# Patient Record
Sex: MALE | Race: ASIAN | Employment: OTHER | URBAN - METROPOLITAN AREA
[De-identification: names, ages, dates, MRNs, and addresses within clinical notes are randomized per-mention and may not be internally consistent; named-entity substitution may affect disease eponyms.]

---

## 2017-02-03 ENCOUNTER — HOSPITAL ENCOUNTER (EMERGENCY)
Age: 77
Discharge: HOME | End: 2017-02-03
Attending: EMERGENCY MEDICINE
Payer: MEDICARE

## 2017-02-03 VITALS
TEMPERATURE: 98 F | DIASTOLIC BLOOD PRESSURE: 70 MMHG | RESPIRATION RATE: 18 BRPM | OXYGEN SATURATION: 100 % | HEART RATE: 67 BPM | BODY MASS INDEX: 21 KG/M2 | WEIGHT: 141 LBS | SYSTOLIC BLOOD PRESSURE: 116 MMHG

## 2017-02-03 DIAGNOSIS — I86.1 VARICOSE VEIN OF SCROTUM: Primary | ICD-10-CM

## 2017-02-03 DIAGNOSIS — R58 BLEEDING: ICD-10-CM

## 2017-02-03 PROCEDURE — 12001 RPR S/N/AX/GEN/TRNK 2.5CM/<: CPT

## 2017-02-03 PROCEDURE — 99283 EMERGENCY DEPT VISIT LOW MDM: CPT

## 2017-02-03 PROCEDURE — 99282 EMERGENCY DEPT VISIT SF MDM: CPT

## 2017-02-03 RX ORDER — AMANTADINE HYDROCHLORIDE 100 MG/1
100 CAPSULE, GELATIN COATED ORAL 2 TIMES DAILY
COMMUNITY

## 2017-02-03 NOTE — ED PROVIDER NOTES
Patient Seen in: THE [facility] Emergency Department In [place]    History   Patient presents with:  Bleeding (hematologic)  Eval-G (gynecologic)    Stated Complaint: bleeding from scrotum since last noc.     HPI    The patient is a 70-year-old male with a his mouth nightly as needed. bisacodyl 10 MG Rectal Suppos,  Place 10 mg rectally daily. Pravastatin Sodium (PRAVACHOL) 20 MG Oral Tab,  Take 20 mg by mouth nightly. finasteride (PROSCAR) 5 MG Oral Tab,  Take 5 mg by mouth nightly.          Family Histo the scrotum has some dried blood. On removal of the dressing, the source of the bleeding was not clear until I was able to clean the skin.   Upon cleaning of the skin, it appears that he has a small scab on the left side of his scrotum, and it was inadvert [facility] [address]  [phone]    Go in 7 days  For suture removal    Pro Tyson MD  [facility] [address] [facility] [address] [facility] [phone]    Call in 1 day  for follow up with urology within 1-2 weeks.       Medications Prescribed:

## 2017-02-11 ENCOUNTER — HOSPITAL ENCOUNTER (EMERGENCY)
Age: 77
Discharge: HOME | End: 2017-02-11
Attending: EMERGENCY MEDICINE
Payer: MEDICARE

## 2017-02-11 VITALS
DIASTOLIC BLOOD PRESSURE: 65 MMHG | WEIGHT: 141 LBS | BODY MASS INDEX: 21 KG/M2 | RESPIRATION RATE: 20 BRPM | SYSTOLIC BLOOD PRESSURE: 130 MMHG | HEART RATE: 68 BPM | OXYGEN SATURATION: 95 % | TEMPERATURE: 97 F

## 2017-02-11 DIAGNOSIS — Z48.02 ENCOUNTER FOR REMOVAL OF SUTURES: Primary | ICD-10-CM

## 2017-02-11 NOTE — ED PROVIDER NOTES
Patient Seen in: THE [facility] Emergency Department In [place]    History   Patient presents with:  Sut Stap Maria Fernanda (ingtegumentary)    Stated Complaint: suture removal     HPI  Patient is a 66-year-old male who 8 days ago began bleeding from a varicose • Other[other] [OTHER] Father    • Courtney Rae Mother          Smoking Status: Former Smoker                   Packs/Day: 0.25  Years: 15        Quit date: 10/18/2011    Alcohol Use: Yes           0.6 oz/week       1 Glasses of wine per week

## 2017-02-26 ENCOUNTER — HOSPITAL ENCOUNTER (OUTPATIENT)
Dept: ULTRASOUND IMAGING | Age: 77
Discharge: HOME | End: 2017-02-26
Attending: FAMILY MEDICINE
Payer: MEDICARE

## 2017-02-26 DIAGNOSIS — R26.0 ATAXIC GAIT: ICD-10-CM

## 2017-02-26 DIAGNOSIS — R42 DIZZINESS AND GIDDINESS: ICD-10-CM

## 2017-02-26 DIAGNOSIS — R26.81 UNSTEADINESS ON FEET: ICD-10-CM

## 2017-02-26 PROCEDURE — 93880 EXTRACRANIAL BILAT STUDY: CPT

## 2017-10-10 ENCOUNTER — HOSPITAL ENCOUNTER (EMERGENCY)
Facility: HOSPITAL | Age: 77
Discharge: HOME | End: 2017-10-10
Attending: EMERGENCY MEDICINE
Payer: MEDICARE

## 2017-10-10 ENCOUNTER — APPOINTMENT (OUTPATIENT)
Dept: MRI IMAGING | Facility: HOSPITAL | Age: 77
End: 2017-10-10
Attending: EMERGENCY MEDICINE
Payer: MEDICARE

## 2017-10-10 VITALS
WEIGHT: 141 LBS | TEMPERATURE: 98 F | HEIGHT: 69 IN | BODY MASS INDEX: 20.88 KG/M2 | SYSTOLIC BLOOD PRESSURE: 168 MMHG | HEART RATE: 62 BPM | OXYGEN SATURATION: 100 % | DIASTOLIC BLOOD PRESSURE: 80 MMHG | RESPIRATION RATE: 18 BRPM

## 2017-10-10 DIAGNOSIS — I10 ESSENTIAL HYPERTENSION: Primary | ICD-10-CM

## 2017-10-10 DIAGNOSIS — R42 DIZZINESS: ICD-10-CM

## 2017-10-10 PROCEDURE — 85025 COMPLETE CBC W/AUTO DIFF WBC: CPT | Performed by: EMERGENCY MEDICINE

## 2017-10-10 PROCEDURE — 80053 COMPREHEN METABOLIC PANEL: CPT | Performed by: EMERGENCY MEDICINE

## 2017-10-10 PROCEDURE — 93010 ELECTROCARDIOGRAM REPORT: CPT

## 2017-10-10 PROCEDURE — 99285 EMERGENCY DEPT VISIT HI MDM: CPT

## 2017-10-10 PROCEDURE — 70551 MRI BRAIN STEM W/O DYE: CPT | Performed by: EMERGENCY MEDICINE

## 2017-10-10 PROCEDURE — 36415 COLL VENOUS BLD VENIPUNCTURE: CPT

## 2017-10-10 PROCEDURE — 93005 ELECTROCARDIOGRAM TRACING: CPT

## 2017-10-10 PROCEDURE — 84484 ASSAY OF TROPONIN QUANT: CPT | Performed by: EMERGENCY MEDICINE

## 2017-10-10 RX ORDER — AMLODIPINE BESYLATE 2.5 MG/1
2.5 TABLET ORAL DAILY
COMMUNITY
End: 2019-09-12

## 2017-10-11 NOTE — ED PROVIDER NOTES
Patient Seen in: [facility] Emergency Department    History   Patient presents with:  Hypertension (cardiovascular)    Stated Complaint: hypertension/ dizzy    HPI    Patient is a 80-year-old with a history of DVT, Parkinson's disease, BPH, hyperten Packs/day: 0.25      Years: 12.00        Quit date: 10/18/2011  Alcohol use: Yes           0.6 oz/week     Glasses of wine: 1 per week     Comment: 1-2 a month      Review of Systems    Positive for stated complaint: hypertension/ dizzy Normal   CBC WITH DIFFERENTIAL WITH PLATELET    Narrative: The following orders were created for panel order CBC WITH DIFFERENTIAL WITH PLATELET.   Procedure                               Abnormality         Status                     --------- days        Medications Prescribed:  Current Discharge Medication List

## 2017-10-30 ENCOUNTER — HOSPITAL ENCOUNTER (OUTPATIENT)
Dept: CV DIAGNOSTICS | Age: 77
Discharge: HOME | End: 2017-10-30
Attending: FAMILY MEDICINE
Payer: MEDICARE

## 2017-10-30 DIAGNOSIS — R26.81 UNSTEADINESS ON FEET: ICD-10-CM

## 2017-10-30 DIAGNOSIS — I95.9 HYPOTENSION, UNSPECIFIED HYPOTENSION TYPE: ICD-10-CM

## 2017-10-30 DIAGNOSIS — R42 DIZZINESS: ICD-10-CM

## 2017-10-30 PROCEDURE — 93306 TTE W/DOPPLER COMPLETE: CPT | Performed by: FAMILY MEDICINE

## 2018-04-23 ENCOUNTER — HOSPITAL ENCOUNTER (EMERGENCY)
Age: 78
Discharge: HOME | End: 2018-04-23
Attending: EMERGENCY MEDICINE
Payer: MEDICARE

## 2018-04-23 ENCOUNTER — APPOINTMENT (OUTPATIENT)
Dept: GENERAL RADIOLOGY | Age: 78
End: 2018-04-23
Attending: NURSE PRACTITIONER
Payer: MEDICARE

## 2018-04-23 VITALS
BODY MASS INDEX: 22 KG/M2 | OXYGEN SATURATION: 98 % | WEIGHT: 143 LBS | SYSTOLIC BLOOD PRESSURE: 112 MMHG | RESPIRATION RATE: 18 BRPM | HEART RATE: 76 BPM | TEMPERATURE: 98 F | DIASTOLIC BLOOD PRESSURE: 82 MMHG

## 2018-04-23 DIAGNOSIS — B34.9 VIRAL SYNDROME: Primary | ICD-10-CM

## 2018-04-23 PROCEDURE — 99283 EMERGENCY DEPT VISIT LOW MDM: CPT

## 2018-04-23 PROCEDURE — 87430 STREP A AG IA: CPT | Performed by: NURSE PRACTITIONER

## 2018-04-23 PROCEDURE — 87081 CULTURE SCREEN ONLY: CPT | Performed by: NURSE PRACTITIONER

## 2018-04-23 PROCEDURE — 71046 X-RAY EXAM CHEST 2 VIEWS: CPT | Performed by: NURSE PRACTITIONER

## 2018-04-23 RX ORDER — AZITHROMYCIN 500 MG/1
500 TABLET, FILM COATED ORAL DAILY
COMMUNITY
End: 2018-11-14

## 2018-04-23 NOTE — ED PROVIDER NOTES
I reviewed that chart and discussed the case with the physician assistant. I have examined the patient and noted review the x-ray. Does appear to have a viral syndrome. .    I agree with the physician assistant assessment and diagnosis  I agree with the p

## 2018-04-23 NOTE — ED PROVIDER NOTES
Patient Seen in: [facility] In [place]    History   Patient presents with:  Cough/URI  Dyspnea TESSIE SOB (respiratory)    Stated Complaint: cough and chest congestion last three days    72-year-old male presents today with complaints of Alcohol use: Yes           0.6 oz/week     Glasses of wine: 1 per week     Comment: 1-2 a month      Review of Systems    Positive for stated complaint: cough and chest congestion last three days  Other systems are as noted in HPI.   Const effusions. No pneumothorax. Linear atelectasis in the right lung base in left mid lung zone. Atheromatous calcifications of the aorta. Hyperexpansion lungs. Degenerative disc  disease of the thoracic spine.       CONCLUSION:  Hyperexpansion of the lungs

## 2018-10-05 ENCOUNTER — HOSPITAL ENCOUNTER (OUTPATIENT)
Dept: ULTRASOUND IMAGING | Age: 78
Discharge: HOME | End: 2018-10-05
Attending: FAMILY MEDICINE
Payer: MEDICARE

## 2018-10-05 DIAGNOSIS — Z86.718 HX OF DEEP VENOUS THROMBOSIS: ICD-10-CM

## 2018-10-05 DIAGNOSIS — I73.9 PVD (PERIPHERAL VASCULAR DISEASE) (HCC): ICD-10-CM

## 2018-10-05 DIAGNOSIS — M79.89 SWELLING OF CALF: ICD-10-CM

## 2018-10-05 PROCEDURE — 93971 EXTREMITY STUDY: CPT | Performed by: FAMILY MEDICINE

## 2018-10-05 PROCEDURE — 93880 EXTRACRANIAL BILAT STUDY: CPT | Performed by: FAMILY MEDICINE

## 2018-10-31 ENCOUNTER — HOSPITAL ENCOUNTER (OUTPATIENT)
Dept: MRI IMAGING | Facility: HOSPITAL | Age: 78
Discharge: HOME | End: 2018-10-31
Attending: FAMILY MEDICINE
Payer: MEDICARE

## 2018-10-31 DIAGNOSIS — R97.20 ELEVATED PSA: ICD-10-CM

## 2018-10-31 PROCEDURE — 72197 MRI PELVIS W/O & W/DYE: CPT | Performed by: FAMILY MEDICINE

## 2018-10-31 PROCEDURE — 82565 ASSAY OF CREATININE: CPT

## 2018-10-31 PROCEDURE — A9575 INJ GADOTERATE MEGLUMI 0.1ML: HCPCS | Performed by: FAMILY MEDICINE

## 2018-11-14 ENCOUNTER — HOSPITAL ENCOUNTER (EMERGENCY)
Facility: HOSPITAL | Age: 78
Discharge: HOME | End: 2018-11-14
Attending: EMERGENCY MEDICINE
Payer: MEDICARE

## 2018-11-14 VITALS
WEIGHT: 148 LBS | SYSTOLIC BLOOD PRESSURE: 121 MMHG | RESPIRATION RATE: 20 BRPM | HEIGHT: 69 IN | DIASTOLIC BLOOD PRESSURE: 59 MMHG | BODY MASS INDEX: 21.92 KG/M2 | TEMPERATURE: 98 F | OXYGEN SATURATION: 98 % | HEART RATE: 72 BPM

## 2018-11-14 DIAGNOSIS — N30.00 ACUTE CYSTITIS WITHOUT HEMATURIA: Primary | ICD-10-CM

## 2018-11-14 PROCEDURE — 81001 URINALYSIS AUTO W/SCOPE: CPT | Performed by: EMERGENCY MEDICINE

## 2018-11-14 PROCEDURE — 87186 SC STD MICRODIL/AGAR DIL: CPT | Performed by: EMERGENCY MEDICINE

## 2018-11-14 PROCEDURE — 99284 EMERGENCY DEPT VISIT MOD MDM: CPT | Performed by: EMERGENCY MEDICINE

## 2018-11-14 PROCEDURE — 83605 ASSAY OF LACTIC ACID: CPT | Performed by: EMERGENCY MEDICINE

## 2018-11-14 PROCEDURE — 80053 COMPREHEN METABOLIC PANEL: CPT | Performed by: EMERGENCY MEDICINE

## 2018-11-14 PROCEDURE — 87086 URINE CULTURE/COLONY COUNT: CPT | Performed by: EMERGENCY MEDICINE

## 2018-11-14 PROCEDURE — 96365 THER/PROPH/DIAG IV INF INIT: CPT | Performed by: EMERGENCY MEDICINE

## 2018-11-14 PROCEDURE — 85025 COMPLETE CBC W/AUTO DIFF WBC: CPT | Performed by: EMERGENCY MEDICINE

## 2018-11-14 PROCEDURE — 87077 CULTURE AEROBIC IDENTIFY: CPT | Performed by: EMERGENCY MEDICINE

## 2018-11-14 RX ORDER — TAMSULOSIN HYDROCHLORIDE 0.4 MG/1
0.4 CAPSULE ORAL DAILY
COMMUNITY
End: 2019-09-12

## 2018-11-14 RX ORDER — LEVOFLOXACIN 5 MG/ML
750 INJECTION, SOLUTION INTRAVENOUS ONCE
Status: COMPLETED | OUTPATIENT
Start: 2018-11-14 | End: 2018-11-14

## 2018-11-14 RX ORDER — SULFAMETHOXAZOLE AND TRIMETHOPRIM 800; 160 MG/1; MG/1
1 TABLET ORAL 2 TIMES DAILY
Qty: 14 TABLET | Refills: 0 | Status: ON HOLD | OUTPATIENT
Start: 2018-11-14 | End: 2018-11-21

## 2018-11-15 NOTE — ED INITIAL ASSESSMENT (HPI)
Here for generalized weakness, urinary frequency with fever today.  Recently have lockwood cath x 2 weeks taken out yesterday, FC was placed due to prostate level studies

## 2018-11-15 NOTE — CONSULTS
[facility] Pharmacy Note:  Renal Adjustment for levofloxacin (LEVAQUIN)    Grayson Conception is a 66year old male who has been prescribed levofloxacin (LEVAQUIN) 500 mg x1 dose. CrCl is estimated creatinine clearance is 64.2 mL/min (based on SCr of 0.9 mg/dL).  so

## 2018-11-16 ENCOUNTER — HOSPITAL ENCOUNTER (INPATIENT)
Facility: HOSPITAL | Age: 78
LOS: 4 days | Discharge: HOME HEALTH | DRG: 698 | End: 2018-11-21
Attending: EMERGENCY MEDICINE | Admitting: HOSPITALIST
Payer: MEDICARE

## 2018-11-16 DIAGNOSIS — N39.0 URINARY TRACT INFECTION WITHOUT HEMATURIA, SITE UNSPECIFIED: Primary | ICD-10-CM

## 2018-11-17 ENCOUNTER — APPOINTMENT (OUTPATIENT)
Dept: ULTRASOUND IMAGING | Facility: HOSPITAL | Age: 78
DRG: 698 | End: 2018-11-17
Attending: INTERNAL MEDICINE
Payer: MEDICARE

## 2018-11-17 PROBLEM — N39.0 URINARY TRACT INFECTION WITHOUT HEMATURIA: Status: ACTIVE | Noted: 2018-11-17

## 2018-11-17 PROBLEM — N39.0 URINARY TRACT INFECTION WITHOUT HEMATURIA, SITE UNSPECIFIED: Status: ACTIVE | Noted: 2018-11-17

## 2018-11-17 PROCEDURE — 93975 VASCULAR STUDY: CPT | Performed by: INTERNAL MEDICINE

## 2018-11-17 PROCEDURE — 99222 1ST HOSP IP/OBS MODERATE 55: CPT | Performed by: HOSPITALIST

## 2018-11-17 PROCEDURE — 76870 US EXAM SCROTUM: CPT | Performed by: INTERNAL MEDICINE

## 2018-11-17 RX ORDER — ACETAMINOPHEN 325 MG/1
650 TABLET ORAL EVERY 4 HOURS PRN
Status: DISCONTINUED | OUTPATIENT
Start: 2018-11-17 | End: 2018-11-21

## 2018-11-17 RX ORDER — ALPRAZOLAM 0.25 MG/1
0.25 TABLET ORAL NIGHTLY PRN
Status: DISCONTINUED | OUTPATIENT
Start: 2018-11-17 | End: 2018-11-21

## 2018-11-17 RX ORDER — AMANTADINE HYDROCHLORIDE 100 MG/1
100 TABLET ORAL 2 TIMES DAILY
Status: DISCONTINUED | OUTPATIENT
Start: 2018-11-17 | End: 2018-11-21

## 2018-11-17 RX ORDER — IBUPROFEN 400 MG/1
400 TABLET ORAL EVERY 6 HOURS PRN
Status: DISCONTINUED | OUTPATIENT
Start: 2018-11-17 | End: 2018-11-21

## 2018-11-17 RX ORDER — FINASTERIDE 5 MG/1
5 TABLET, FILM COATED ORAL NIGHTLY
Status: DISCONTINUED | OUTPATIENT
Start: 2018-11-17 | End: 2018-11-21

## 2018-11-17 RX ORDER — LEVOFLOXACIN 5 MG/ML
750 INJECTION, SOLUTION INTRAVENOUS ONCE
Status: DISCONTINUED | OUTPATIENT
Start: 2018-11-17 | End: 2018-11-17

## 2018-11-17 RX ORDER — SODIUM CHLORIDE 9 MG/ML
INJECTION, SOLUTION INTRAVENOUS CONTINUOUS
Status: ACTIVE | OUTPATIENT
Start: 2018-11-17 | End: 2018-11-17

## 2018-11-17 RX ORDER — ACETAMINOPHEN 325 MG/1
650 TABLET ORAL EVERY 6 HOURS PRN
Status: DISCONTINUED | OUTPATIENT
Start: 2018-11-17 | End: 2018-11-17

## 2018-11-17 RX ORDER — ONDANSETRON 2 MG/ML
4 INJECTION INTRAMUSCULAR; INTRAVENOUS EVERY 6 HOURS PRN
Status: DISCONTINUED | OUTPATIENT
Start: 2018-11-17 | End: 2018-11-21

## 2018-11-17 RX ORDER — ALFUZOSIN HYDROCHLORIDE 10 MG/1
10 TABLET, EXTENDED RELEASE ORAL
Status: DISCONTINUED | OUTPATIENT
Start: 2018-11-17 | End: 2018-11-21

## 2018-11-17 RX ORDER — RASAGILINE 0.5 MG/1
1 TABLET ORAL DAILY
Status: DISCONTINUED | OUTPATIENT
Start: 2018-11-17 | End: 2018-11-21

## 2018-11-17 RX ORDER — ALPRAZOLAM 0.25 MG/1
0.25 TABLET ORAL DAILY
Status: DISCONTINUED | OUTPATIENT
Start: 2018-11-17 | End: 2018-11-17

## 2018-11-17 RX ORDER — METOCLOPRAMIDE HYDROCHLORIDE 5 MG/ML
10 INJECTION INTRAMUSCULAR; INTRAVENOUS EVERY 8 HOURS PRN
Status: DISCONTINUED | OUTPATIENT
Start: 2018-11-17 | End: 2018-11-21

## 2018-11-17 RX ORDER — MELATONIN
3 NIGHTLY PRN
Status: DISCONTINUED | OUTPATIENT
Start: 2018-11-17 | End: 2018-11-21

## 2018-11-17 RX ORDER — ASPIRIN 81 MG/1
81 TABLET, CHEWABLE ORAL
Status: DISCONTINUED | OUTPATIENT
Start: 2018-11-17 | End: 2018-11-21

## 2018-11-17 RX ORDER — AZELASTINE 1 MG/ML
1-2 SPRAY, METERED NASAL 2 TIMES DAILY
Status: DISCONTINUED | OUTPATIENT
Start: 2018-11-17 | End: 2018-11-21

## 2018-11-17 RX ORDER — ENOXAPARIN SODIUM 100 MG/ML
40 INJECTION SUBCUTANEOUS DAILY
Status: DISCONTINUED | OUTPATIENT
Start: 2018-11-17 | End: 2018-11-21

## 2018-11-17 NOTE — PLAN OF CARE
Patient is alert and oriented x 4. Vitals stable . C/o some aching pain on the testes. Notified Abhilash Barnett about the pain at bedside. Has some low grade fever. Plan of care updated to the pt. Fall precautions observed. Will continue to monitor .     1500: P

## 2018-11-17 NOTE — ED NOTES
Report given to [place], RN x [phone] at [phone]. Transport paged. Pt and family updated regarding plan of care.

## 2018-11-17 NOTE — PLAN OF CARE
NURSING ADMISSION NOTE      Patient admitted via Cart  Oriented to room. Safety precautions initiated. Bed in low position. Call light in reach. Patient A+Ox4. Wife at bedside. Able to ambulate to bed with assistance.  Admission database completed

## 2018-11-17 NOTE — ED PROVIDER NOTES
Patient Seen in: [facility] Emergency Department    History   Patient presents with:  Urinary Symptoms (urologic)    Stated Complaint: uti symptoms    HPI    Patient is a 51-year-old male recently diagnosed with UTI 3 days ago, who was given a dose Yes      Alcohol/week: 0.6 oz      Types: 1 Glasses of wine per week      Comment: 1-2 a month    Drug use: No      Review of Systems    Positive for stated complaint: uti symptoms  Other systems are as noted in HPI.   Constitutional and vital signs reviewe throughout, and normal active range of motion of all 4 extremities. Distal pulses normal and symmetric  Skin: No lacerations or abrasions. No masses or nodules or abnormalities.   Psych: Normal interaction, cooperative with exam       ED Course     Labs R infected. Culture is pending. He is given with IV levofloxacin and IV normal saline. He will need to be admitted for IV antibiotics for urinary tract infection, with failure of outpatient management.       [facility]     Admission disposition: 11/17/2018  1:23

## 2018-11-17 NOTE — ED INITIAL ASSESSMENT (HPI)
Pt to ED c/o frequency in urination with fever & dysuria. Pt is currently on antibiotics for UTI after seen here in the ED on 11/14/18. Pt's wife also states that Pt fell tonight at 200.  Pt was using the washroom, was trying to get up but legs felt weak,

## 2018-11-17 NOTE — PROGRESS NOTES
Hospitalist Follow-up Note    Patient seen and examined and agree with plan outlined by Dr. Ele Carmona earlier this AM. Continue empiric antibiotics for ESBL UTI. Repeat urine culture pending. ID eval pending. Continue IVF.     Veronika Warren,   11:54 AM

## 2018-11-17 NOTE — CONSULTS
[address] [place] [address]  TEL: [phone]  FAX: [phone]    Jim Posey Patient Status:  Inpatient    1940 MRN [id number]   [facility] 4NW-A Attending Wilner Millan, [address] [facility] Day # 0 CARMELA Cloud MD 10/24/2016    Performed by Bernardino Hobbs MD at [address] [place] [address] MAIN OR     Family History   Problem Relation Age of Onset   • Heart Attack Father    • Other (Other) Father    • Other (Other) Mother    • Diabetes Sister    • Hypertension Sister    • Diabetes Brother systems was completed. Pertinent positives and negatives noted in the the HPI. Physical Exam:    General: No acute distress. Alert and oriented x 3.   Vital signs: Blood pressure 136/58, pulse 101, temperature 99 °F (37.2 °C), temperature source Oral, r with scrotal wall cellulitis and edema right side due to above. Has increased pain discomfort and swelling  4. Electrolyte imbalance associated with above  5. History of Parkinson's with continued worsening weakness due to sepsis. Oneal Claude     PLAN:    -Continue

## 2018-11-18 PROCEDURE — 99232 SBSQ HOSP IP/OBS MODERATE 35: CPT | Performed by: HOSPITALIST

## 2018-11-18 NOTE — PROGRESS NOTES
IVA HOSPITALIST  Progress Note     Rufus Aly Patient Status:  Inpatient    1940 MRN [id number]   [facility] 4NW-A Attending Thom Callahan MD   Hosp Day # 1 PCP Faiza Matos MD     Chief Complaint: fever    S: Patient denies Daily   • meropenem  500 mg Intravenous Q8H   • Amantadine HCl  100 mg Oral BID   • aspirin  81 mg Oral Q48H   • Azelastine HCl  1-2 spray Nasal BID   • finasteride  5 mg Oral Nightly   • metoprolol Tartrate  12.5 mg Oral 2x Daily(Beta Blocker)   • Rasagil

## 2018-11-18 NOTE — PHYSICAL THERAPY NOTE
PHYSICAL THERAPY QUICK EVALUATION - INPATIENT    Room Number: 426/426-A  Evaluation Date: 11/18/2018  Presenting Problem: UTI  Physician Order: PT Eval and Treat    Pt was admitted from home on 11/16/2018 with UTI.  Pt has a past medical history significa extremity ROM and strength are within functional limits     Lower extremity ROM is within functional limits      Lower extremity strength is within functional limits     NEUROLOGICAL FINDINGS                      ACTIVITY TOLERANCE increase ease with. Educated on having family assist upon performing first time after dc to home. Patient End of Session: Needs met;Call light within reach;RN aware of session/findings; All patient questions and concerns addressed; With [facility] staff(seated e

## 2018-11-18 NOTE — CONSULTS
[facility]  Report of Consultation    Tonya Cruz Patient Status:  Inpatient    1940 MRN [id number]   [facility] 4NW-A Attending Anjel Yusuf MD   Hosp Day # 1 PCP Dank Almazan MD     Reason for Consultation:  christel Desai Problem Relation Age of Onset   • Heart Attack Father    • Other (Other) Father    • Other (Other) Mother    • Diabetes Sister    • Hypertension Sister    • Diabetes Brother    • Hypertension Brother       reports that he quit smoking about 7 years ago. masses/HSM, no tenderness  CVA: no CVA tenderness  INGUINAL CANALS: no hernias  PENILE MEATUS: open and in normal location  SCROTUM: normal  no varicocele  TESTES: right testicular swelling, right testicular tenderness  EPIDIDYMIS: right swelling, right te

## 2018-11-18 NOTE — PROGRESS NOTES
Pt A&O times 4. Denies any pain. IV merrem continued for UTI. Elevated temp this morning. 102.1, tylenol given.  Up with standby assist.       8229 Bladder scan done this AM, showed greater than 600.  0645 straight cath done 700cc out

## 2018-11-18 NOTE — PROGRESS NOTES
[address] [place] [address]  TEL: [phone]  FAX: [phone]    Trina Pierson Patient Status:  Inpatient    1940 MRN [id number]   [facility] 4NW-A Attending Malina Brown, [address] [facility] Day # 1 PCP Maureen [facility]ros [facility]    • Pneumonia, organism unspecified(486)    • Prostate asymmetry    • Sleep disturbance    • Visual impairment     reading glasses   • Wears glasses     reading glasses      Past Surgical History:   Procedure Laterality Date   • HERNIA INGUINAL REPA tab 400 mg, 400 mg, Oral, Q6H PRN  •  acetaminophen (TYLENOL) tab 650 mg, 650 mg, Oral, Q4H PRN  •  ALPRAZolam (XANAX) tab 0.25 mg, 0.25 mg, Oral, Nightly PRN    Review of Systems:    A comprehensive 10 point review of systems was completed.   Pertinent pos and edema right side due to above. Has increased pain discomfort and swelling  4. Electrolyte imbalance associated with above  5. History of Parkinson's with continued worsening weakness due to sepsis. Rosalva Milian PLAN:    -Continue with IV meropenem.   Will nee

## 2018-11-19 ENCOUNTER — APPOINTMENT (OUTPATIENT)
Dept: GENERAL RADIOLOGY | Facility: HOSPITAL | Age: 78
DRG: 698 | End: 2018-11-19
Attending: HOSPITALIST
Payer: MEDICARE

## 2018-11-19 PROCEDURE — 99232 SBSQ HOSP IP/OBS MODERATE 35: CPT | Performed by: HOSPITALIST

## 2018-11-19 PROCEDURE — 71045 X-RAY EXAM CHEST 1 VIEW: CPT | Performed by: HOSPITALIST

## 2018-11-19 RX ORDER — BISACODYL 10 MG
10 SUPPOSITORY, RECTAL RECTAL
Status: DISCONTINUED | OUTPATIENT
Start: 2018-11-19 | End: 2018-11-21

## 2018-11-19 NOTE — PROGRESS NOTES
IVA HOSPITALIST  Progress Note     Royal Lee Patient Status:  Inpatient    1940 MRN [id number]   [facility] 4NW-A Attending Eder Cheng MD   Hosp Day # 2 PCP Nanci Morris MD     Chief Complaint: fever    S: complaining of results for input(s): TROP, CK in the last 168 hours. Imaging: Imaging data reviewed in Epic.     Medications:   • enoxaparin  40 mg Subcutaneous Daily   • meropenem  500 mg Intravenous Q8H   • Amantadine HCl  100 mg Oral BID   • aspirin  81 mg Oral

## 2018-11-19 NOTE — PAYOR COMM NOTE
--------------  ADMISSION REVIEW       11/17    ED      Patient Seen in: [facility] Emergency Department     History   Patient presents with:  Urinary Symptoms      Stated Complaint: uti symptoms     HPI     Patient is a 66-year-old male recently diag throughout abdomen. No CVA tenderness. Back: No midline step-offs. No midline tenderness. Pelvis: No instability or tenderness with palpation or percussion. Normal active range of motion of the hips.    Extremities:  No deformity, nontender throughout, a RAINBOW DRAW BLUE   RAINBOW DRAW LAVENDER   RAINBOW DRAW LIGHT GREEN   RAINBOW DRAW GOLD   BLOOD CULTURE   BLOOD CULTURE PENDING       ED COURSE  Blood was obtained and peripheral IV access was established.   Urine was collected for urinalysis and culture

## 2018-11-19 NOTE — PROGRESS NOTES
[address] [facility]  TEL: [phone]  FAX: [phone]    Norah Woodall Patient Status:  Inpatient    1940 MRN [id number]   [facility] 4NW-A Attending Jr Abbott MD   [facility] Day # 2 PCP Juan Jose Lechuga 0.6   --    TP  7.1  6.8  6.3*   --        Microbiology    Reviewed in EMR,  Bcx neg    Radiology: Us Kidneys (OLO=22099)    Result Date: 11/4/2018  DATE OF SERVICE: 11.04.2018 RETROPERITONEAL ULTRASOUND, ULTRASOUND KIDNEYS CLINICAL INFORMATION:  Poor urin Increased vascularity involving the right epididymis as well as right testes suggestive of right epididymitis/orchitis. 2. 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 3. There is a right-sided varicocele.   When unilateral varicoceles are seen only on t ZONE: Linear susceptibility artifact is evident in the prostatic urethra, corresponding to a Cuellar catheter. OTHER PELVIC FINDINGS: SEMINAL VESICLES: The right seminal vesicle appears homogeneously T2 hyperintense signal with unremarkable morphology.  The 4: High (clinically significant cancer is likely to be present) PI-RADS 5: Very high (clinically significant cancer is likely to be present)     Dictated by (CST): Surekha Alcaraz MD on 11/07/2018 at 15:23     Approved by (CST): Surekha Alcaraz MD on 11/07

## 2018-11-19 NOTE — PLAN OF CARE
Patient and wife very anxious about possible placement of lockwood catheter. Post void bladder scan was 45cc at 9pm. This am post void scan was 200cc. Patient has voided 750 so far tonight, denies pressure in abdomen.  Patient and wife want to  wait until am t

## 2018-11-19 NOTE — PLAN OF CARE
GENITOURINARY - ADULT    • Absence of urinary retention Progressing        HEMATOLOGIC - ADULT    • Free from bleeding injury Progressing        Impaired Functional Mobility    • Achieve highest/safest level of mobility/gait Progressing        METABOLIC/FL

## 2018-11-19 NOTE — PROGRESS NOTES
[facility]    Progress Note    Rosadiq Raglanddank Patient Status:  Inpatient    1940 MRN [id number]   [facility] 4NW-A Attending Alphonso Hamilton MD   Hosp Day # 2 PCP Reese Malcolm MD         Subjective:    Ro Daniels is a(n) 66 y 11/17/2018    ALKPHO 53 11/17/2018    BILT 0.6 11/17/2018    TP 6.3 (L) 11/17/2018    AST 17 11/17/2018    ALT 13 (L) 11/17/2018    T4F 0.9 04/10/2016    TSH 6.560 (H) 04/10/2016    DDIMER 0.27 04/10/2016    TROP <0.046 10/10/2017    CK 99 04/10/2016

## 2018-11-20 PROCEDURE — 99232 SBSQ HOSP IP/OBS MODERATE 35: CPT | Performed by: HOSPITALIST

## 2018-11-20 PROCEDURE — 05HY33Z INSERTION OF INFUSION DEVICE INTO UPPER VEIN, PERCUTANEOUS APPROACH: ICD-10-PCS | Performed by: HOSPITALIST

## 2018-11-20 RX ORDER — SODIUM CHLORIDE 0.9 % (FLUSH) 0.9 %
10 SYRINGE (ML) INJECTION EVERY 12 HOURS
Status: DISCONTINUED | OUTPATIENT
Start: 2018-11-20 | End: 2018-11-21

## 2018-11-20 NOTE — PLAN OF CARE
Absence of urinary retention Not Progressing      Electrolytes maintained within normal limits Not Progressing      Free from bleeding injury Progressing      Achieve highest/safest level of mobility/gait Progressing      Absence of fever/infection during

## 2018-11-20 NOTE — PROGRESS NOTES
IVA HOSPITALIST  Progress Note     Lucia Downs Patient Status:  Inpatient    1940 MRN [id number]   [facility] 4NW-A Attending Anupama Luu MD   Hosp Day # 3 PCP Davion Clark MD     Chief Complaint: fever    S: thinks his par hours. No results for input(s): TROP, CK in the last 168 hours. Imaging: Imaging data reviewed in Epic.     Medications:   • enoxaparin  40 mg Subcutaneous Daily   • meropenem  500 mg Intravenous Q8H   • Amantadine HCl  100 mg Oral BID   • aspiri

## 2018-11-20 NOTE — SLP NOTE
Orders received for SLP evaluation. Pt JUAN M for procedure. SLP to re-attempt tomorrow 11/21/18. Pt on a diet. RN aware.    Yajaira Gamboa M.S., JEVON-SLP/L

## 2018-11-20 NOTE — PLAN OF CARE
Temp 99.8 this am. Lockwood draining clear urine. Iv antibiotics continued. lockwood care done, slight bleeding around lockwood insertion,. Concerned about sitting up and getting to standing position , feels his mobility is declining.  Encouraged to walk frequentl

## 2018-11-20 NOTE — PAYOR COMM NOTE
--------------  CONTINUED STAY REVIEW    Payor: Manuel Pearson  Subscriber #:  [id number]  Authorization Number: [id number]    Admit date: 11/17/18  Admit time: Sofia    Admitting Physician: Will Curry MD  Attending Physician:  Lavonne Toscano MD  P (MERREM) 500 mg in sodium chloride 0.9% 100 mL MBP     Date Action Dose Route User    11/20/2018 0800 New Bag 500 mg Intravenous Sailaja Hummel, [facility]    11/20/2018 0140 New Bag 500 mg Intravenous Haresh Montes, ABHI    11/19/2018 1755 New Bag 500 mg Intr

## 2018-11-20 NOTE — PROGRESS NOTES
[facility]  Urology Progress Note    Nicky Adeline Patient Status:  Inpatient    1940 MRN [id number]   [facility] 4NW-A Attending Jacinto Gonzalez MD   Hosp Day # 3 PCP Sue Harmon MD     Subjective:   Nicky Lr is a(n) 66

## 2018-11-21 VITALS
HEIGHT: 69 IN | DIASTOLIC BLOOD PRESSURE: 72 MMHG | RESPIRATION RATE: 18 BRPM | SYSTOLIC BLOOD PRESSURE: 131 MMHG | BODY MASS INDEX: 21.92 KG/M2 | HEART RATE: 73 BPM | TEMPERATURE: 99 F | OXYGEN SATURATION: 100 % | WEIGHT: 148 LBS

## 2018-11-21 PROCEDURE — 99232 SBSQ HOSP IP/OBS MODERATE 35: CPT | Performed by: HOSPITALIST

## 2018-11-21 NOTE — PROGRESS NOTES
Patient seen and examined    S- no complaints    General- NAD  Chest- CTAB  CVS- RRR  Abdo- soft,NT, BS+    Plan  Ok to dc home once all arrangements made for IV antibiotics    Andrew Buckley M.D.   [facility]

## 2018-11-21 NOTE — PLAN OF CARE
Problem: Impaired Swallowing  Goal: Minimize aspiration risk  Interventions:  - Regular consistency (pt to choose soft, wet food) Thin liquids  - Extra sauce gravy  - Patient should be alert and upright for all feedings (90 degrees preferred)  - Offer food

## 2018-11-21 NOTE — PROGRESS NOTES
NURSING DISCHARGE NOTE    Discharged Home via Wheelchair. Accompanied by Spouse  Belongings Taken by patient/family.   Pt dc'd aaox4, denies pain, midline on TRACEE flushed w/ NS, dressing d/i, lockwood cath drains clear yellow urine, dc instructions given t

## 2018-11-21 NOTE — PLAN OF CARE
GENITOURINARY - ADULT    • Absence of urinary retention Progressing        HEMATOLOGIC - ADULT    • Free from bleeding injury Progressing        Impaired Functional Mobility    • Achieve highest/safest level of mobility/gait Progressing        RISK FOR INF

## 2018-11-21 NOTE — SLP NOTE
ADULT SWALLOWING EVALUATION    ASSESSMENT    ASSESSMENT/OVERALL IMPRESSION:  Order received for bedside swallow evaluation. Pt is a 66year old with hx of Parkinson's and GERD admitted with UTI. Per MD note pt c/o odynophagia.     Pt found reclined in bed; sauce/gravy; Slow rate; Alternate consistencies;Small bites and sips  Aspiration Precautions: Upright position; Slow rate;Small bites and sips  Medication Administration Recommendations: One pill at a time(with thin liquid; whole in puree if difficulty )  Grey Clear  Respiratory Status: Unlabored  Consistencies Trialed: Thin liquids;Puree;Hard solid  Method of Presentation: Self presentation;Spoon;Cup;Straw  Patient Positioning: Upright    Oral Phase of Swallow:  Within Functional Limits                      Phar

## 2018-11-21 NOTE — OCCUPATIONAL THERAPY NOTE
OCCUPATIONAL THERAPY EVALUATION - INPATIENT     Room Number: 426/426-A  Evaluation Date: 11/21/2018  Type of Evaluation: Initial  Presenting Problem: UTI     Physician Order: IP Consult to Occupational Therapy  Reason for Therapy: ADL/IADL Dysfunction and doing Anselmo Chi every morning. SUBJECTIVE   \"I don't know how I lost all this strength. \"     Patient self-stated goal is to go home      OBJECTIVE  Precautions: None  Fall Risk: Standard fall risk    WEIGHT BEARING RESTRICTION  Weight Bearing Restric Pt performed functional mobility with supervision to chair/ Pt was educated on safety precautions. Pt was left in his chair with questions answered, needs met and call light within reach. Pt's RN/PCT was made aware of pt's present position and condition. education;Patient/Family training;Equipment eval/education; Compensatory technique education;Continued evaluation  Rehab Potential : Good  Frequency (Obs): 3x/week  Number of Visits to Meet Established Goals: 3    ADL Goals   Patient will perform lower body

## 2018-11-21 NOTE — HOME CARE LIAISON
Referral from [facility]. Met with patient to offer home health services when d/c home for IVAB. Referral sent to [facility] for daily Invanz for 2 weeks. Meena from [facility] to do a \"teach/train\" today since patient will be d/c later today.   Spouse/patient very

## 2018-11-21 NOTE — CM/SW NOTE
Referral sent to [facility] for Home IV ABT. Awaiting if they are able to accept patient.     Jameson Boswell, 11/21/18, 9:46 AM

## 2018-11-21 NOTE — PROGRESS NOTES
[address] [facility]  TEL: [phone]  FAX: [phone]    [facility] Patient Status:  Inpatient    1940 MRN [id number]   [facility] 4NW-A Attending Pat Salmeron MD   [facility] Day # 3 PCP Marilyn Grewal 6.8  6.3*   --        Microbiology    Reviewed in EMR,  Bcx neg    Radiology: Us Kidneys (BHP=06000)    Result Date: 11/4/2018  DATE OF SERVICE: 11.04.2018 RETROPERITONEAL ULTRASOUND, ULTRASOUND KIDNEYS CLINICAL INFORMATION:  Poor urinary stream COMPARISON involving the right epididymis as well as right testes suggestive of right epididymitis/orchitis. 2. 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 3. There is a right-sided varicocele.   When unilateral varicoceles are seen only on the right further evalu artifact is evident in the prostatic urethra, corresponding to a Cuellar catheter. OTHER PELVIC FINDINGS: SEMINAL VESICLES: The right seminal vesicle appears homogeneously T2 hyperintense signal with unremarkable morphology.  The left seminal vesicle is part significant cancer is likely to be present) PI-RADS 5: Very high (clinically significant cancer is likely to be present)     Dictated by (CST): Carolina Kelley MD on 11/07/2018 at 15:23     Approved by (CST): Carolina Kelley MD on 11/07/2018 at 15:50

## 2018-11-21 NOTE — CM/SW NOTE
11/21/18 1100   CM/SW Referral Data   Referral Source Social Work (self-referral)   Reason for Referral Discharge planning   Informant Spouse; Children   Patient Info   Patient's Mental Status Alert;Oriented   Patient's Home Environment House   Patient l

## 2018-11-21 NOTE — PROGRESS NOTES
[address] [facility]  TEL: [phone]  FAX: [phone]    Terrie Rajan Patient Status:  Inpatient    1940 MRN [id number]   [facility] 4NW-A Attending Rosalind Rose MD   [facility] Day # 4 PCP Rosmery Pereyra 6.3*   --        Microbiology    Reviewed in EMR,  Bcx neg    Radiology: reviewed     A/P    1) ESBL E coli UTI/ epididymitis and possible prostatitis with sepsis  - no response to bactrim, no other po options.  Will need to go home on a IV carbapenem (Inva

## 2018-11-21 NOTE — PHYSICAL THERAPY NOTE
Patient presented sitting upright EOB; VSS and agreeable to participate. Performed BLE TherEx in order to improve sit>stand transitions. Transferred sit>stand w/supervision assist.  Ambulated 300' w/ stand-by assist sans AD.   Upon completion, patient lef

## 2018-11-21 NOTE — CM/SW NOTE
11/21/18 1500   Discharge disposition   Expected discharge disposition Home or Self   Name of Facillity/Home Care/Hospice Residential   Discharge transportation Private car   [facility] to deliver the meds at 8pm to [address] [place] [address]. [facility] to arrive between 12pm and 1pm.

## 2018-11-21 NOTE — CM/SW NOTE
Met w/pt, pt's wife, and son. Pt is agreeable to 200$ co pay for each week. Pt and wife seemed very nervous about doing the IV ABX's at home. SW explained it in great detail.  SAYDA also requested Mary Light from [facility] to come to the pt's room and provide them w/a

## 2018-11-21 NOTE — PROGRESS NOTES
[facility]  Urology Progress Note    Ro Jeremiah Patient Status:  Inpatient    1940 MRN [id number]   [facility] 4[facility] Attending Alphonso Hamilton MD   [facility] Day # 4 PCP Reese Malcolm MD     Subjective:   Ro Daniels is a(n) 66

## 2018-11-23 NOTE — DISCHARGE SUMMARY
[facility] HOSPITALIST  DISCHARGE SUMMARY     Isabel Box Patient Status:  Inpatient    1940 MRN [id number]   [facility] 4NW-A Attending No att. providers found   Hosp Day # 4 PCP Denisse Barksdale MD     Date of Admission: 2018  Matt He was also evaluated by urology during his hospitalization. Patient was discharged home on IV antibiotics. He was to follow-up with urology as an outpatient for close follow-up. He was discharged with a Cuellar catheter as well.     Lace+ Score: 52  59-90 0     metoprolol Tartrate 25 MG Tabs  Commonly known as:  LOPRESSOR      Take 1/2 tab 12.5 mg one tab twice daily   Quantity:  45 tablet  Refills:  3     Rasagiline Mesylate 1 MG Tabs      Take 1 mg by mouth daily.    Refills:  0     tamsulosin HCl 0.4 MG C

## 2018-11-23 NOTE — PAYOR COMM NOTE
--------------  DISCHARGE REVIEW    Payor: Jose Angel Gaitan  Subscriber #:  [id number]  Authorization Number: [id number]    Admit date: 11/17/18  Admit time:  0226  Discharge Date: 11/21/2018  6:15 PM     Admitting Physician: Wesley Shaw MD  Attending

## 2018-11-26 ENCOUNTER — LAB REQUISITION (OUTPATIENT)
Dept: LAB | Facility: HOSPITAL | Age: 78
End: 2018-11-26
Payer: MEDICARE

## 2018-11-26 DIAGNOSIS — N39.0 URINARY TRACT INFECTION: ICD-10-CM

## 2018-11-26 PROCEDURE — 85025 COMPLETE CBC W/AUTO DIFF WBC: CPT | Performed by: INTERNAL MEDICINE

## 2018-11-26 PROCEDURE — 80048 BASIC METABOLIC PNL TOTAL CA: CPT | Performed by: INTERNAL MEDICINE

## 2018-12-03 ENCOUNTER — LAB REQUISITION (OUTPATIENT)
Dept: LAB | Facility: HOSPITAL | Age: 78
End: 2018-12-03
Payer: MEDICARE

## 2018-12-03 DIAGNOSIS — B96.20 UNSPECIFIED ESCHERICHIA COLI (E. COLI) AS THE CAUSE OF DISEASES CLASSIFIED ELSEWHERE: ICD-10-CM

## 2018-12-03 DIAGNOSIS — N39.0 URINARY TRACT INFECTION: ICD-10-CM

## 2018-12-03 PROCEDURE — 85025 COMPLETE CBC W/AUTO DIFF WBC: CPT | Performed by: INTERNAL MEDICINE

## 2018-12-03 PROCEDURE — 80048 BASIC METABOLIC PNL TOTAL CA: CPT | Performed by: INTERNAL MEDICINE

## 2018-12-19 ENCOUNTER — HOSPITAL ENCOUNTER (EMERGENCY)
Facility: HOSPITAL | Age: 78
Discharge: HOME | End: 2018-12-19
Payer: MEDICARE

## 2018-12-19 VITALS
RESPIRATION RATE: 17 BRPM | TEMPERATURE: 98 F | HEART RATE: 89 BPM | WEIGHT: 147.94 LBS | HEIGHT: 66 IN | SYSTOLIC BLOOD PRESSURE: 118 MMHG | DIASTOLIC BLOOD PRESSURE: 55 MMHG | BODY MASS INDEX: 23.77 KG/M2 | OXYGEN SATURATION: 97 %

## 2018-12-19 DIAGNOSIS — T83.9XXA FOLEY CATHETER PROBLEM, INITIAL ENCOUNTER (HCC): Primary | ICD-10-CM

## 2018-12-19 PROCEDURE — 99281 EMR DPT VST MAYX REQ PHY/QHP: CPT

## 2018-12-20 ENCOUNTER — HOSPITAL ENCOUNTER (EMERGENCY)
Age: 78
Discharge: HOME | End: 2018-12-20
Attending: EMERGENCY MEDICINE
Payer: MEDICARE

## 2018-12-20 ENCOUNTER — APPOINTMENT (OUTPATIENT)
Dept: ULTRASOUND IMAGING | Age: 78
End: 2018-12-20
Attending: EMERGENCY MEDICINE
Payer: MEDICARE

## 2018-12-20 VITALS
HEART RATE: 80 BPM | RESPIRATION RATE: 18 BRPM | BODY MASS INDEX: 24 KG/M2 | TEMPERATURE: 98 F | WEIGHT: 150 LBS | SYSTOLIC BLOOD PRESSURE: 138 MMHG | DIASTOLIC BLOOD PRESSURE: 78 MMHG | OXYGEN SATURATION: 100 %

## 2018-12-20 DIAGNOSIS — R60.0 LOWER EXTREMITY EDEMA: Primary | ICD-10-CM

## 2018-12-20 PROCEDURE — 36415 COLL VENOUS BLD VENIPUNCTURE: CPT | Performed by: EMERGENCY MEDICINE

## 2018-12-20 PROCEDURE — 85025 COMPLETE CBC W/AUTO DIFF WBC: CPT | Performed by: EMERGENCY MEDICINE

## 2018-12-20 PROCEDURE — 80053 COMPREHEN METABOLIC PANEL: CPT | Performed by: EMERGENCY MEDICINE

## 2018-12-20 PROCEDURE — 99284 EMERGENCY DEPT VISIT MOD MDM: CPT | Performed by: EMERGENCY MEDICINE

## 2018-12-20 PROCEDURE — 93970 EXTREMITY STUDY: CPT | Performed by: EMERGENCY MEDICINE

## 2018-12-20 RX ORDER — FUROSEMIDE 20 MG/1
20 TABLET ORAL DAILY
Qty: 3 TABLET | Refills: 0 | Status: ON HOLD | OUTPATIENT
Start: 2018-12-20 | End: 2018-12-29

## 2018-12-20 NOTE — ED PROVIDER NOTES
Patient Seen in: [facility] Emergency Department    History   Patient presents with:  Cath Tube Problem (gastrointestinal, urinary, integumentary)    Stated Complaint: cath leaking    HPI    12-year-old is here with his wife, with a complaint that t Systems    Positive for stated complaint: cath leaking  Other systems are as noted in HPI. Constitutional and vital signs reviewed. All other systems reviewed and negative except as noted above.     Physical Exam     ED Triage Vitals [12/19/18 2221] and Physician follow up plan was discussed. The patient is discharged home in good condition.         Disposition and Plan     Clinical Impression:  Cuellar catheter problem, initial encounter ([facility] [address]  (primary encounter diagnosis)    Disposition:  There i

## 2018-12-20 NOTE — ED NOTES
Discharge instructions provided to patient. Leg bag exchanged on patient's lockwood catheter, teaching done with patient and family regarding lockwood catheter care. Verbalized understanding.

## 2018-12-20 NOTE — ED INITIAL ASSESSMENT (HPI)
Pt to ED with complaints of leaking to leg bag to lockwood catheter placed by urology. Pt denies any other complaints.

## 2018-12-20 NOTE — ED PROVIDER NOTES
Patient Seen in: [facility] Emergency Department In [place]    History   Patient presents with:  Deep Vein Thrombosis (cardiovascular)    Stated Complaint: DVT    HPI    17-year-old male who arrives here with complaints of leg swelling.   The patient state date: 10/18/2011        Years since quittin.1      Smokeless tobacco: Never Used    Alcohol use:  Yes      Alcohol/week: 0.6 oz      Types: 1 Glasses of wine per week      Comment: 1-2 a month    Drug use: No      Review of Systems    Positive for state (*)     HCT 36.4 (*)     MCHC 30.8 (*)     RDW-SD 46.6 (*)     All other components within normal limits   CBC WITH DIFFERENTIAL WITH PLATELET    Narrative: The following orders were created for panel order CBC WITH DIFFERENTIAL WITH PLATELET.   Procedu a Cuellar catheter and therefore he keeps his feet down more. I discussed would be the most conservative treatment is to try to keep his feet elevated but they can all use so use a ANA hose or some compression stockings.   Discussed importance of close follo

## 2018-12-23 ENCOUNTER — APPOINTMENT (OUTPATIENT)
Dept: GENERAL RADIOLOGY | Facility: HOSPITAL | Age: 78
DRG: 698 | End: 2018-12-23
Payer: MEDICARE

## 2018-12-23 ENCOUNTER — HOSPITAL ENCOUNTER (INPATIENT)
Facility: HOSPITAL | Age: 78
LOS: 7 days | Discharge: NURSING, REHAB OR LONG-TERM CARE | DRG: 698 | End: 2018-12-31
Admitting: HOSPITALIST
Payer: MEDICARE

## 2018-12-23 DIAGNOSIS — R50.9 FEVER, UNSPECIFIED FEVER CAUSE: Primary | ICD-10-CM

## 2018-12-23 DIAGNOSIS — N39.0 URINARY TRACT INFECTION WITHOUT HEMATURIA, SITE UNSPECIFIED: ICD-10-CM

## 2018-12-23 DIAGNOSIS — L03.115 CELLULITIS OF RIGHT LOWER EXTREMITY: ICD-10-CM

## 2018-12-23 PROCEDURE — 71045 X-RAY EXAM CHEST 1 VIEW: CPT

## 2018-12-23 PROCEDURE — 0T2BX0Z CHANGE DRAINAGE DEVICE IN BLADDER, EXTERNAL APPROACH: ICD-10-PCS | Performed by: HOSPITALIST

## 2018-12-23 RX ORDER — ACETAMINOPHEN 500 MG
1000 TABLET ORAL ONCE
Status: COMPLETED | OUTPATIENT
Start: 2018-12-23 | End: 2018-12-23

## 2018-12-23 RX ORDER — SODIUM CHLORIDE 9 MG/ML
INJECTION, SOLUTION INTRAVENOUS CONTINUOUS
Status: DISCONTINUED | OUTPATIENT
Start: 2018-12-23 | End: 2018-12-24

## 2018-12-23 RX ORDER — SODIUM CHLORIDE 9 MG/ML
1000 INJECTION, SOLUTION INTRAVENOUS ONCE
Status: DISCONTINUED | OUTPATIENT
Start: 2018-12-23 | End: 2018-12-24

## 2018-12-23 RX ORDER — ACETAMINOPHEN 500 MG
1000 TABLET ORAL ONCE
Status: DISCONTINUED | OUTPATIENT
Start: 2018-12-23 | End: 2018-12-23

## 2018-12-24 ENCOUNTER — APPOINTMENT (OUTPATIENT)
Dept: CT IMAGING | Facility: HOSPITAL | Age: 78
DRG: 698 | End: 2018-12-24
Attending: INTERNAL MEDICINE
Payer: MEDICARE

## 2018-12-24 PROBLEM — R50.9 FEVER, UNSPECIFIED FEVER CAUSE: Status: ACTIVE | Noted: 2018-12-24

## 2018-12-24 PROBLEM — L03.115 CELLULITIS OF RIGHT LOWER EXTREMITY: Status: ACTIVE | Noted: 2018-12-24

## 2018-12-24 LAB
ALBUMIN SERPL-MCNC: 3.2 G/DL (ref 3.1–4.5)
ALBUMIN/GLOB SERPL: 0.7 {RATIO} (ref 1–2)
ALP LIVER SERPL-CCNC: 66 U/L (ref 45–117)
ALT SERPL-CCNC: 15 U/L (ref 17–63)
ANION GAP SERPL CALC-SCNC: 6 MMOL/L (ref 0–18)
ANION GAP SERPL CALC-SCNC: 6 MMOL/L (ref 0–18)
AST SERPL-CCNC: 25 U/L (ref 15–41)
BASOPHILS # BLD AUTO: 0.06 X10(3) UL (ref 0–0.1)
BASOPHILS NFR BLD AUTO: 0.5 %
BILIRUB SERPL-MCNC: 0.4 MG/DL (ref 0.1–2)
BILIRUB UR QL STRIP.AUTO: NEGATIVE
BUN BLD-MCNC: 15 MG/DL (ref 8–20)
BUN BLD-MCNC: 21 MG/DL (ref 8–20)
BUN/CREAT SERPL: 19 (ref 10–20)
BUN/CREAT SERPL: 21 (ref 10–20)
CALCIUM BLD-MCNC: 8.2 MG/DL (ref 8.3–10.3)
CALCIUM BLD-MCNC: 8.7 MG/DL (ref 8.3–10.3)
CHLORIDE SERPL-SCNC: 100 MMOL/L (ref 101–111)
CHLORIDE SERPL-SCNC: 106 MMOL/L (ref 101–111)
CO2 SERPL-SCNC: 24 MMOL/L (ref 22–32)
CO2 SERPL-SCNC: 25 MMOL/L (ref 22–32)
CREAT BLD-MCNC: 0.79 MG/DL (ref 0.7–1.3)
CREAT BLD-MCNC: 1 MG/DL (ref 0.7–1.3)
EOSINOPHIL # BLD AUTO: 0.06 X10(3) UL (ref 0–0.3)
EOSINOPHIL NFR BLD AUTO: 0.5 %
ERYTHROCYTE [DISTWIDTH] IN BLOOD BY AUTOMATED COUNT: 13.7 % (ref 11.5–16)
GLOBULIN PLAS-MCNC: 4.5 G/DL (ref 2.8–4.4)
GLUCOSE BLD-MCNC: 103 MG/DL (ref 70–99)
GLUCOSE BLD-MCNC: 104 MG/DL (ref 70–99)
GLUCOSE UR STRIP.AUTO-MCNC: NEGATIVE MG/DL
HCT VFR BLD AUTO: 36.5 % (ref 37–53)
HGB BLD-MCNC: 11.7 G/DL (ref 13–17)
IMMATURE GRANULOCYTE COUNT: 0.07 X10(3) UL (ref 0–1)
IMMATURE GRANULOCYTE RATIO %: 0.6 %
LACTIC ACID: 1.1 MMOL/L (ref 0.5–2)
LIPASE: 121 U/L (ref 73–393)
LYMPHOCYTES # BLD AUTO: 1.08 X10(3) UL (ref 0.9–4)
LYMPHOCYTES NFR BLD AUTO: 8.6 %
M PROTEIN MFR SERPL ELPH: 7.7 G/DL (ref 6.4–8.2)
MCH RBC QN AUTO: 27.7 PG (ref 27–33.2)
MCHC RBC AUTO-ENTMCNC: 32.1 G/DL (ref 31–37)
MCV RBC AUTO: 86.5 FL (ref 80–99)
MONOCYTES # BLD AUTO: 0.46 X10(3) UL (ref 0.1–1)
MONOCYTES NFR BLD AUTO: 3.7 %
NEUTROPHIL ABS PRELIM: 10.79 X10 (3) UL (ref 1.3–6.7)
NEUTROPHILS # BLD AUTO: 10.79 X10(3) UL (ref 1.3–6.7)
NEUTROPHILS NFR BLD AUTO: 86.1 %
NITRITE UR QL STRIP.AUTO: POSITIVE
OSMOLALITY SERPL CALC.SUM OF ELEC: 275 MOSM/KG (ref 275–295)
OSMOLALITY SERPL CALC.SUM OF ELEC: 283 MOSM/KG (ref 275–295)
PH UR STRIP.AUTO: 5 [PH] (ref 4.5–8)
PLATELET # BLD AUTO: 250 10(3)UL (ref 150–450)
POTASSIUM SERPL-SCNC: 4.2 MMOL/L (ref 3.6–5.1)
POTASSIUM SERPL-SCNC: 5.1 MMOL/L (ref 3.6–5.1)
PROT UR STRIP.AUTO-MCNC: 30 MG/DL
RBC # BLD AUTO: 4.22 X10(6)UL (ref 3.8–5.8)
RBC #/AREA URNS AUTO: >10 /HPF
RED CELL DISTRIBUTION WIDTH-SD: 43 FL (ref 35.1–46.3)
SODIUM SERPL-SCNC: 131 MMOL/L (ref 136–144)
SODIUM SERPL-SCNC: 136 MMOL/L (ref 136–144)
SP GR UR STRIP.AUTO: 1.01 (ref 1–1.03)
UROBILINOGEN UR STRIP.AUTO-MCNC: <2 MG/DL
WBC # BLD AUTO: 12.5 X10(3) UL (ref 4–13)
WBC #/AREA URNS AUTO: >50 /HPF
WBC CLUMPS UR QL AUTO: PRESENT

## 2018-12-24 PROCEDURE — 74176 CT ABD & PELVIS W/O CONTRAST: CPT | Performed by: INTERNAL MEDICINE

## 2018-12-24 RX ORDER — TEMAZEPAM 15 MG/1
15 CAPSULE ORAL NIGHTLY PRN
Status: DISCONTINUED | OUTPATIENT
Start: 2018-12-24 | End: 2018-12-31

## 2018-12-24 RX ORDER — ONDANSETRON 2 MG/ML
4 INJECTION INTRAMUSCULAR; INTRAVENOUS EVERY 6 HOURS PRN
Status: DISCONTINUED | OUTPATIENT
Start: 2018-12-24 | End: 2018-12-31

## 2018-12-24 RX ORDER — ACETAMINOPHEN 325 MG/1
650 TABLET ORAL EVERY 4 HOURS PRN
Status: DISCONTINUED | OUTPATIENT
Start: 2018-12-24 | End: 2018-12-31

## 2018-12-24 RX ORDER — ASPIRIN 81 MG/1
81 TABLET, CHEWABLE ORAL EVERY OTHER DAY
Status: DISCONTINUED | OUTPATIENT
Start: 2018-12-25 | End: 2018-12-31

## 2018-12-24 RX ORDER — ASPIRIN 81 MG/1
81 TABLET, CHEWABLE ORAL EVERY OTHER DAY
Status: DISCONTINUED | OUTPATIENT
Start: 2018-12-24 | End: 2018-12-24 | Stop reason: SDUPTHER

## 2018-12-24 RX ORDER — LIDOCAINE HYDROCHLORIDE 20 MG/ML
10 JELLY TOPICAL ONCE
Status: COMPLETED | OUTPATIENT
Start: 2018-12-24 | End: 2018-12-24

## 2018-12-24 RX ORDER — ALPRAZOLAM 0.25 MG/1
0.25 TABLET ORAL NIGHTLY PRN
Status: DISCONTINUED | OUTPATIENT
Start: 2018-12-24 | End: 2018-12-31

## 2018-12-24 RX ORDER — BISACODYL 10 MG
10 SUPPOSITORY, RECTAL RECTAL
Status: DISCONTINUED | OUTPATIENT
Start: 2018-12-24 | End: 2018-12-31

## 2018-12-24 RX ORDER — ALFUZOSIN HYDROCHLORIDE 10 MG/1
10 TABLET, EXTENDED RELEASE ORAL
Status: DISCONTINUED | OUTPATIENT
Start: 2018-12-24 | End: 2018-12-31

## 2018-12-24 RX ORDER — SODIUM CHLORIDE 9 MG/ML
INJECTION, SOLUTION INTRAVENOUS CONTINUOUS
Status: ACTIVE | OUTPATIENT
Start: 2018-12-24 | End: 2018-12-24

## 2018-12-24 RX ORDER — AMANTADINE HYDROCHLORIDE 100 MG/1
100 TABLET ORAL 2 TIMES DAILY
Status: DISCONTINUED | OUTPATIENT
Start: 2018-12-24 | End: 2018-12-31

## 2018-12-24 RX ORDER — METOCLOPRAMIDE HYDROCHLORIDE 5 MG/ML
10 INJECTION INTRAMUSCULAR; INTRAVENOUS EVERY 8 HOURS PRN
Status: DISCONTINUED | OUTPATIENT
Start: 2018-12-24 | End: 2018-12-31

## 2018-12-24 RX ORDER — HYDRALAZINE HYDROCHLORIDE 25 MG/1
25 TABLET, FILM COATED ORAL EVERY 6 HOURS PRN
Status: DISCONTINUED | OUTPATIENT
Start: 2018-12-24 | End: 2018-12-25

## 2018-12-24 RX ORDER — RASAGILINE 0.5 MG/1
1 TABLET ORAL DAILY
Status: DISCONTINUED | OUTPATIENT
Start: 2018-12-24 | End: 2018-12-31

## 2018-12-24 RX ORDER — MAGNESIUM OXIDE 400 MG (241.3 MG MAGNESIUM) TABLET
3 TABLET NIGHTLY PRN
Status: DISCONTINUED | OUTPATIENT
Start: 2018-12-24 | End: 2018-12-31

## 2018-12-24 RX ORDER — ACETAMINOPHEN 325 MG/1
650 TABLET ORAL EVERY 6 HOURS PRN
Status: DISCONTINUED | OUTPATIENT
Start: 2018-12-24 | End: 2018-12-24

## 2018-12-24 RX ORDER — SODIUM CHLORIDE 9 MG/ML
INJECTION, SOLUTION INTRAVENOUS CONTINUOUS
Status: DISCONTINUED | OUTPATIENT
Start: 2018-12-24 | End: 2018-12-25

## 2018-12-24 RX ORDER — FINASTERIDE 5 MG/1
5 TABLET, FILM COATED ORAL NIGHTLY
Status: DISCONTINUED | OUTPATIENT
Start: 2018-12-24 | End: 2018-12-31

## 2018-12-24 RX ORDER — ENOXAPARIN SODIUM 100 MG/ML
40 INJECTION SUBCUTANEOUS DAILY
Status: DISCONTINUED | OUTPATIENT
Start: 2018-12-24 | End: 2018-12-31

## 2018-12-24 RX ORDER — BISACODYL 10 MG
10 SUPPOSITORY, RECTAL RECTAL DAILY
Status: DISCONTINUED | OUTPATIENT
Start: 2018-12-24 | End: 2018-12-24

## 2018-12-24 NOTE — CONSULTS
INFECTIOUS DISEASE CONSULTATION    Andrés Foot Patient Status:  Inpatient    1940 MRN [id number]   [facility] 4NW-A Attending Lucero Muller MD   Hosp Day # 0 PCP Samantha Robins has never used smokeless tobacco. He reports that he drinks about 0.6 oz of alcohol per week. He reports that he does not use drugs.     Allergies:    Penicillins             RASH    Medications:    Current Facility-Administered Medications:   •  ALPRAZolam needed. Disp:  Rfl: 1   Rasagiline Mesylate 1 MG Oral Tab Take 1 mg by mouth daily. Disp:  Rfl:    Amantadine HCl 100 MG Oral Cap Take 100 mg by mouth 2 (two) times daily.  Disp:  Rfl:    temazepam 15 MG Oral Cap Take 15 mg by mouth nightly as needed for 10.79*   WBC  12.5   PLT  250.0       Recent Labs   Lab  12/20/18   1704  12/23/18   2349  12/24/18   0608   GLU  105*  103*  104*   BUN  23*  21*  15   CREATSERUM  0.74  1.00  0.79   GFRAA  102  83  99   GFRNAA  88  72  86   CA  8.6  8.7  8.2*   ALB  3.0*

## 2018-12-24 NOTE — PROGRESS NOTES
[facility] dosing service    Initial Pharmacokinetic Consult for Vancomycin Dosing     Ro Daniels is a 66year old male admitted on 12/24/18 who is being treated for UTI/cellulitis. Pharmacy has been asked to dose Vancomycin by Dr. Alex Britt.     He is Pharmacy will follow and monitor renal function changes, toxicity and efficacy. Pharmacy will continue to follow him. We appreciate the opportunity to assist in his care.     Ada Hammans, PharmD  12/24/2018  3:51 AM  [facility]: [phone]

## 2018-12-24 NOTE — CM/SW NOTE
12/24/18 1000   CM/SW Referral Data   Referral Source Social Work (self-referral)   Reason for Referral Discharge planning   Patient Info   Patient's Mental Status Alert;Oriented   Patient's [address]   Patient lives with Spouse   Discharge

## 2018-12-24 NOTE — PLAN OF CARE
Dr J Luis Shaffer returned page regarding positive blood cultures. Results of ct of abd also read to md. No new orders received.

## 2018-12-24 NOTE — PLAN OF CARE
Impaired Functional Mobility    • Achieve highest/safest level of mobility/gait Progressing        MUSCULOSKELETAL - ADULT    • Return mobility to safest level of function Progressing        Patient/Family Goals    • Patient/Family Long Term Goal Progressi

## 2018-12-24 NOTE — ED PROVIDER NOTES
Patient Seen in: [facility] Emergency Department    History   Patient presents with: Body ache and/or chills  Urinary Symptoms (urologic)    Stated Complaint: chills, recent urosepsis.  indwelling catheter     HPI    Patient presents with fever sinc per week      Comment: 1-2 a month    Drug use: No      Review of Systems    Positive for stated complaint: chills, recent urosepsis. indwelling catheter   Other systems are as noted in HPI. Constitutional and vital signs reviewed.       All other systems COMP METABOLIC PANEL (14) - Abnormal; Notable for the following components:    Glucose 103 (*)     Sodium 131 (*)     Chloride 100 (*)     BUN 21 (*)     BUN/CREA Ratio 21.0 (*)     Alt 15 (*)     Globulin  4.5 (*)     A/G Ratio 0.7 (*)     All other com HISTORY: (As transcribed by Technologist)  Patient indicates he has leg swelling on both legs. FINDINGS:  THROMBI:  None visible. COMPRESSION:  Normal compressibility, phasicity, and augmentation. OTHER:  Negative.       CONCLUSION:  No acute deep vein t

## 2018-12-24 NOTE — ED INITIAL ASSESSMENT (HPI)
Patient here with his wife. Patient complaining of possible fever at home and chills that started at 1 pm today. Denies cough. Denies n/v/d. Patient reports he was admitted a couple weeks ago for a UTI. Patient with a lockwood catheter.  History of BPH with re

## 2018-12-24 NOTE — PAYOR COMM NOTE
--------------  ADMISSION REVIEW     Payor: [address]  Subscriber #:  [id number]  Authorization Number: [id number]    Admit date: 12/24/18  Admit time: 12       Admitting Physician: Jan Up MD  Attending Physician:  Jacinto Gonzalez MD  Pr Ratio 0.7 (*)     All other components within normal limits   CBC W/ DIFFERENTIAL - Abnormal; Notable for the following components:    HGB 11.7 (*)     HCT 36.5 (*)     Neutrophil Absolute Prelim 10.79 (*)     Neutrophil Absolute 10.79 (*)     All other co Oral Magda Chambers RN      ALPRAZolam Sukumar Thompson) tab 0.25 mg     Date Action Dose Route User    12/24/2018 0403 Given 0.25 mg Oral Francydaniella Toribio RN      amantadine (SYMMETREL) cap/tab 100 mg     Date Action Dose Route User    12/24/2018 0750 Given 100 illness without focal infection    PLEASE FAX DAYS CERTIFIED AND NEXT REVIEW DATE

## 2018-12-24 NOTE — PROGRESS NOTES
NURSING ADMISSION NOTE      Patient admitted via Cart  Oriented to room. Safety precautions initiated. Bed in low position. Call light in reach. Admitted with UTI. Chronic lockwood with retention. Started on vanco and merrem. A&O times 4.   Wife a

## 2018-12-24 NOTE — ED NOTES
Transport cancelled per Dr. Tip Hinotn request. Per Dr. Nina Ordonez, she will see patient in ED first before transport.

## 2018-12-24 NOTE — PHYSICAL THERAPY NOTE
PHYSICAL THERAPY EVALUATION - INPATIENT     Room Number: 417/417-A  Evaluation Date: 12/24/2018  Type of Evaluation: Initial  Physician Order: PT Eval and Treat    Presenting Problem: R LE cellulitis, UTI  Reason for Therapy: Mobility Dysfunction and typically independent with ADL and mobility. Pt does not use AD. Pts wife reports that she has been assisting with LBD recently. SUBJECTIVE  \"My legs feel weak. \"     Patient self-stated goal is to get stronger.      OBJECTIVE     Fall Risk: High fall Standardized Score (AM-PAC Scale): 42.13   CMS Modifier (G-Code): CK    FUNCTIONAL ABILITY STATUS  Gait Assessment   Gait Assistance:  Moderate assistance  Distance (ft): 150  Assistive Device: (IV pole)  Pattern: (narrow JUNIOR, excessive lateral sway) clinical presentation is evolving and overall the evaluation complexity is considered moderate. These impairments and comorbidities manifest themselves as functional limitations in independent bed mobility, transfers, and gait.  The patient is below baseli

## 2018-12-24 NOTE — H&P
IVA HOSPITALIST  History and Physical     Isabel Isauro Patient Status:  Inpatient    1940 MRN [id number]   [facility] 4NW-A Attending Gatito De La Cruz MD   Hosp Day # 0 PCP Denisse Barksdale MD     Chief Complaint: Fever    History o ago. He has a 3.00 pack-year smoking history. he has never used smokeless tobacco. He reports that he drinks about 0.6 oz of alcohol per week. He reports that he does not use drugs.     Family History:   Family History   Problem Relation Age of Onset   • He noted in the HPI. Physical Exam:    /46 (BP Location: Right arm)   Pulse 102   Temp 99 °F (37.2 °C) (Oral)   Resp 18   Ht 5' 9\" (1.753 m)   Wt 153 lb 3.2 oz (69.5 kg)   SpO2 95%   BMI 22.62 kg/m²   General: No acute distress.  Alert and oriented x resume home meds  6. DL, statin  7. HTN, controlled  8. Hyponatremia, on IVF, f/u BMP    Quality:  · DVT Prophylaxis: lovenox  · CODE status: Full  · Cuellar: no    Plan of care discussed with patient and wife.     Rodolfo Bumpers, MD  00/26/1592          **Jaleel Donahue

## 2018-12-25 ENCOUNTER — APPOINTMENT (OUTPATIENT)
Dept: GENERAL RADIOLOGY | Facility: HOSPITAL | Age: 78
DRG: 698 | End: 2018-12-25
Attending: HOSPITALIST
Payer: MEDICARE

## 2018-12-25 PROCEDURE — 99232 SBSQ HOSP IP/OBS MODERATE 35: CPT | Performed by: HOSPITALIST

## 2018-12-25 PROCEDURE — 71045 X-RAY EXAM CHEST 1 VIEW: CPT | Performed by: HOSPITALIST

## 2018-12-25 RX ORDER — IPRATROPIUM BROMIDE AND ALBUTEROL SULFATE 2.5; .5 MG/3ML; MG/3ML
3 SOLUTION RESPIRATORY (INHALATION) EVERY 4 HOURS PRN
Status: DISCONTINUED | OUTPATIENT
Start: 2018-12-25 | End: 2018-12-31

## 2018-12-25 RX ORDER — IBUPROFEN 400 MG/1
400 TABLET ORAL ONCE
Status: COMPLETED | OUTPATIENT
Start: 2018-12-25 | End: 2018-12-25

## 2018-12-25 RX ORDER — FUROSEMIDE 10 MG/ML
40 INJECTION INTRAMUSCULAR; INTRAVENOUS ONCE
Status: COMPLETED | OUTPATIENT
Start: 2018-12-25 | End: 2018-12-25

## 2018-12-25 NOTE — PROGRESS NOTES
IVA HOSPITALIST  Progress Note     Arleen Lucero Patient Status:  Inpatient    1940 MRN [id number]   [facility] 4NW-A Attending Quinton Reese MD   Hosp Day # 1 PCP Juan Garcia MD     Chief Complaint: sepsis    S: Patient feeli Oral Daily   • Alfuzosin HCl ER  10 mg Oral Daily with breakfast   • enoxaparin  40 mg Subcutaneous Daily   • metoprolol Tartrate  12.5 mg Oral 2x Daily(Beta Blocker)   • meropenem  500 mg Intravenous Q8H   • aspirin  81 mg Oral QOD       ASSESSMENT / PLAN

## 2018-12-25 NOTE — PLAN OF CARE
Notified Dr Robinson Ybarra that when patient was returning from bathroom to bed, patient became very shortness of breath, and desated to 72 while he was with patient care technician, upon entering room cpox was at 95%, and patient has no complaints, didn't have a

## 2018-12-25 NOTE — PROGRESS NOTES
INFECTIOUS DISEASE CONSULTATION    Tanya Murcia Patient Status:  Inpatient    1940 MRN [id number]   [facility] 4NW-A Attending Paul Hamlin MD   [facility] Day # 1 PCP Migel Desouza, Soraya Flores • Hypertension Sister    • Diabetes Brother    • Hypertension Brother       reports that he quit smoking about 7 years ago. He has a 3.00 pack-year smoking history.  he has never used smokeless tobacco. He reports that he drinks about 0.6 oz of alcohol pe mg by mouth nightly as needed. Disp:  Rfl: 1   Rasagiline Mesylate 1 MG Oral Tab Take 1 mg by mouth daily. Disp:  Rfl:    Amantadine HCl 100 MG Oral Cap Take 100 mg by mouth 2 (two) times daily.  Disp:  Rfl:    temazepam 15 MG Oral Cap Take 15 mg by mouth 36.5*   MCV  86.5   MCH  27.7   MCHC  32.1   RDW  13.7   NEPRELIM  10.79*   WBC  12.5   PLT  250.0       Recent Labs   Lab  12/20/18   1704  12/23/18   2349  12/24/18   0608   GLU  105*  103*  104*   BUN  23*  21*  15   CREATSERUM  0.74  1.00  0.79   GFRAA

## 2018-12-25 NOTE — PLAN OF CARE
Patient was seen by infectious disease, afebrile at this time, denies any pain, on room air,had blood culture drawn earlier this am, lockwood patent draining alejandrina urine.

## 2018-12-25 NOTE — PLAN OF CARE
Pt alert and oriented x4. Wife at bedside through the night. Pt febrile this evening with max temp of 102.7. MD notified and tylenol given. Pt given ice packs and cold washcloth on forehead. Upon reassessment, pt temp down to 99.6.   This morning pt te

## 2018-12-25 NOTE — PLAN OF CARE
Wife out in hallway stating patient \"SOB\". Expiratory wheezing, bilaterally, paged Dr Desmond Medley to give an update. Dr Desmond Medley returned page and new orders received, stat PCXR, d/c fluids, respiratory here administered duoneb. Tylenol 650mg PO for temp 103.

## 2018-12-25 NOTE — PLAN OF CARE
While rounding with nurse, patient was having expiratory wheezing, spoke to telemetry leads need to be reapplied, afternoon nurse will follow up.

## 2018-12-26 ENCOUNTER — APPOINTMENT (OUTPATIENT)
Dept: CV DIAGNOSTICS | Facility: HOSPITAL | Age: 78
DRG: 698 | End: 2018-12-26
Attending: INTERNAL MEDICINE
Payer: MEDICARE

## 2018-12-26 LAB
ANION GAP SERPL CALC-SCNC: 7 MMOL/L (ref 0–18)
BASOPHILS # BLD AUTO: 0.02 X10(3) UL (ref 0–0.1)
BASOPHILS NFR BLD AUTO: 0.2 %
BUN BLD-MCNC: 17 MG/DL (ref 8–20)
BUN/CREAT SERPL: 18.9 (ref 10–20)
CALCIUM BLD-MCNC: 7.9 MG/DL (ref 8.3–10.3)
CHLORIDE SERPL-SCNC: 104 MMOL/L (ref 101–111)
CO2 SERPL-SCNC: 25 MMOL/L (ref 22–32)
CREAT BLD-MCNC: 0.9 MG/DL (ref 0.7–1.3)
EOSINOPHIL # BLD AUTO: 0.2 X10(3) UL (ref 0–0.3)
EOSINOPHIL NFR BLD AUTO: 1.9 %
ERYTHROCYTE [DISTWIDTH] IN BLOOD BY AUTOMATED COUNT: 14.2 % (ref 11.5–16)
GLUCOSE BLD-MCNC: 117 MG/DL (ref 70–99)
HCT VFR BLD AUTO: 32.8 % (ref 37–53)
HGB BLD-MCNC: 10.6 G/DL (ref 13–17)
IMMATURE GRANULOCYTE COUNT: 0.06 X10(3) UL (ref 0–1)
IMMATURE GRANULOCYTE RATIO %: 0.6 %
LYMPHOCYTES # BLD AUTO: 0.73 X10(3) UL (ref 0.9–4)
LYMPHOCYTES NFR BLD AUTO: 7 %
MCH RBC QN AUTO: 28.5 PG (ref 27–33.2)
MCHC RBC AUTO-ENTMCNC: 32.3 G/DL (ref 31–37)
MCV RBC AUTO: 88.2 FL (ref 80–99)
MONOCYTES # BLD AUTO: 0.87 X10(3) UL (ref 0.1–1)
MONOCYTES NFR BLD AUTO: 8.3 %
NEUTROPHIL ABS PRELIM: 8.54 X10 (3) UL (ref 1.3–6.7)
NEUTROPHILS # BLD AUTO: 8.54 X10(3) UL (ref 1.3–6.7)
NEUTROPHILS NFR BLD AUTO: 82 %
OSMOLALITY SERPL CALC.SUM OF ELEC: 285 MOSM/KG (ref 275–295)
PLATELET # BLD AUTO: 157 10(3)UL (ref 150–450)
POTASSIUM SERPL-SCNC: 4.1 MMOL/L (ref 3.6–5.1)
RBC # BLD AUTO: 3.72 X10(6)UL (ref 3.8–5.8)
RED CELL DISTRIBUTION WIDTH-SD: 45.8 FL (ref 35.1–46.3)
SODIUM SERPL-SCNC: 136 MMOL/L (ref 136–144)
WBC # BLD AUTO: 10.4 X10(3) UL (ref 4–13)

## 2018-12-26 PROCEDURE — 93306 TTE W/DOPPLER COMPLETE: CPT | Performed by: INTERNAL MEDICINE

## 2018-12-26 PROCEDURE — 99232 SBSQ HOSP IP/OBS MODERATE 35: CPT | Performed by: HOSPITALIST

## 2018-12-26 PROCEDURE — 05H633Z INSERTION OF INFUSION DEVICE INTO LEFT SUBCLAVIAN VEIN, PERCUTANEOUS APPROACH: ICD-10-PCS | Performed by: HOSPITALIST

## 2018-12-26 RX ORDER — FUROSEMIDE 10 MG/ML
20 INJECTION INTRAMUSCULAR; INTRAVENOUS ONCE
Status: COMPLETED | OUTPATIENT
Start: 2018-12-26 | End: 2018-12-26

## 2018-12-26 RX ORDER — SODIUM CHLORIDE 0.9 % (FLUSH) 0.9 %
10 SYRINGE (ML) INJECTION EVERY 12 HOURS
Status: DISCONTINUED | OUTPATIENT
Start: 2018-12-26 | End: 2018-12-31

## 2018-12-26 NOTE — PLAN OF CARE
Absence of fever/infection during anticipated neutropenic period Not Progressing      Achieve highest/safest level of independence in self care Progressing      Achieve highest/safest level of mobility/gait Progressing      Return mobility to safest level

## 2018-12-26 NOTE — OCCUPATIONAL THERAPY NOTE
OCCUPATIONAL THERAPY EVALUATION - INPATIENT     Room Number: 417/417-A  Evaluation Date: 12/26/2018  Type of Evaluation: Initial  Presenting Problem: fever, SOB    Physician Order: IP Consult to Occupational Therapy  Reason for Therapy: ADL/IADL Dysfunctio reports that she has been assisting with LB dressing recently PRN    SUBJECTIVE   Pt stated, \"I just want to eat my breakfast.\"    Patient self-stated goal is to go home     OBJECTIVE     Fall Risk: High fall risk    WEIGHT BEARING RESTRICTION  Weight Be assistance  Sit to Stand:  Moderate assistance    Skilled Therapy Provided: Pt was received supine in bed for session, pt t/f to EOB with min assist for right leg, pt sat at EOB with Mod I, pt donned socks at EOB with Mod A and  O2 decreased to 85% on 3L, p Complexity  Occupational Profile/Medical History MODERATE - Expanded review of history including review of medical or therapy record   Specific performance deficits impacting engagement in ADL/IADL MODERATE  3 - 5 performance deficits   Client Assessment/P

## 2018-12-26 NOTE — CM/SW NOTE
PT recommending JUNIOR, discussed with spouse and son. JUNIOR list given. Per son will look over list and discuss with patient and mother. Encouraged son to pick facility today as it could take a few days for insurance auth.  Informed son that if patient improves

## 2018-12-26 NOTE — PROGRESS NOTES
[facility]                INFECTIOUS DISEASE PROGRESS NOTE    [facility] Patient Status:  Inpatient    1940 MRN [id number]   [facility] 4NW-A Attending Pat Salmeron MD   Hosp Day # 2 PCP Deonte Victor MD     Antibioti Urine Culture >100,000 CFU/ML Escherichia coli  ESBL Pos (A) N/A       Susceptibility    Escherichia coli  ESBL Pos -  (no method available)     Cefazolin >=64 Resistant      Cefepime  Resistant      Ceftazidime  Resistant      Ceftriaxone >=64 Resistan

## 2018-12-26 NOTE — PAYOR COMM NOTE
--------------  CONTINUED STAY REVIEW        12/25    UTI/CHRONIC LOCKWOOD      Reason for Consultation:  UTI/chronic lockwood     History of Present Illness:  Lucia Downs is a a(n) 66year old male with urinary retention, BPH, follows with urology at [facility], a

## 2018-12-26 NOTE — PROGRESS NOTES
IVA HOSPITALIST  Progress Note     [facility] Patient Status:  Inpatient    1940 MRN [id number]   [facility] 4NW-A Attending Pat Salmeron MD   Hosp Day # 2 PCP Deonte Victro MD     Chief Complaint: sepsis    S: Patient feeli 168 hours. Imaging: Imaging data reviewed in Epic.     Medications:   • Normal Saline Flush  10 mL Intravenous Q12H   • [START ON 12/27/2018] ertapenem  1 g Intravenous Daily   • furosemide  20 mg Intravenous Once   • Amantadine HCl  100 mg Oral BID

## 2018-12-26 NOTE — PHYSICAL THERAPY NOTE
PHYSICAL THERAPY TREATMENT NOTE - INPATIENT    Room Number: 417/417-A     Session: 1   Number of Visits to Meet Established Goals: 5    Presenting Problem: R LE cellulitis, UTI    History related to current admission: Pt is a 66 y.o.  Male admitted 12/23/1 Static Sitting: Fair -  Dynamic Sitting: Fair -           Static Standing: Fair -  Dynamic Standing: Poor +    ACTIVITY TOLERANCE                         O2 WALK        SPO2 Ambulation on Oxygen: 96  Ambulation oxygen flow (li and concerns addressed    ASSESSMENT   Pt seen for gait training, active flexibility and BLE strengthening, due to [facility] admission for R LE cellulitis, UTI.     Results of the AM-PAC \"6 clicks\" Inpatient Daily Mobility Short Form for the patient

## 2018-12-26 NOTE — HOME CARE LIAISON
Patient is current with [facility] for RN  Referral placed to [place] for IVAB should patient not qualify or find or agree to JUNIOR  Able to cancel if JUNIOR will accept    Thanks

## 2018-12-27 ENCOUNTER — APPOINTMENT (OUTPATIENT)
Dept: GENERAL RADIOLOGY | Facility: HOSPITAL | Age: 78
DRG: 698 | End: 2018-12-27
Attending: HOSPITALIST
Payer: MEDICARE

## 2018-12-27 PROCEDURE — 99232 SBSQ HOSP IP/OBS MODERATE 35: CPT | Performed by: HOSPITALIST

## 2018-12-27 PROCEDURE — 71045 X-RAY EXAM CHEST 1 VIEW: CPT | Performed by: HOSPITALIST

## 2018-12-27 NOTE — PROGRESS NOTES
IVA HOSPITALIST  Progress Note     Gerrado Olivera Patient Status:  Inpatient    1940 MRN [id number]   [facility] 4NW-A Attending Louisa Barton MD   Hosp Day # 3 PCP Franco Meza MD     Chief Complaint: sepsis    S: still with [facility] Saline Flush  10 mL Intravenous Q12H   • ertapenem  1 g Intravenous Daily   • Amantadine HCl  100 mg Oral BID   • carbidopa-levodopa  1 tablet Oral QID   • finasteride  5 mg Oral Nightly   • Rasagiline Mesylate  1 mg Oral Daily   • Alfuzosin HCl ER  10 mg

## 2018-12-27 NOTE — PROGRESS NOTES
[facility]                INFECTIOUS DISEASE PROGRESS NOTE    Matheus Ramon Patient Status:  Inpatient    1940 MRN [id number]   [facility] 4NW-A Attending Rohan Devine MD   Hosp Day # 3 PCP Irene Sinha MD     Antibioti Ref Range    Blood Culture Result No Growth 1 Day N/A   2. URINE CULTURE, ROUTINE     Status: Abnormal    Collection Time: 12/23/18 11:49 PM   Result Value Ref Range    Urine Culture >100,000 CFU/ML Escherichia coli  ESBL Pos (A) N/A       Susceptibility

## 2018-12-27 NOTE — OCCUPATIONAL THERAPY NOTE
OCCUPATIONAL THERAPY TREATMENT NOTE - INPATIENT     Room Number: 417/417-A  Session: 1   Number of Visits to Meet Established Goals: 5    Presenting Problem: fever, SOB    History related to current admission: Pt is a 66year old male admit on 12/23/2018 f minute): 3    ACTIVITIES OF DAILY LIVING ASSESSMENT  AM-PAC ‘6-Clicks’ Inpatient Daily Activity Short Form  How much help from another person does the patient currently need…  -   Putting on and taking off regular lower body clothing?: A Lot  -   Bathing ( These deficits continue manifest functionally while performing mobility and self-care.    The patient is below baseline and would benefit from skilled inpatient OT to address the above deficits, maximizing patient's ability to return to prior level of funct

## 2018-12-27 NOTE — PROGRESS NOTES
12/27/18 0916   Mobility   O2 walk?  Yes   SPO2 on Room Air at Rest 99   SPO2 Ambulation on Room Air 87   SPO2 Ambulation on Oxygen 92   Ambulation oxygen flow (liters per minute) 3

## 2018-12-27 NOTE — CM/SW NOTE
Spoke with Derian Jarrell from [place] who states that patient's co-pay will be $10 a week with all supplies included. CM to inform family of above.     Flower Hampton, MSN RN, [facility]/  P: [phone]

## 2018-12-27 NOTE — CM/SW NOTE
Family now stating that they want to choose a JUNIOR. Caitlyn Newby chosen but not in network. Tigist Martin chosen and referral sent via [facility]. DON requested.  Informed patient there may be a delay in discharge due to acquiring insurance au

## 2018-12-28 LAB — PLATELET # BLD AUTO: 246 10(3)UL (ref 150–450)

## 2018-12-28 PROCEDURE — 99232 SBSQ HOSP IP/OBS MODERATE 35: CPT | Performed by: HOSPITALIST

## 2018-12-28 NOTE — PLAN OF CARE
GENITOURINARY - ADULT    • Absence of urinary retention Progressing        Impaired Functional Mobility    • Achieve highest/safest level of mobility/gait Progressing        Patient/Family Goals    • Patient/Family Long Term Goal Progressing    • Patient/F

## 2018-12-28 NOTE — CM/SW NOTE
Interdisciplinary Rounds: 12/28/18  Admitted: 12/23/2018 LOS: 4  Disciplines in attendance: charge nurse, staff nurse, CM, [address] and discharge plan reviewed.  MBM-Nap    Active issues needing resolution prior to discharge: Fever of 102, if remain

## 2018-12-28 NOTE — PHYSICAL THERAPY NOTE
PHYSICAL THERAPY TREATMENT NOTE - INPATIENT    Room Number: 417/417-A     Session: 2  Number of Visits to Meet Established Goals: 5    Presenting Problem: R LE cellulitis, UTI    History related to current admission: Pt is a 66 y.o.  Male admitted 12/23/18 Static Sitting: Fair -  Dynamic Sitting: Fair -           Static Standing: Poor +  Dynamic Standing: Poor +    ACTIVITY TOLERANCE                         O2 WALK        SPO2 Ambulation on Oxygen: 91  Ambulation oxygen flow (liters per m present    ASSESSMENT   Pt seen for gait training, active flexibility and BLE strengthening, due to [facility] admission for R LE cellulitis, UTI.     Pt continues to present with impaired strength, endurance and balance below PLOF and increased O2 nee

## 2018-12-28 NOTE — OCCUPATIONAL THERAPY NOTE
OCCUPATIONAL THERAPY TREATMENT NOTE - INPATIENT     Room Number: 417/417-A  Session: 1   Number of Visits to Meet Established Goals: 5    Presenting Problem: fever, SOB    History related to current admission: Pt is a 66year old male admit on 12/23/2018 f another person does the patient currently need…  -   Putting on and taking off regular lower body clothing?: A Lot  -   Bathing (including washing, rinsing, drying)?: A Lot  -   Toileting, which includes using toilet, bedpan or urinal? : A Lot  -   Putting maximizing patient's ability to return to prior level of function. Subacute rehab is recommended for 12-14 days. After this period of rehabilitation patient should achieve Mod I level in all ADLs and IADLs.       OT Discharge Recommendations: Sub-acute

## 2018-12-28 NOTE — CM/SW NOTE
Per Corin Santo from "[employer]. , still pending. Corin Santo will call business office to expedite insurance auth prior to weekend.     Annel Fitzgerald, MSN RN, CTL/  P: [phone]

## 2018-12-28 NOTE — PLAN OF CARE
Resting in bed. Denies pain & no SOB noted. Occasional coughing noted w/ no phlegm. Maintained on 2L O2 per NC. Ate breakfast & lunch w/ fair appetite,assisted by spouse & son. Needs moderate assistance w/ transfer needs. Due IV antibiotics for UTI administere

## 2018-12-28 NOTE — PROGRESS NOTES
[facility]                INFECTIOUS DISEASE PROGRESS NOTE    Christi Haque Patient Status:  Inpatient    1940 MRN [id number]   [facility] 4NW-A Attending Cindy Ross MD   Hosp Day # 4 PCP Johnson Angelo MD     Antibioti Escherichia coli  ESBL Pos (A) N/A       Susceptibility    Escherichia coli  ESBL Pos -  (no method available)     Cefazolin >=64 Resistant      Cefepime  Resistant      Ceftazidime  Resistant      Ceftriaxone >=64 Resistant      Ciprofloxacin >=4 Resistan

## 2018-12-28 NOTE — PLAN OF CARE
GENITOURINARY - ADULT    • Absence of urinary retention Progressing        Impaired Functional Mobility    • Achieve highest/safest level of mobility/gait Progressing        SAFETY ADULT - FALL    • Free from fall injury Progressing          Patient A+Ox4.

## 2018-12-28 NOTE — PROGRESS NOTES
IVA HOSPITALIST  Progress Note     Royal Lee Patient Status:  Inpatient    1940 MRN [id number]   [facility] 4NW-A Attending Eder Cheng MD   Hosp Day # 4 PCP Nanci Morris MD     Chief Complaint: sepsis    S: feeling george carbidopa-levodopa  1 tablet Oral QID   • finasteride  5 mg Oral Nightly   • Rasagiline Mesylate  1 mg Oral Daily   • Alfuzosin HCl ER  10 mg Oral Daily with breakfast   • enoxaparin  40 mg Subcutaneous Daily   • metoprolol Tartrate  12.5 mg Oral 2x Daily(

## 2018-12-29 PROCEDURE — 99232 SBSQ HOSP IP/OBS MODERATE 35: CPT | Performed by: HOSPITALIST

## 2018-12-29 RX ORDER — FUROSEMIDE 10 MG/ML
20 INJECTION INTRAMUSCULAR; INTRAVENOUS ONCE
Status: COMPLETED | OUTPATIENT
Start: 2018-12-29 | End: 2018-12-29

## 2018-12-29 RX ORDER — DOCUSATE SODIUM 100 MG/1
100 CAPSULE, LIQUID FILLED ORAL 2 TIMES DAILY
Status: DISCONTINUED | OUTPATIENT
Start: 2018-12-29 | End: 2018-12-31

## 2018-12-29 NOTE — PROGRESS NOTES
[facility]                INFECTIOUS DISEASE PROGRESS NOTE    Wei De León Patient Status:  Inpatient    1940 MRN [id number]   [facility] 4NW-A Attending Burgess Ismael MD   Hosp Day # 5 PCP Thalia Ramirez MD     Antibioti URINE CULTURE, ROUTINE     Status: Abnormal    Collection Time: 12/23/18 11:49 PM   Result Value Ref Range    Urine Culture >100,000 CFU/ML Escherichia coli  ESBL Pos (A) N/A       Susceptibility    Escherichia coli  ESBL Pos -  (no method available)     C

## 2018-12-29 NOTE — PROGRESS NOTES
IVA HOSPITALIST  Progress Note     Tonya Cruz Patient Status:  Inpatient    1940 MRN [id number]   [facility] 4NW-A Attending Anjel Yusuf MD   Hosp Day # 5 PCP Dank Almazan MD     Chief Complaint: sepsis    S: feeling george Intravenous Q12H   • ertapenem  1 g Intravenous Daily   • Amantadine HCl  100 mg Oral BID   • carbidopa-levodopa  1 tablet Oral QID   • finasteride  5 mg Oral Nightly   • Rasagiline Mesylate  1 mg Oral Daily   • Alfuzosin HCl ER  10 mg Oral Daily with joana

## 2018-12-29 NOTE — PROGRESS NOTES
Alert,oriented,No complaint of pain,but unable to sleep. Restoril and Xanax given. Cuellar is intact and patent. Contact isolation for ESBL noted. Patient resting quietly at this time. Family at bedside.

## 2018-12-30 LAB
ANION GAP SERPL CALC-SCNC: 7 MMOL/L (ref 0–18)
BUN BLD-MCNC: 20 MG/DL (ref 8–20)
BUN/CREAT SERPL: 32.3 (ref 10–20)
CALCIUM BLD-MCNC: 8.4 MG/DL (ref 8.3–10.3)
CHLORIDE SERPL-SCNC: 100 MMOL/L (ref 101–111)
CO2 SERPL-SCNC: 29 MMOL/L (ref 22–32)
CREAT BLD-MCNC: 0.62 MG/DL (ref 0.7–1.3)
GLUCOSE BLD-MCNC: 119 MG/DL (ref 70–99)
OSMOLALITY SERPL CALC.SUM OF ELEC: 286 MOSM/KG (ref 275–295)
POTASSIUM SERPL-SCNC: 4.4 MMOL/L (ref 3.6–5.1)
SODIUM SERPL-SCNC: 136 MMOL/L (ref 136–144)

## 2018-12-30 PROCEDURE — 99232 SBSQ HOSP IP/OBS MODERATE 35: CPT | Performed by: HOSPITALIST

## 2018-12-30 RX ORDER — FUROSEMIDE 10 MG/ML
20 INJECTION INTRAMUSCULAR; INTRAVENOUS ONCE
Status: DISCONTINUED | OUTPATIENT
Start: 2018-12-30 | End: 2018-12-30

## 2018-12-30 RX ORDER — FUROSEMIDE 10 MG/ML
40 INJECTION INTRAMUSCULAR; INTRAVENOUS ONCE
Status: COMPLETED | OUTPATIENT
Start: 2018-12-30 | End: 2018-12-30

## 2018-12-30 NOTE — PROGRESS NOTES
IVA HOSPITALIST  Progress Note     Lucero Aramis Patient Status:  Inpatient    1940 MRN [id number]   [facility] 4NW-A Attending Jeremiah Jarquin MD   [facility] Day # 6 PCP Tran Tony MD     Chief Complaint: sepsis    S: feels stronge 100 mg Oral BID   • Normal Saline Flush  10 mL Intravenous Q12H   • ertapenem  1 g Intravenous Daily   • Amantadine HCl  100 mg Oral BID   • carbidopa-levodopa  1 tablet Oral QID   • finasteride  5 mg Oral Nightly   • Rasagiline Mesylate  1 mg Oral Daily

## 2018-12-30 NOTE — CM/SW NOTE
Call from RN that pt may be cleared medically for d/c today and wondered if JUNIRO has obtained insurance auth to admit. SW contacted [facility] [phone] and left message for admissions. Awaiting return call. ADDENDUM:  Spoke with Guru Hanson with [facility].

## 2018-12-30 NOTE — PROGRESS NOTES
% on O2 2 l n/c at rest.  94-95% on RA at rest  77% on RA with ambulation  92% on O2 4 L w/ambulation

## 2018-12-30 NOTE — PLAN OF CARE
Impaired Functional Mobility    • Achieve highest/safest level of mobility/gait Progressing          MUSCULOSKELETAL - ADULT    • Return mobility to safest level of function Progressing          RESPIRATORY - ADULT    • Achieves optimal ventilation and oxy

## 2018-12-31 ENCOUNTER — SNF ADMIT/H&P (OUTPATIENT)
Dept: FAMILY MEDICINE CLINIC | Facility: CLINIC | Age: 78
End: 2018-12-31

## 2018-12-31 VITALS
HEIGHT: 69 IN | WEIGHT: 159.69 LBS | RESPIRATION RATE: 18 BRPM | TEMPERATURE: 98 F | OXYGEN SATURATION: 96 % | HEART RATE: 72 BPM | BODY MASS INDEX: 23.65 KG/M2 | SYSTOLIC BLOOD PRESSURE: 123 MMHG | DIASTOLIC BLOOD PRESSURE: 60 MMHG

## 2018-12-31 DIAGNOSIS — N13.8 BPH WITH URINARY OBSTRUCTION: ICD-10-CM

## 2018-12-31 DIAGNOSIS — N39.0 URINARY TRACT INFECTION WITHOUT HEMATURIA, SITE UNSPECIFIED: Primary | ICD-10-CM

## 2018-12-31 DIAGNOSIS — N40.1 BPH WITH URINARY OBSTRUCTION: ICD-10-CM

## 2018-12-31 DIAGNOSIS — R53.81 PHYSICAL DECONDITIONING: ICD-10-CM

## 2018-12-31 DIAGNOSIS — G20 PARKINSON DISEASE (HCC): ICD-10-CM

## 2018-12-31 DIAGNOSIS — I10 ESSENTIAL HYPERTENSION: ICD-10-CM

## 2018-12-31 DIAGNOSIS — A41.9 SEPSIS, DUE TO UNSPECIFIED ORGANISM: ICD-10-CM

## 2018-12-31 LAB
ANION GAP SERPL CALC-SCNC: 7 MMOL/L (ref 0–18)
BUN BLD-MCNC: 17 MG/DL (ref 8–20)
BUN/CREAT SERPL: 27.4 (ref 10–20)
CALCIUM BLD-MCNC: 8.3 MG/DL (ref 8.3–10.3)
CHLORIDE SERPL-SCNC: 101 MMOL/L (ref 101–111)
CO2 SERPL-SCNC: 29 MMOL/L (ref 22–32)
CREAT BLD-MCNC: 0.62 MG/DL (ref 0.7–1.3)
GLUCOSE BLD-MCNC: 101 MG/DL (ref 70–99)
OSMOLALITY SERPL CALC.SUM OF ELEC: 286 MOSM/KG (ref 275–295)
POTASSIUM SERPL-SCNC: 4.1 MMOL/L (ref 3.6–5.1)
SODIUM SERPL-SCNC: 137 MMOL/L (ref 136–144)

## 2018-12-31 PROCEDURE — 99239 HOSP IP/OBS DSCHRG MGMT >30: CPT | Performed by: HOSPITALIST

## 2018-12-31 PROCEDURE — 99306 1ST NF CARE HIGH MDM 50: CPT | Performed by: FAMILY MEDICINE

## 2018-12-31 NOTE — PLAN OF CARE
Assumed care @ 0730. patient denies discomfort. Cuellar catheter draining clear yellow urine. Patient did ambulate in the hallway. Respirations non-labored, lungs sound diminished, O2 sat 96% on room air @ rest, 91 % on room air with ambulation.  Patient afeb

## 2018-12-31 NOTE — PROGRESS NOTES
[facility]                INFECTIOUS DISEASE PROGRESS NOTE    Norah Woodall Patient Status:  Inpatient    1940 MRN [id number]   [facility] 4NW-A Attending Jr Abbott MD   [address] Day # 7 PCP Елена Gautam MD     Antibioti coli  ESBL Pos -  (no method available)     Cefazolin >=64 Resistant      Cefepime  Resistant      Ceftazidime  Resistant      Ceftriaxone >=64 Resistant      Ciprofloxacin >=4 Resistant      Gentamicin <=1 Sensitive      Meropenem <=0.25 Sensitive      Le

## 2018-12-31 NOTE — PHYSICAL THERAPY NOTE
PHYSICAL THERAPY TREATMENT NOTE - INPATIENT    Room Number: 417/417-A     Session: 3  Number of Visits to Meet Established Goals: 5    Presenting Problem: R LE cellulitis, UTI    History related to current admission: Pt is a 66 y.o.  Male admitted 12/23/18 Static Sitting: Fair +  Dynamic Sitting: Fair           Static Standing: Fair -  Dynamic Standing: Fair -    ACTIVITY TOLERANCE                         O2 WALK  SPO and reports feeling \"normal\" . O2 sats 91% on RA. Pt refusing seated rest or ride back to room. Pt amb from stairs to room ~180' c RW and supervision. D/w pt and spouse d/c recs for home c HHPT, recs for RW for EC and stability.  Pt left in chair, needs

## 2018-12-31 NOTE — CM/SW NOTE
SW spoke w/PT/OT regarding pt. PT stating they are going to recommend [facility] since the pt is doing much better. SW follow up w/pt and pt's wife regarding this. Wife stated they would still like the pt to go to [facility] [place].  Wife feels the pt needs a lot of PT t

## 2018-12-31 NOTE — DISCHARGE SUMMARY
IVA HOSPITALIST  DISCHARGE SUMMARY     Jasmin Valenzuela Patient Status:  Inpatient    1940 MRN [id number]   [facility] 4NW-A Attending Angelito Ariza MD   [facility] Day # 7 PCP Whitley Dykes MD     Date of Admission: 2018  Date of 1 diastolic dysfunction which is unchanged from his echo done in October. His oxygen is weaned. His activity was increased. He was continued on his Parkinson's medications and remained stable.   He was discharged today in good condition on IV antibiotics Tartrate 25 MG Tabs  Commonly known as:  LOPRESSOR      Take 1/2 tab 12.5 mg one tab twice daily   Quantity:  45 tablet  Refills:  3     Rasagiline Mesylate 1 MG Tabs      Take 1 mg by mouth daily.    Refills:  0     tamsulosin HCl 0.4 MG Caps  Commonly kno

## 2018-12-31 NOTE — PROGRESS NOTES
IVA HOSPITALIST  Progress Note     Christi Haque Patient Status:  Inpatient    1940 MRN [id number]   [facility] 4NW-A Attending Cindy Ross MD   [facility] Day # 7 PCP Johnson Angelo MD     Chief Complaint: sepsis    S: feels stronge • Alfuzosin HCl ER  10 mg Oral Daily with breakfast   • enoxaparin  40 mg Subcutaneous Daily   • metoprolol Tartrate  12.5 mg Oral 2x Daily(Beta Blocker)   • aspirin  81 mg Oral QOD       ASSESSMENT / PLAN:     1. Sepsis due to UTI  1.  Cx with ESBL, repe

## 2018-12-31 NOTE — CM/SW NOTE
12/31/18 1100   Discharge disposition   Expected discharge disposition Skilled Nurs   Name of Howie Fong Na   Discharge transportation [address] to arrive at 3:30pm. RN, pt's family, and [facility] aware of dc time.

## 2018-12-31 NOTE — CM/SW NOTE
SW received insurance auth from [facility]. Informed [facility] the pt is on IV ABX. Family will most likely transport the pt later today. Updated the pt's MD the pt has insurance auth.

## 2018-12-31 NOTE — PROGRESS NOTES
NURSING DISCHARGE NOTE    Discharged Nursing home via Wheelchair. Accompanied by Support staff  Belongings Taken by patient/family Patient discharged to [facility]. Marycruz Marquez

## 2019-01-01 ENCOUNTER — MOBILE ENCOUNTER (OUTPATIENT)
Dept: FAMILY MEDICINE CLINIC | Facility: CLINIC | Age: 79
End: 2019-01-01

## 2019-01-01 VITALS — SYSTOLIC BLOOD PRESSURE: 122 MMHG | DIASTOLIC BLOOD PRESSURE: 64 MMHG | TEMPERATURE: 99 F | HEART RATE: 73 BPM

## 2019-01-01 DIAGNOSIS — G20 PARKINSON'S DISEASE (HCC): ICD-10-CM

## 2019-01-01 DIAGNOSIS — G47.00 INSOMNIA, UNSPECIFIED TYPE: ICD-10-CM

## 2019-01-01 DIAGNOSIS — F41.9 ANXIETY: ICD-10-CM

## 2019-01-01 DIAGNOSIS — N39.0 URINARY TRACT INFECTION WITHOUT HEMATURIA, SITE UNSPECIFIED: ICD-10-CM

## 2019-01-01 DIAGNOSIS — R53.1 WEAKNESS GENERALIZED: ICD-10-CM

## 2019-01-01 DIAGNOSIS — I10 ESSENTIAL HYPERTENSION: ICD-10-CM

## 2019-01-01 DIAGNOSIS — A41.9 SEPSIS, DUE TO UNSPECIFIED ORGANISM: Primary | ICD-10-CM

## 2019-01-01 PROCEDURE — 99358 PROLONG SERVICE W/O CONTACT: CPT | Performed by: FAMILY MEDICINE

## 2019-01-01 NOTE — PROGRESS NOTES
Cosme Elliott  : 1940  Age 66year old  male      CC--Patient presents with:  Nursing Home: Admitted to [address] [place] [address] for subacute rehab, urinary retention UTI hypertrophic prostate      H. P.I Cosme Elliott is a 66year old male INGUINAL REPAIR ADULT Left 10/24/2016    Performed by Bernardino Hobbs MD at [facility] MAIN OR     Family History   Problem Relation Age of Onset   • Heart Attack Father    • Other (Other) Father    • Other (Other) Mother    • Diabetes Sister    • Hypertension Sis lesions or rashes  WOUNDS: none   EYES:no visual complaints or deficits  HENT: denies nasal congestion, sinus pain or sore throat;  RESPIRATORY: denies shortness of breath, wheezing or cough   CARDIOVASCULAR:denies chest pain, no palpitations   GI: denies deficit, reflexes normal, cranial nerves intact II-XII, follows commands  PSYCHIATRIC: alert and oriented x 3; affect appropriate      DIAGNOSTICS REVIEWED AT THIS VISIT:    ASSESSMENT AND PLAN:  Diagnoses and all orders for this visit:    Urinary tract in

## 2019-01-01 NOTE — PROGRESS NOTES
Patient admitted to [address] [place] [address] [facility] [place] [facility] under my care from 12/31/18  Patient was admitted to [facility] from 12/23/18 to 12/31/18  Patient's record reviewed from the hospital  Consult reports, notes from the hospitalists, imaging and lab

## 2019-01-04 ENCOUNTER — TELEPHONE (OUTPATIENT)
Dept: FAMILY MEDICINE CLINIC | Facility: CLINIC | Age: 79
End: 2019-01-04

## 2019-01-05 ENCOUNTER — SNF VISIT (OUTPATIENT)
Dept: FAMILY MEDICINE CLINIC | Facility: CLINIC | Age: 79
End: 2019-01-05

## 2019-01-05 DIAGNOSIS — E87.5 HYPERKALEMIA: Primary | ICD-10-CM

## 2019-01-05 DIAGNOSIS — N40.1 BENIGN PROSTATIC HYPERPLASIA WITH URINARY OBSTRUCTION: ICD-10-CM

## 2019-01-05 DIAGNOSIS — R53.1 WEAKNESS GENERALIZED: ICD-10-CM

## 2019-01-05 DIAGNOSIS — N39.0 URINARY TRACT INFECTION WITHOUT HEMATURIA, SITE UNSPECIFIED: ICD-10-CM

## 2019-01-05 DIAGNOSIS — N13.8 BENIGN PROSTATIC HYPERPLASIA WITH URINARY OBSTRUCTION: ICD-10-CM

## 2019-01-05 PROCEDURE — 99309 SBSQ NF CARE MODERATE MDM 30: CPT | Performed by: FAMILY MEDICINE

## 2019-01-05 NOTE — PROGRESS NOTES
Sheila Alford  : 1940  Age 66year old  male      CC--No chief complaint on file.       H.P.I Sheila Alford is a 66year old male [facility], he was discharged from [facility] today  Arcenio Acre Parkinson's disease ([facility]    • Parkinsons disease ([facility]    • Pneumonia, organism unspecified(486)    • Prostate asymmetry    • Sleep disturbance    • Visual impairment     reading glasses   • Wears glasses     reading glasses      Past Surgical History: 81 mg by mouth every other day. Disp:  Rfl:    bisacodyl 10 MG Rectal Suppos Place 10 mg rectally daily. Disp:  Rfl:    finasteride (PROSCAR) 5 MG Oral Tab Take 5 mg by mouth nightly.    Disp:  Rfl:        REVIEW OF SYSTEMS:  GENERAL HEALTH:feels well other CVA tenderness and Indwelling Cuellar catheter  LYMPHATIC:no lymphedema  MUSCULOSKELETAL: no acute synovitis upper or lower extremity  EXTREMITIES/VASCULAR:no cyanosis, clubbing 2+ bilateral pedal edema   radial pulses 2+ and dorsalis pedal pulses 2+  NEUROL

## 2019-01-08 ENCOUNTER — SNF VISIT (OUTPATIENT)
Dept: INTERNAL MEDICINE CLINIC | Age: 79
End: 2019-01-08

## 2019-01-08 VITALS
TEMPERATURE: 98 F | SYSTOLIC BLOOD PRESSURE: 140 MMHG | RESPIRATION RATE: 19 BRPM | HEART RATE: 82 BPM | DIASTOLIC BLOOD PRESSURE: 74 MMHG | OXYGEN SATURATION: 97 %

## 2019-01-08 DIAGNOSIS — N40.1 BENIGN PROSTATIC HYPERPLASIA WITH LOWER URINARY TRACT SYMPTOMS, SYMPTOM DETAILS UNSPECIFIED: ICD-10-CM

## 2019-01-08 DIAGNOSIS — I10 ESSENTIAL HYPERTENSION: ICD-10-CM

## 2019-01-08 DIAGNOSIS — R33.8 ACUTE RETENTION OF URINE: ICD-10-CM

## 2019-01-08 DIAGNOSIS — G20 PD (PARKINSON'S DISEASE) (HCC): ICD-10-CM

## 2019-01-08 DIAGNOSIS — A49.9 ESBL (EXTENDED SPECTRUM BETA-LACTAMASE) PRODUCING BACTERIA INFECTION: ICD-10-CM

## 2019-01-08 DIAGNOSIS — A41.9 SEPSIS, DUE TO UNSPECIFIED ORGANISM: Primary | ICD-10-CM

## 2019-01-08 DIAGNOSIS — Z16.12 ESBL (EXTENDED SPECTRUM BETA-LACTAMASE) PRODUCING BACTERIA INFECTION: ICD-10-CM

## 2019-01-08 PROCEDURE — 99309 SBSQ NF CARE MODERATE MDM 30: CPT | Performed by: NURSE PRACTITIONER

## 2019-01-09 NOTE — TELEPHONE ENCOUNTER
Patient surgery has been postponed to 1/16  His aspirin needs to be stopped a week prior  Discussed with the patient's son as well as with Dr. Hernan Cid  His nursing home stay need to be extended until the surgery, his antibiotics needs to be extended until t

## 2019-01-09 NOTE — TELEPHONE ENCOUNTER
Pt's wife called requesting to alvaro toledo/ Julito Dimas pt is having surgery on Fri 1/11/19 they have a few questions before surgery.

## 2019-01-09 NOTE — PROGRESS NOTES
Jasmin Bianca  : 1940  Age 66year old  male patient is admitted to Facility: [facility].     [address] [place] [address] Drive date:  18  Discharge date to [facility]:  18  ELOS:  14 days  An BPH (benign prostatic hyperplasia)    • Constipation    • Deep vein thrombosis (HCC)     left leg a few years ago per pt report   • Depression    • Fatigue    • Frequent urination    • Frequent use of laxatives    • GENITO-URINARY DISEASE    • High cholest Rfl: ALPRAZolam 0.25 MG Oral Tab Take 0.25 mg by mouth nightly as needed. Disp:  Rfl: 1   Rasagiline Mesylate 1 MG Oral Tab Take 1 mg by mouth daily. Disp:  Rfl:    Amantadine HCl 100 MG Oral Cap Take 100 mg by mouth 2 (two) times daily.  Disp:  Rfl: pink, moist, pharynx no exudate, no visible cerumen.   NECK: supple; FROM; no JVD, no TMG, no carotid bruits  BREAST: ---deferred  RESPIRATORY:clear to percussion and auscultation  CARDIOVASCULAR: S1, S2 normal, RRR; no S3, no S4; , no click, no murmur  ABD saturations  -Daily wts, notify MD/NP for >2 lb wt gain overnight and >5 lb wt gain in one week.     PD/Weakness  -PT/OT to evaluate and treat  -Fall precautions  -CG assist with ambulation  -Rasagiline Mesylate 1 mg qd  -Amantadine 100 mg bid  -Carbidopa-L

## 2019-01-12 ENCOUNTER — SNF VISIT (OUTPATIENT)
Dept: FAMILY MEDICINE CLINIC | Facility: CLINIC | Age: 79
End: 2019-01-12

## 2019-01-12 DIAGNOSIS — Z01.818 PREOPERATIVE CLEARANCE: Primary | ICD-10-CM

## 2019-01-12 DIAGNOSIS — G20 PARKINSON'S DISEASE (HCC): ICD-10-CM

## 2019-01-12 DIAGNOSIS — N39.0 URINARY TRACT INFECTION WITHOUT HEMATURIA, SITE UNSPECIFIED: ICD-10-CM

## 2019-01-12 DIAGNOSIS — R53.1 WEAKNESS GENERALIZED: ICD-10-CM

## 2019-01-12 PROCEDURE — 99310 SBSQ NF CARE HIGH MDM 45: CPT | Performed by: FAMILY MEDICINE

## 2019-01-14 ENCOUNTER — SNF DISCHARGE (OUTPATIENT)
Dept: INTERNAL MEDICINE CLINIC | Age: 79
End: 2019-01-14

## 2019-01-14 ENCOUNTER — MED REC SCAN ONLY (OUTPATIENT)
Dept: FAMILY MEDICINE CLINIC | Facility: CLINIC | Age: 79
End: 2019-01-14

## 2019-01-14 DIAGNOSIS — N40.1 BENIGN PROSTATIC HYPERPLASIA WITH LOWER URINARY TRACT SYMPTOMS, SYMPTOM DETAILS UNSPECIFIED: ICD-10-CM

## 2019-01-14 DIAGNOSIS — I10 ESSENTIAL HYPERTENSION: ICD-10-CM

## 2019-01-14 DIAGNOSIS — G20 PD (PARKINSON'S DISEASE) (HCC): ICD-10-CM

## 2019-01-14 DIAGNOSIS — R33.8 ACUTE RETENTION OF URINE: ICD-10-CM

## 2019-01-14 DIAGNOSIS — Z16.12 ESBL (EXTENDED SPECTRUM BETA-LACTAMASE) PRODUCING BACTERIA INFECTION: ICD-10-CM

## 2019-01-14 DIAGNOSIS — A49.9 ESBL (EXTENDED SPECTRUM BETA-LACTAMASE) PRODUCING BACTERIA INFECTION: ICD-10-CM

## 2019-01-14 DIAGNOSIS — A41.9 SEPSIS, DUE TO UNSPECIFIED ORGANISM: Primary | ICD-10-CM

## 2019-01-14 NOTE — PROGRESS NOTES
Casper Erickson is a 66year old male who presents for a pre-operative physical exam.   Casper Erickson is scheduled for a TURP procedure at [place] [address]   on 1/16     HPI related to surgery:      HPI:  67 y/o male with PMHx of PD, DL, eHTN, depression, urina Frequent urination    • Frequent use of laxatives    • GENITO-URINARY DISEASE    • High cholesterol    • Hx musculoskletl dis NEC    • Muscle weakness     from the Parkinsons   • Other and unspecified hyperlipidemia    • Parkinson's disease ([facility]    • Mervat orthopnea, PND or dizziness. Musculoskeletal: No joint pain, stiffness or swelling. GI: No nausea, vomiting or diarrhea. No blood in stools.   Neurologic: No seizures, tremors, weakness or numbness    PHYSICAL EXAM:   There were no vitals taken for this v 39  Epithelial Cells:  None  Bacteria:  Few  Hyaline Casts:  None     Urine Culture:    <10,000 Col/ML Yeast  ALL LAB RESULTS SENT TO UROLOGY AT [facility]    ASSESSMENT AND PLAN:    Jevonjeronimo has no significant history of cardiac or pulmonary conditi

## 2019-01-15 VITALS
DIASTOLIC BLOOD PRESSURE: 58 MMHG | HEART RATE: 82 BPM | RESPIRATION RATE: 18 BRPM | OXYGEN SATURATION: 97 % | SYSTOLIC BLOOD PRESSURE: 114 MMHG | TEMPERATURE: 98 F

## 2019-01-16 NOTE — PROGRESS NOTES
Grayson Conception, 8/30/1940, 66year old, male is being discharged from Facility: [facility] of [address]    Date of Admission:12/31/18    Date of Discharge:1/13/19                                 Admitting Diagnoses:Weakness, assist with ambulation  -Rasagiline Mesylate 1 mg qd  -Amantadine 100 mg bid  -Carbidopa-Leodopa  mg qid  -Discharge date extended to 1/13/19     DL/eHTN  -Metoprolol Tartrate 12.5 mg bid, hold for sbp<100 or hr<60.   -ASA 81 mg qd (now on hold)  -Aml

## 2019-01-28 ENCOUNTER — LAB REQUISITION (OUTPATIENT)
Dept: LAB | Facility: HOSPITAL | Age: 79
End: 2019-01-28
Payer: MEDICARE

## 2019-01-28 DIAGNOSIS — R69 ILLNESS: ICD-10-CM

## 2019-01-28 LAB
ALBUMIN SERPL-MCNC: 2.8 G/DL (ref 3.1–4.5)
ALBUMIN/GLOB SERPL: 0.8 {RATIO} (ref 1–2)
ALP LIVER SERPL-CCNC: 74 U/L (ref 45–117)
ALT SERPL-CCNC: 7 U/L (ref 17–63)
ANION GAP SERPL CALC-SCNC: 7 MMOL/L (ref 0–18)
AST SERPL-CCNC: 15 U/L (ref 15–41)
BASOPHILS # BLD AUTO: 0.08 X10(3) UL (ref 0–0.1)
BASOPHILS NFR BLD AUTO: 1.3 %
BILIRUB SERPL-MCNC: 0.3 MG/DL (ref 0.1–2)
BUN BLD-MCNC: 20 MG/DL (ref 8–20)
BUN/CREAT SERPL: 27 (ref 10–20)
CALCIUM BLD-MCNC: 8.1 MG/DL (ref 8.3–10.3)
CHLORIDE SERPL-SCNC: 106 MMOL/L (ref 101–111)
CO2 SERPL-SCNC: 26 MMOL/L (ref 22–32)
CREAT BLD-MCNC: 0.74 MG/DL (ref 0.7–1.3)
CRP SERPL-MCNC: 2.18 MG/DL (ref ?–1)
EOSINOPHIL # BLD AUTO: 0.21 X10(3) UL (ref 0–0.3)
EOSINOPHIL NFR BLD AUTO: 3.3 %
ERYTHROCYTE [DISTWIDTH] IN BLOOD BY AUTOMATED COUNT: 14 % (ref 11.5–16)
GLOBULIN PLAS-MCNC: 3.7 G/DL (ref 2.8–4.4)
GLUCOSE BLD-MCNC: 104 MG/DL (ref 70–99)
HCT VFR BLD AUTO: 31.6 % (ref 37–53)
HGB BLD-MCNC: 10.2 G/DL (ref 13–17)
IMMATURE GRANULOCYTE COUNT: 0.04 X10(3) UL (ref 0–1)
IMMATURE GRANULOCYTE RATIO %: 0.6 %
LYMPHOCYTES # BLD AUTO: 1.65 X10(3) UL (ref 0.9–4)
LYMPHOCYTES NFR BLD AUTO: 25.9 %
M PROTEIN MFR SERPL ELPH: 6.5 G/DL (ref 6.4–8.2)
MCH RBC QN AUTO: 27.9 PG (ref 27–33.2)
MCHC RBC AUTO-ENTMCNC: 32.3 G/DL (ref 31–37)
MCV RBC AUTO: 86.3 FL (ref 80–99)
MONOCYTES # BLD AUTO: 0.6 X10(3) UL (ref 0.1–1)
MONOCYTES NFR BLD AUTO: 9.4 %
NEUTROPHIL ABS PRELIM: 3.8 X10 (3) UL (ref 1.3–6.7)
NEUTROPHILS # BLD AUTO: 3.8 X10(3) UL (ref 1.3–6.7)
NEUTROPHILS NFR BLD AUTO: 59.5 %
OSMOLALITY SERPL CALC.SUM OF ELEC: 291 MOSM/KG (ref 275–295)
PLATELET # BLD AUTO: 223 10(3)UL (ref 150–450)
POTASSIUM SERPL-SCNC: 4.2 MMOL/L (ref 3.6–5.1)
RBC # BLD AUTO: 3.66 X10(6)UL (ref 3.8–5.8)
RED CELL DISTRIBUTION WIDTH-SD: 44 FL (ref 35.1–46.3)
SED RATE-ML: 52 MM/HR (ref 0–12)
SODIUM SERPL-SCNC: 139 MMOL/L (ref 136–144)
WBC # BLD AUTO: 6.4 X10(3) UL (ref 4–13)

## 2019-01-28 PROCEDURE — 86140 C-REACTIVE PROTEIN: CPT | Performed by: INTERNAL MEDICINE

## 2019-01-28 PROCEDURE — 85652 RBC SED RATE AUTOMATED: CPT | Performed by: INTERNAL MEDICINE

## 2019-01-28 PROCEDURE — 80053 COMPREHEN METABOLIC PANEL: CPT | Performed by: INTERNAL MEDICINE

## 2019-01-28 PROCEDURE — 85025 COMPLETE CBC W/AUTO DIFF WBC: CPT | Performed by: INTERNAL MEDICINE

## 2019-05-01 ENCOUNTER — ORDER TRANSCRIPTION (OUTPATIENT)
Dept: PHYSICAL THERAPY | Facility: HOSPITAL | Age: 79
End: 2019-05-01

## 2019-05-01 DIAGNOSIS — G20 EARLY-ONSET PARKINSON'S DISEASE (HCC): ICD-10-CM

## 2019-05-01 DIAGNOSIS — R53.1 WEAKNESS: Primary | ICD-10-CM

## 2019-05-01 DIAGNOSIS — R13.10 DYSPHAGIA: ICD-10-CM

## 2019-05-14 ENCOUNTER — HOSPITAL ENCOUNTER (OUTPATIENT)
Dept: PHYSICAL THERAPY | Facility: HOSPITAL | Age: 79
Setting detail: THERAPIES SERIES
Discharge: HOME | End: 2019-05-14
Attending: FAMILY MEDICINE
Payer: MEDICARE

## 2019-05-14 DIAGNOSIS — G20 EARLY-ONSET PARKINSON'S DISEASE (HCC): ICD-10-CM

## 2019-05-14 DIAGNOSIS — R53.1 WEAKNESS: ICD-10-CM

## 2019-05-14 DIAGNOSIS — R13.10 DYSPHAGIA: ICD-10-CM

## 2019-05-14 PROCEDURE — 97110 THERAPEUTIC EXERCISES: CPT

## 2019-05-14 PROCEDURE — 97161 PT EVAL LOW COMPLEX 20 MIN: CPT

## 2019-05-14 NOTE — PROGRESS NOTES
NEUROLOGICAL EVALUATION:   Referring Physician: Dr. Ritchie Zepeda  Diagnosis: weakness, early onset Parkinson's disease     Date of Service: 5/14/2019     PATIENT SUMMARY   Trina Pierson is a 66year old y/o male who presents to therapy today with complaints of believe he is a major fall risk at this time and even demonstrated a very good TUG score, however he would benefit from skilled therapy to address his other remaining deficits.   Francy Mendenhall would benefit from skilled Physical Therapy to address the above impai Goals:  Long Term Goals (To be met in 10 visits)    Patient will be independent in HEP and demonstrate correct posture/body mechanics to prevent re-injury.   Patient will demonstrate increased BL hip flexion strength by at least 1/2 a grade in order to im

## 2019-05-15 ENCOUNTER — HOSPITAL ENCOUNTER (OUTPATIENT)
Dept: OCCUPATIONAL MEDICINE | Facility: HOSPITAL | Age: 79
Setting detail: THERAPIES SERIES
Discharge: HOME | End: 2019-05-15
Attending: FAMILY MEDICINE
Payer: MEDICARE

## 2019-05-15 DIAGNOSIS — R53.1 WEAKNESS: ICD-10-CM

## 2019-05-15 DIAGNOSIS — G20 EARLY-ONSET PARKINSON'S DISEASE (HCC): ICD-10-CM

## 2019-05-15 DIAGNOSIS — R13.10 DYSPHAGIA: ICD-10-CM

## 2019-05-15 PROCEDURE — 97166 OT EVAL MOD COMPLEX 45 MIN: CPT

## 2019-05-15 PROCEDURE — 97110 THERAPEUTIC EXERCISES: CPT

## 2019-05-15 NOTE — PROGRESS NOTES
OCCUPATIONAL THERAPY NEURO EVALUATION:.   Referring Physician: Dr. Kosta Camp  Diagnosis: Early onset of Parkinson's weakness, dysphasia  Date of Service: 5/15/2019     PATIENT SUMMARY   Herbert Diane is a 66year old y/o male who presents to therapy today w examine ability to effectively participate in daily home and community activities.   Impaired UE strength, coordination, proprioception, motor control, tremors, endurance, and use of adaptive techniques are identified as impairments which challenge ability func'l reaching. Pt had positive response with use of 1/2# wrist weight during reaching. Also, discussed stabilization techniques that can help with tremors. Pt able to complete func'l reaching activities integrating these strategies.   No additional que therapist: Yuli Wallace OT  [de-identified] certification required: Yes  I certify the need for these services furnished under this plan of treatment and while under my care.     X___________________________________________________ Date____________________

## 2019-05-17 ENCOUNTER — HOSPITAL ENCOUNTER (OUTPATIENT)
Dept: SPEECH THERAPY | Facility: HOSPITAL | Age: 79
Setting detail: THERAPIES SERIES
Discharge: HOME | End: 2019-05-17
Attending: FAMILY MEDICINE
Payer: MEDICARE

## 2019-05-17 ENCOUNTER — HOSPITAL ENCOUNTER (OUTPATIENT)
Dept: PHYSICAL THERAPY | Facility: HOSPITAL | Age: 79
Setting detail: THERAPIES SERIES
Discharge: HOME | End: 2019-05-17
Attending: FAMILY MEDICINE
Payer: MEDICARE

## 2019-05-17 DIAGNOSIS — G20 EARLY-ONSET PARKINSON'S DISEASE (HCC): ICD-10-CM

## 2019-05-17 DIAGNOSIS — R53.1 WEAKNESS: ICD-10-CM

## 2019-05-17 DIAGNOSIS — R13.10 DYSPHAGIA: ICD-10-CM

## 2019-05-17 PROCEDURE — 97112 NEUROMUSCULAR REEDUCATION: CPT

## 2019-05-17 PROCEDURE — 97110 THERAPEUTIC EXERCISES: CPT

## 2019-05-17 PROCEDURE — 92522 EVALUATE SPEECH PRODUCTION: CPT

## 2019-05-17 NOTE — PROGRESS NOTES
Dx: weakness, early onset Parkinson's disease         Authorized # of Visits:  No limit         Next MD visit: none scheduled  Fall Risk: standard         Precautions: n/a             Subjective: Patient reports that he has been having some increased pain

## 2019-05-17 NOTE — PROGRESS NOTES
ADULT SPEECH/LANGUAGE EVALUATION  Referring Physician: Dr. Joel Mead  Diagnosis: Weakness (R53.1); Dysphagia (R13.10); Early-onset Parkinson's disease ([address])  Date of Service: 5/17/2019   Speech evaluation completed per MD order.  Patient arrived to session on patient. Significant findings related to speech-language/swallowing include: PD, past history of pneumonia.   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   Patient's deficits adversely impact his ability to communicate effectively with others across a strategies for increased intelligibility and improved prosody at conversational level across functional living environments (6 months).     Short Term:   STG 1: Patient will independently verbalize understanding of motor-speech strategies across 2-3 session me at Dept: [phone].     Sincerely,  Electronically signed by therapist: Nery Barbosa  Physician's certification required: Yes  I certify the need for these services furnished under this plan of treatment and while u

## 2019-05-21 ENCOUNTER — HOSPITAL ENCOUNTER (OUTPATIENT)
Dept: PHYSICAL THERAPY | Facility: HOSPITAL | Age: 79
Setting detail: THERAPIES SERIES
Discharge: HOME | End: 2019-05-21
Attending: FAMILY MEDICINE
Payer: MEDICARE

## 2019-05-21 PROCEDURE — 97112 NEUROMUSCULAR REEDUCATION: CPT

## 2019-05-21 PROCEDURE — 97110 THERAPEUTIC EXERCISES: CPT

## 2019-05-21 NOTE — PROGRESS NOTES
Dx: weakness, early onset Parkinson's disease         Authorized # of Visits:  No limit         Next MD visit: none scheduled  Fall Risk: standard         Precautions: n/a             Subjective: Patient reports he has been very diligent with his exercises isometric hip flexion 2 sec holds 2x10 BL         Alt toe taps on 3 cone stack 2x1'         Small squats 2x10        Skilled Services: HEP in bold.     Charges: therex x2, neuro x1       Total Timed Treatment: 45 min  Total Treatment Time: 45 min

## 2019-05-24 ENCOUNTER — HOSPITAL ENCOUNTER (OUTPATIENT)
Dept: SPEECH THERAPY | Facility: HOSPITAL | Age: 79
Setting detail: THERAPIES SERIES
Discharge: HOME | End: 2019-05-24
Attending: FAMILY MEDICINE
Payer: MEDICARE

## 2019-05-24 ENCOUNTER — HOSPITAL ENCOUNTER (OUTPATIENT)
Dept: PHYSICAL THERAPY | Facility: HOSPITAL | Age: 79
Setting detail: THERAPIES SERIES
Discharge: HOME | End: 2019-05-24
Attending: FAMILY MEDICINE
Payer: MEDICARE

## 2019-05-24 PROCEDURE — 97112 NEUROMUSCULAR REEDUCATION: CPT

## 2019-05-24 PROCEDURE — 92507 TX SP LANG VOICE COMM INDIV: CPT

## 2019-05-24 PROCEDURE — 97110 THERAPEUTIC EXERCISES: CPT

## 2019-05-24 NOTE — PROGRESS NOTES
Dx: weakness, early onset Parkinson's disease         Authorized # of Visits:  No limit         Next MD visit: none scheduled  Fall Risk: standard         Precautions: n/a             Subjective: Patient reports he feels much better when he does his exerci stand 2x10       airex marching 2x1' airex marching 2x1' Airex marching 2x1'       biodex limits of stability regular 2:13, 1:14  Random control testing (big and slow 66%, 63%)  Biodex limits of stability regular 2:15, 1:35  Random control testing (big and

## 2019-05-24 NOTE — PROGRESS NOTES
Treatment #2 (Medicare 2/10; PN thru 8/15/19)  Treatment Time: 60 minutes  Precautions: fall risk     Charges: 1 billed (98478) or   Pain: 0/10      Diagnosis: Weakness (R53.1); Dysphagia (R13.10);  Early-onset Parkinson's disease (G20)                 Subj produce an average SPL of 70 dB with 80% intelligibility during conversation given min verbal/visual cues to increase loudness for improved speech intelligibility (3 months). Not addressed due to focus on other goals.     Assessment: Patient presents with

## 2019-05-28 ENCOUNTER — HOSPITAL ENCOUNTER (OUTPATIENT)
Dept: PHYSICAL THERAPY | Facility: HOSPITAL | Age: 79
Setting detail: THERAPIES SERIES
Discharge: HOME | End: 2019-05-28
Attending: FAMILY MEDICINE
Payer: MEDICARE

## 2019-05-28 PROCEDURE — 97110 THERAPEUTIC EXERCISES: CPT

## 2019-05-28 PROCEDURE — 97530 THERAPEUTIC ACTIVITIES: CPT

## 2019-05-28 NOTE — PROGRESS NOTES
Dx: weakness, early onset Parkinson's disease         Authorized # of Visits:  No limit         Next MD visit: none scheduled  Fall Risk: standard         Precautions: n/a             Subjective: Patient reports he is frustrated that he has such difficulty stand 2x10 Sit to stand 2x10      airex marching 2x1' airex marching 2x1' Airex marching 2x1' Airex marching 2x1'      biodex limits of stability regular 2:13, 1:14  Random control testing (big and slow 66%, 63%)  Biodex limits of stability regular 2:15, 1

## 2019-05-29 ENCOUNTER — HOSPITAL ENCOUNTER (OUTPATIENT)
Dept: OCCUPATIONAL MEDICINE | Facility: HOSPITAL | Age: 79
Setting detail: THERAPIES SERIES
Discharge: HOME | End: 2019-05-29
Attending: FAMILY MEDICINE
Payer: MEDICARE

## 2019-05-29 PROCEDURE — 97110 THERAPEUTIC EXERCISES: CPT

## 2019-05-29 PROCEDURE — 97535 SELF CARE MNGMENT TRAINING: CPT

## 2019-05-29 NOTE — PROGRESS NOTES
Dx: weakness, dysphasia, early onset Parkinson's Disease    Authorized # of Visits: no limit         Next MD visit: none scheduled  Fall Risk: standard         Precautions: n/a             Subjective: Pt stated, \"I am trying to do the things you told me t hole peg test with L hand by 3 seconds for IADL completion. Pt will increase  strength by 3# for opening containers. Pt will complete simulated car t/f with 1 cue for adaptive strategies.   Long Term Goals:  Pt will complete UE HEPs without reminders about additional strategies that can be utilized for reducing tremors and how they can be incorporated at home. Pt in agreement.       Charges: 3 ther ex, 1 self-care       Total Timed Treatment: 60 min  Total Treatment Time: 60 min

## 2019-05-31 ENCOUNTER — HOSPITAL ENCOUNTER (OUTPATIENT)
Dept: SPEECH THERAPY | Facility: HOSPITAL | Age: 79
Setting detail: THERAPIES SERIES
Discharge: HOME | End: 2019-05-31
Attending: FAMILY MEDICINE
Payer: MEDICARE

## 2019-05-31 PROCEDURE — 92507 TX SP LANG VOICE COMM INDIV: CPT

## 2019-05-31 NOTE — PROGRESS NOTES
Treatment #3 (Medicare 3/10; PN thru 8/15/19)  Treatment Time: 60 minutes  Precautions: fall risk     Charges: 1 billed ([id number]) or   Pain: 0/10      Diagnosis: Weakness (R53.1); Dysphagia (R13.10);  Early-onset Parkinson's disease (G20)                 Subj intelligibility (3 months). Patient produced an average SPL of 72 dB during oral reading task of medial /p/ sentences.     STG 6: Patient will produce an average SPL of 70 dB with 80% intelligibility during conversation given min verbal/visual cues to incr

## 2019-06-04 ENCOUNTER — HOSPITAL ENCOUNTER (OUTPATIENT)
Dept: PHYSICAL THERAPY | Facility: HOSPITAL | Age: 79
Setting detail: THERAPIES SERIES
Discharge: HOME | End: 2019-06-04
Attending: FAMILY MEDICINE
Payer: MEDICARE

## 2019-06-04 PROCEDURE — 97110 THERAPEUTIC EXERCISES: CPT

## 2019-06-04 PROCEDURE — 97112 NEUROMUSCULAR REEDUCATION: CPT

## 2019-06-04 NOTE — PROGRESS NOTES
Dx: weakness, early onset Parkinson's disease         Authorized # of Visits:  No limit         Next MD visit: none scheduled  Fall Risk: standard         Precautions: n/a             Subjective: Patient reports nothing new today.     Objective:   R tandem to stand 2x10 Sit to stand 2x10 Sit to stand 2x10 Sit to stand 2x10     airex marching 2x1' airex marching 2x1' Airex marching 2x1' Airex marching 2x1' Airex marching 2x1'     biodex limits of stability regular 2:13, 1:14  Random control testing (big and s

## 2019-06-05 ENCOUNTER — HOSPITAL ENCOUNTER (OUTPATIENT)
Dept: OCCUPATIONAL MEDICINE | Facility: HOSPITAL | Age: 79
Setting detail: THERAPIES SERIES
Discharge: HOME | End: 2019-06-05
Attending: FAMILY MEDICINE
Payer: MEDICARE

## 2019-06-05 PROCEDURE — 97110 THERAPEUTIC EXERCISES: CPT

## 2019-06-05 PROCEDURE — 97535 SELF CARE MNGMENT TRAINING: CPT

## 2019-06-05 NOTE — PROGRESS NOTES
Dx: weakness, dysphasia, early onset Parkinson's Disease    Authorized # of Visits: no limit         Next MD visit: none scheduled  Fall Risk: standard         Precautions: n/a             Subjective: Pt stated, \"I.\"    Objective:   COGNITION: Continue t completion. Pt will increase  strength by 3# for opening containers. Pt will complete simulated car t/f with 1 cue for adaptive strategies. Long Term Goals:  Pt will complete UE HEPs without reminders for pacing or technique.   Pt will improve coord

## 2019-06-07 ENCOUNTER — HOSPITAL ENCOUNTER (OUTPATIENT)
Dept: SPEECH THERAPY | Facility: HOSPITAL | Age: 79
Setting detail: THERAPIES SERIES
Discharge: HOME | End: 2019-06-07
Attending: FAMILY MEDICINE
Payer: MEDICARE

## 2019-06-07 ENCOUNTER — HOSPITAL ENCOUNTER (OUTPATIENT)
Dept: PHYSICAL THERAPY | Facility: HOSPITAL | Age: 79
Setting detail: THERAPIES SERIES
Discharge: HOME | End: 2019-06-07
Attending: FAMILY MEDICINE
Payer: MEDICARE

## 2019-06-07 PROCEDURE — 97110 THERAPEUTIC EXERCISES: CPT

## 2019-06-07 PROCEDURE — 97112 NEUROMUSCULAR REEDUCATION: CPT

## 2019-06-07 PROCEDURE — 92507 TX SP LANG VOICE COMM INDIV: CPT

## 2019-06-07 NOTE — PROGRESS NOTES
Dx: weakness, early onset Parkinson's disease         Authorized # of Visits:  No limit         Next MD visit: none scheduled  Fall Risk: standard         Precautions: n/a             Subjective: Patient states that his L hip has been bothering him the pas stance x3' total Static balance ex: tandem stance x4' total Static balance ex: tandem stance x4' total Continued at home Continued at home Continued at home    Seated piriformis stretch 2x30 sec BL  Seated hamstring stretch 2x30 sec BL Seated piriformis st Treatment Time: 45 min

## 2019-06-07 NOTE — PROGRESS NOTES
Treatment #4 (Medicare 4/10; PN thru 8/15/19)  Treatment Time: 60 minutes  Precautions: fall risk     Charges: 1 billed ([id number]) or   Pain: 0/10      Diagnosis: Weakness (R53.1); Dysphagia (R13.10);  Early-onset Parkinson's disease (G20)                 Subj dB with 90% intelligibility during conversation given min verbal cues to over-articulate and slow rate.     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

## 2019-06-12 ENCOUNTER — HOSPITAL ENCOUNTER (OUTPATIENT)
Dept: OCCUPATIONAL MEDICINE | Facility: HOSPITAL | Age: 79
Setting detail: THERAPIES SERIES
Discharge: HOME | End: 2019-06-12
Attending: FAMILY MEDICINE
Payer: MEDICARE

## 2019-06-12 PROCEDURE — 97110 THERAPEUTIC EXERCISES: CPT

## 2019-06-12 PROCEDURE — 97530 THERAPEUTIC ACTIVITIES: CPT

## 2019-06-13 ENCOUNTER — HOSPITAL ENCOUNTER (EMERGENCY)
Facility: HOSPITAL | Age: 79
Discharge: HOME | End: 2019-06-13
Attending: EMERGENCY MEDICINE
Payer: MEDICARE

## 2019-06-13 VITALS
TEMPERATURE: 99 F | BODY MASS INDEX: 21.62 KG/M2 | DIASTOLIC BLOOD PRESSURE: 64 MMHG | HEIGHT: 69 IN | OXYGEN SATURATION: 100 % | WEIGHT: 146 LBS | HEART RATE: 60 BPM | SYSTOLIC BLOOD PRESSURE: 124 MMHG | RESPIRATION RATE: 16 BRPM

## 2019-06-13 DIAGNOSIS — I83.899 BLEEDING FROM VARICOSE VEIN: Primary | ICD-10-CM

## 2019-06-13 PROCEDURE — 99282 EMERGENCY DEPT VISIT SF MDM: CPT

## 2019-06-13 NOTE — ED PROVIDER NOTES
Patient Seen in: [facility] Emergency Department    History   Patient presents with:  Bleeding (hematologic)    Stated Complaint: bleeding from foot varicose vein    HPI    42-year-old male comes to the hospital the complaint of having had some blee noted above.     Physical Exam     ED Triage Vitals [06/13/19 1129]   /64   Pulse 60   Resp 16   Temp 98.8 °F (37.1 °C)   Temp src Temporal   SpO2 100 %   O2 Device None (Room air)       Current:/64   Pulse 60   Temp 98.8 °F (37.1 °C) (Temporal)

## 2019-06-14 ENCOUNTER — APPOINTMENT (OUTPATIENT)
Dept: SPEECH THERAPY | Facility: HOSPITAL | Age: 79
End: 2019-06-14
Attending: FAMILY MEDICINE
Payer: MEDICARE

## 2019-06-19 ENCOUNTER — APPOINTMENT (OUTPATIENT)
Dept: SPEECH THERAPY | Facility: HOSPITAL | Age: 79
End: 2019-06-19
Attending: FAMILY MEDICINE
Payer: MEDICARE

## 2019-06-21 ENCOUNTER — APPOINTMENT (OUTPATIENT)
Dept: SPEECH THERAPY | Facility: HOSPITAL | Age: 79
End: 2019-06-21
Attending: FAMILY MEDICINE
Payer: MEDICARE

## 2019-06-26 ENCOUNTER — HOSPITAL ENCOUNTER (OUTPATIENT)
Dept: OCCUPATIONAL MEDICINE | Facility: HOSPITAL | Age: 79
Setting detail: THERAPIES SERIES
Discharge: HOME | End: 2019-06-26
Attending: FAMILY MEDICINE
Payer: MEDICARE

## 2019-06-26 PROCEDURE — 97110 THERAPEUTIC EXERCISES: CPT

## 2019-06-26 NOTE — PROGRESS NOTES
Dx: weakness, dysphasia, early onset Parkinson's Disease    Authorized # of Visits: no limit         Next MD visit: none scheduled  Fall Risk: standard         Precautions: n/a             Subjective: Pt stated, \"I am having some L hip discomfort. \"    Ob hand by 3 seconds for IADL completion. Pt will increase  strength by 3# for opening containers. Pt will complete simulated car t/f with 1 cue for adaptive strategies.   Long Term Goals:  Pt will complete UE HEPs without reminders for pacing or techni min

## 2019-07-02 ENCOUNTER — HOSPITAL ENCOUNTER (OUTPATIENT)
Dept: PHYSICAL THERAPY | Facility: HOSPITAL | Age: 79
Setting detail: THERAPIES SERIES
Discharge: HOME | End: 2019-07-02
Attending: FAMILY MEDICINE
Payer: MEDICARE

## 2019-07-02 PROCEDURE — 97110 THERAPEUTIC EXERCISES: CPT

## 2019-07-02 PROCEDURE — 97112 NEUROMUSCULAR REEDUCATION: CPT

## 2019-07-02 NOTE — PROGRESS NOTES
Dx: weakness, dysphasia, early onset Parkinson's Disease    Authorized # of Visits: no limit         Next MD visit: none scheduled  Fall Risk: standard         Precautions: n/a             Subjective: Pt stated, \"I do not feel that strong. \"    Objective: 3 seconds for IADL completion. Pt will increase  strength by 3# for opening containers. Pt will complete simulated car t/f with 1 cue for adaptive strategies. Long Term Goals:  Pt will complete UE HEPs without reminders for pacing or technique.   Pt Drops noted.       Charges: 2 ther ex, 2 tas     Total Timed Treatment: 60 min  Total Treatment Time: 60 min

## 2019-07-02 NOTE — PROGRESS NOTES
Dx: weakness, early onset Parkinson's disease         Authorized # of Visits:  No limit         Next MD visit: none scheduled  Fall Risk: standard         Precautions: n/a             Subjective: Patient states that he has not been very good about doing hi stance x4' total Static balance ex: tandem stance x4' total Continued at home Continued at home Continued at home Continued at home   Seated piriformis stretch 2x30 sec BL  Seated hamstring stretch 2x30 sec BL Seated piriformis stretch 2x30 sec BL  Seated 8\" lateral step ups x15 BL 8\" lateral step ups x15 BL        Perturbations in narrow base of support x3' total Perturbations in narrow base of support x3' total        Narrow base of support ball throw and catch x4' total Narrow base of support, semi-t

## 2019-07-09 ENCOUNTER — HOSPITAL ENCOUNTER (OUTPATIENT)
Dept: PHYSICAL THERAPY | Facility: HOSPITAL | Age: 79
Setting detail: THERAPIES SERIES
Discharge: HOME | End: 2019-07-09
Attending: FAMILY MEDICINE
Payer: MEDICARE

## 2019-07-09 PROCEDURE — 97110 THERAPEUTIC EXERCISES: CPT

## 2019-07-09 PROCEDURE — 97112 NEUROMUSCULAR REEDUCATION: CPT

## 2019-07-09 NOTE — PROGRESS NOTES
Dx: weakness, early onset Parkinson's disease         Authorized # of Visits:  No limit         Next MD visit: none scheduled  Fall Risk: standard         Precautions: n/a             Subjective: Patient states nothing new today, but has been performing hi stance x3' total Static balance ex: tandem stance x4' total Static balance ex: tandem stance x4' total Continued at home Continued at home Continued at home Continued at home Static balance ex: tandem stance x4' total   Seated piriformis stretch 2x30 sec B railing  x15' total    8\" lateral step up with hip hike x20 BL  Alt toe taps on 12\" box x2'      Red tband shoulder strengthening flexion and abduction 3x10 each BL Red tband shoulder strengthening flexion and abduction 3x10 each BL Continued at home Con

## 2019-07-10 ENCOUNTER — TELEPHONE (OUTPATIENT)
Dept: PHYSICAL THERAPY | Facility: HOSPITAL | Age: 79
End: 2019-07-10

## 2019-07-11 ENCOUNTER — HOSPITAL ENCOUNTER (OUTPATIENT)
Dept: OCCUPATIONAL MEDICINE | Facility: HOSPITAL | Age: 79
Setting detail: THERAPIES SERIES
Discharge: HOME | End: 2019-07-11
Attending: FAMILY MEDICINE
Payer: MEDICARE

## 2019-07-11 ENCOUNTER — HOSPITAL ENCOUNTER (OUTPATIENT)
Dept: PHYSICAL THERAPY | Facility: HOSPITAL | Age: 79
Setting detail: THERAPIES SERIES
Discharge: HOME | End: 2019-07-11
Attending: FAMILY MEDICINE
Payer: MEDICARE

## 2019-07-11 PROCEDURE — 97110 THERAPEUTIC EXERCISES: CPT

## 2019-07-11 PROCEDURE — 97112 NEUROMUSCULAR REEDUCATION: CPT

## 2019-07-11 NOTE — PROGRESS NOTES
Dx: weakness, early onset Parkinson's disease         Authorized # of Visits:  No limit         Next MD visit: none scheduled  Fall Risk: standard         Precautions: n/a             Subjective: Patient reports that his L hip is still bothering him.     Ob Standing hip abduction x25 BL Continued at home Standing hip abduction x20 BL Standing hip abduction x20 BL Standing hip abduction x25 BL Standing hip abduction x25 BL Standing hip abduction x25 BL   Static balance ex: tandem stance x3' total Static balanc 2x10 Small squats 2x10    Dynamic balance ex: high stepping, tandem walking, sidestepping, backwards walking x10' total Dynamic balance ex: high stepping, lateral stepping, tandem walking x10' total Dynamic balance ex: heel walking, toe talking, high vane

## 2019-07-11 NOTE — PROGRESS NOTES
Dx: weakness, dysphasia, early onset Parkinson's Disease    Authorized # of Visits: no limit         Next MD visit: none scheduled  Fall Risk: standard         Precautions: n/a             Subjective: Pt stated, \"My energy is low. \"    Objective:   COGNIT completion. Pt will increase  strength by 3# for opening containers. Pt will complete simulated car t/f with 1 cue for adaptive strategies. Long Term Goals:  Pt will complete UE HEPs without reminders for pacing or technique.   Pt will improve coord pt on possibility of use of reacher to help with LE dressing. Demonstration provided. Pt voiced understanding, but declined returning demonstration.   Next pt able to complete fine motor control activity where he needed to modulate the amount of force waldemar

## 2019-09-12 ENCOUNTER — APPOINTMENT (OUTPATIENT)
Dept: GENERAL RADIOLOGY | Age: 79
End: 2019-09-12
Payer: MEDICARE

## 2019-09-12 ENCOUNTER — HOSPITAL ENCOUNTER (EMERGENCY)
Age: 79
Discharge: HOME | End: 2019-09-12
Attending: EMERGENCY MEDICINE
Payer: MEDICARE

## 2019-09-12 VITALS
WEIGHT: 146 LBS | RESPIRATION RATE: 18 BRPM | HEIGHT: 69 IN | TEMPERATURE: 98 F | DIASTOLIC BLOOD PRESSURE: 64 MMHG | SYSTOLIC BLOOD PRESSURE: 130 MMHG | HEART RATE: 70 BPM | OXYGEN SATURATION: 100 % | BODY MASS INDEX: 21.62 KG/M2

## 2019-09-12 DIAGNOSIS — R05.8 PRODUCTIVE COUGH: Primary | ICD-10-CM

## 2019-09-12 LAB
BASOPHILS # BLD AUTO: 0.05 X10(3) UL (ref 0–0.2)
BASOPHILS NFR BLD AUTO: 0.6 %
DEPRECATED RDW RBC AUTO: 45.9 FL (ref 35.1–46.3)
EOSINOPHIL # BLD AUTO: 0.08 X10(3) UL (ref 0–0.7)
EOSINOPHIL NFR BLD AUTO: 1 %
ERYTHROCYTE [DISTWIDTH] IN BLOOD BY AUTOMATED COUNT: 14.2 % (ref 11–15)
HCT VFR BLD AUTO: 38.2 % (ref 39–53)
HGB BLD-MCNC: 12.2 G/DL (ref 13–17.5)
IMM GRANULOCYTES # BLD AUTO: 0.04 X10(3) UL (ref 0–1)
IMM GRANULOCYTES NFR BLD: 0.5 %
LYMPHOCYTES # BLD AUTO: 1.7 X10(3) UL (ref 1–4)
LYMPHOCYTES NFR BLD AUTO: 20.5 %
MCH RBC QN AUTO: 28.3 PG (ref 26–34)
MCHC RBC AUTO-ENTMCNC: 31.9 G/DL (ref 31–37)
MCV RBC AUTO: 88.6 FL (ref 80–100)
MONOCYTES # BLD AUTO: 0.78 X10(3) UL (ref 0.1–1)
MONOCYTES NFR BLD AUTO: 9.4 %
NEUTROPHILS # BLD AUTO: 5.66 X10 (3) UL (ref 1.5–7.7)
NEUTROPHILS # BLD AUTO: 5.66 X10(3) UL (ref 1.5–7.7)
NEUTROPHILS NFR BLD AUTO: 68 %
PLATELET # BLD AUTO: 183 10(3)UL (ref 150–450)
RBC # BLD AUTO: 4.31 X10(6)UL (ref 3.8–5.8)
WBC # BLD AUTO: 8.3 X10(3) UL (ref 4–11)

## 2019-09-12 PROCEDURE — 99284 EMERGENCY DEPT VISIT MOD MDM: CPT

## 2019-09-12 PROCEDURE — 94640 AIRWAY INHALATION TREATMENT: CPT

## 2019-09-12 PROCEDURE — 71046 X-RAY EXAM CHEST 2 VIEWS: CPT

## 2019-09-12 PROCEDURE — 85025 COMPLETE CBC W/AUTO DIFF WBC: CPT | Performed by: PHYSICIAN ASSISTANT

## 2019-09-12 PROCEDURE — 36415 COLL VENOUS BLD VENIPUNCTURE: CPT

## 2019-09-12 RX ORDER — METHYLPREDNISOLONE 4 MG/1
TABLET ORAL
Qty: 1 PACKAGE | Refills: 0 | Status: ON HOLD | OUTPATIENT
Start: 2019-09-12 | End: 2021-03-09

## 2019-09-12 RX ORDER — IPRATROPIUM BROMIDE AND ALBUTEROL SULFATE 2.5; .5 MG/3ML; MG/3ML
3 SOLUTION RESPIRATORY (INHALATION) ONCE
Status: COMPLETED | OUTPATIENT
Start: 2019-09-12 | End: 2019-09-12

## 2019-09-12 RX ORDER — PREDNISONE 20 MG/1
40 TABLET ORAL ONCE
Status: COMPLETED | OUTPATIENT
Start: 2019-09-12 | End: 2019-09-12

## 2019-09-12 RX ORDER — ALBUTEROL SULFATE 90 UG/1
2 AEROSOL, METERED RESPIRATORY (INHALATION) EVERY 4 HOURS PRN
Qty: 1 INHALER | Refills: 0 | Status: SHIPPED | OUTPATIENT
Start: 2019-09-12 | End: 2019-10-12

## 2019-09-12 NOTE — ED PROVIDER NOTES
Patient Seen in: [facility] Emergency Department [place]    History   Patient presents with:  Cough/URI    Stated Complaint: worsening cough    HPI    Patient is a 70-year-old male. He has been sick with URI type symptoms for the past 5 days.   He was systems are as noted in HPI. Constitutional and vital signs reviewed. All other systems reviewed and negative except as noted above.     Physical Exam     ED Triage Vitals [09/12/19 1050]   /64   Pulse 70   Resp 18   Temp 98.1 °F (36.7 °C)   Tem individual orders. Xr Chest Pa + Lat Chest (cpt=71046)    Result Date: 9/12/2019  PROCEDURE:  XR CHEST PA + LAT CHEST (CPT=71046)  INDICATIONS:  worsening cough  COMPARISON:  [facility], XR CHEST PA + LAT CHEST (ASE=97071), 4/23/2018, 11:55.   LAUREL antibiotic. Disposition and Plan     Clinical Impression:  Productive cough  (primary encounter diagnosis)    Disposition:  There is no disposition on file for this visit. There is no disposition time on file for this visit.     Follow-up:  Carlos Aguirre

## 2019-09-12 NOTE — ED INITIAL ASSESSMENT (HPI)
Has been coughing since Sunday. Went to see his PCP on Monday. Had XRAY and blood test.   Started on Z-shraddha. States he still coughing with post nasal drip.

## 2019-10-03 NOTE — TELEPHONE ENCOUNTER
Patient Name: Marnie Ziegler  Procedure Date: 12/5/2017 12:14 PM  MRN: [id number]  Account Number: [id number]  YOB: 1966  Admit Type: Outpatient  Age: 51  Gender: Male  Attending MD: Stacia Quintero MD  Grafts or Implants: None  Blood Administered: None  Procedure:            Colonoscopy  Indications:          Screening for colorectal malignant neoplasm, This is                        the patient's first colonoscopy  Providers:            Stacia Quintero MD  Attending Participation:       I personally performed the entire procedure.  Sedation:             See the Anesthesia note for documentation of the                        administered medications  Procedure:       Pre-Anesthesia Assessment:       - The anesthesia plan was to use monitored anesthesia care (MAC).       A History and Physical was performed prior to the procedure. Patient       medications and allergies were reviewed. Informed consent was obtained       from the patient including discussion of the risks, benefits,       alternatives to the procedure (including the potentially life       threatening risk of bleeding, perforation, missed lesions and sedation).       Questions were answered. A time out was completed. Patient       identification and proposed procedure were verified. The patient was       deemed in satisfactory condition to undergo the procedure. The heart       rate, respiratory rate, oxygen saturations, blood pressure and response       to sedation were monitored throughout the procedure.       The endoscope was passed under direct vision. The Colonoscope was       introduced through the anus and advanced to the terminal ileum, with       identification of the appendiceal orifice and IC valve. The scope was       withdrawn and retroflexion was performed in the rectum. The physical       status of the patient was re-assessed after the procedure. The       colonoscopy was performed without difficulty. The patient  Vern (son) of pt would like to talk to Dr. Denver San. Pt is having surgery on 1/11/2019.     Vern said, \"it's importants that he talks to Dr. Gertrudis Guerrero" within a day or two.    [phone] tolerated the       procedure well. The quality of the bowel preparation was good. The       quality of the bowel preparation was evaluated using the BBPS (Warren       Bowel Preparation Scale) with scores of: Right Colon = 2 (minor amount       of residual staining, small fragments of stool and/or opaque liquid, but       mucosa seen well), Transverse Colon = 2 (minor amount of residual       staining, small fragments of stool and/or opaque liquid, but mucosa seen       well) and Left Colon = 2 (minor amount of residual staining, small       fragments of stool and/or opaque liquid, but mucosa seen well). The       total BBPS score equals 6. The quality of the bowel preparation was good.  Scope Withdrawal Time 0 hours 22 minutes 46 seconds  Scope In: 12:21:35 PM  Scope Out: 12:47:31 PM  Findings:       The perianal and digital rectal examinations were normal.       The terminal ileum appeared normal.       Two sessile polyps were found in the ascending colon. The polyps were 3       to 4 mm in size. These polyps were removed with a jumbo cold forceps.       Resection and retrieval were complete. Verification of patient       identification for the specimen was done.       A 3 mm polyp was found in the transverse colon. The polyp was sessile.       The polyp was removed with a jumbo cold forceps. Resection and retrieval       were complete. Verification of patient identification for the specimen       was done.       A 4 mm polyp was found in the sigmoid colon. The polyp was sessile. The       polyp was removed with a jumbo cold forceps. Resection and retrieval       were complete. Verification of patient identification for the specimen       was done.       Three sessile polyps were found in the rectum. The polyps were 2 to 3 mm       in size. These polyps were removed with a jumbo cold forceps. Resection       and retrieval were complete. Verification of patient identification for       the specimen was done.        Internal hemorrhoids were found during retroflexion. The hemorrhoids       were medium-sized.  Impression:       - The examined portion of the ileum was normal.       - Two 3 to 4 mm polyps in the ascending colon, removed with a jumbo cold       forceps. Resected and retrieved.       - One 3 mm polyp in the transverse colon, removed with a jumbo cold       forceps. Resected and retrieved.       - One 4 mm polyp in the sigmoid colon, removed with a jumbo cold       forceps. Resected and retrieved.       - Three 2 to 3 mm polyps in the rectum, removed with a jumbo cold       forceps. Resected and retrieved.       - Internal hemorrhoids.  Recommendation:       - Await pathology results.       - Return to primary care physician as previously scheduled.       - Repeat colonoscopy in 3 - 5 years for surveillance based on pathology       results.       - Patient has a contact number available for emergencies. The signs and       symptoms of potential delayed complications were discussed with the       patient. Return to normal activities tomorrow. Written discharge       instructions were provided to the patient.  Complications:        No immediate complications.  Estimated Blood Loss: Estimated blood loss: none.  MD Stacia Renee MD  12/5/2017 12:50:52 PM  This report has been signed electronically by the above.  Number of Addenda: 0       [address] [place] [address]

## 2020-01-06 ENCOUNTER — ORDER TRANSCRIPTION (OUTPATIENT)
Dept: PHYSICAL THERAPY | Facility: HOSPITAL | Age: 80
End: 2020-01-06

## 2020-01-06 DIAGNOSIS — G20 PARKINSON'S DISEASE (HCC): Primary | ICD-10-CM

## 2020-01-06 DIAGNOSIS — R53.82 CHRONIC FATIGUE: ICD-10-CM

## 2020-01-06 DIAGNOSIS — R26.81 UNSTEADINESS ON FEET: ICD-10-CM

## 2020-01-27 ENCOUNTER — OFFICE VISIT (OUTPATIENT)
Dept: PHYSICAL THERAPY | Facility: HOSPITAL | Age: 80
End: 2020-01-27
Attending: FAMILY MEDICINE
Payer: MEDICARE

## 2020-01-27 DIAGNOSIS — R26.81 UNSTEADINESS ON FEET: ICD-10-CM

## 2020-01-27 DIAGNOSIS — G20 PARKINSON'S DISEASE (HCC): ICD-10-CM

## 2020-01-27 DIAGNOSIS — R53.82 CHRONIC FATIGUE: ICD-10-CM

## 2020-01-27 PROCEDURE — 97112 NEUROMUSCULAR REEDUCATION: CPT

## 2020-01-27 PROCEDURE — 97162 PT EVAL MOD COMPLEX 30 MIN: CPT

## 2020-01-27 NOTE — PROGRESS NOTES
LSVT BIG EVALUATION:   Referring Physician: Dr. Kenna Trejo  Diagnosis: Parkinson's disease ([facility] [address]  Unsteadiness on feet (R26.81)  Chronic fatigue (R53.82)     Date of Service: 1/27/2020     PATIENT SUMMARY   Norah Woodall is a 78year old male who presents to mobility, donning/doffing jacket, imbalance. Hillaryjamel Caba describes prior level of function as independent prior to recent hospitalization for sepsis; performed all ADLs/IADLs without assist, was driving, and felt much stronger.    Home Situation (stairs, live such as buttoning, decreased strength, slow gait speed, poor balance, and difficulty performing functional movements such as bed mobility and stair negotiation.  The results of the objective tests and measures show impaired balance, decreased LE strength, i Independent   Chair --> Chair Independent     Static Balance:   Tandem Stance R back: 7 sec, L back: 6 sec  SLS: R 4 sec, L <2 sec    ADL Assessment:   Timed Donning/Enhaut Shirt: NT  Timed Buttoning (snaps board): 1:33 (improved to 4:17 after flicking cu AD, demonstrating improved gait speed for community ambulation and safe body mechanics  · Pt will be able to squat to  light objects around the house without loss of stability  · Pt will improve functional hip strength to demonstrate ability to [employer] care.    X___________________________________________________ Date____________________    Certification From: 0/54/9602  To:4/26/2020

## 2020-01-28 ENCOUNTER — OFFICE VISIT (OUTPATIENT)
Dept: PHYSICAL THERAPY | Facility: HOSPITAL | Age: 80
End: 2020-01-28
Attending: FAMILY MEDICINE
Payer: MEDICARE

## 2020-01-28 PROCEDURE — 97112 NEUROMUSCULAR REEDUCATION: CPT

## 2020-01-28 NOTE — PROGRESS NOTES
Dx: Parkinson's Disease         Insurance (Authorized # of Visits):  16         Authorizing Physician: Dr. Charlie Goyal  Next MD visit: none scheduled  Fall Risk: Yes        Precautions: n/a    Subjective: Pt reports feeling okay today, notes that he took his le house without loss of stability  · Pt will improve functional hip strength to demonstrate ability to ascend/descend 1 flight of stairs reciprocally without use of handrail   · Pt will improve buttoning task to <50 seconds in order to execute fine motor tas

## 2020-01-29 ENCOUNTER — APPOINTMENT (OUTPATIENT)
Dept: PHYSICAL THERAPY | Facility: HOSPITAL | Age: 80
End: 2020-01-29
Attending: FAMILY MEDICINE
Payer: MEDICARE

## 2020-01-30 ENCOUNTER — APPOINTMENT (OUTPATIENT)
Dept: PHYSICAL THERAPY | Facility: HOSPITAL | Age: 80
End: 2020-01-30
Attending: FAMILY MEDICINE
Payer: MEDICARE

## 2020-02-03 ENCOUNTER — APPOINTMENT (OUTPATIENT)
Dept: PHYSICAL THERAPY | Facility: HOSPITAL | Age: 80
End: 2020-02-03
Attending: FAMILY MEDICINE
Payer: MEDICARE

## 2020-02-04 ENCOUNTER — APPOINTMENT (OUTPATIENT)
Dept: PHYSICAL THERAPY | Facility: HOSPITAL | Age: 80
End: 2020-02-04
Attending: FAMILY MEDICINE
Payer: MEDICARE

## 2020-02-04 PROCEDURE — 97112 NEUROMUSCULAR REEDUCATION: CPT

## 2020-02-04 PROCEDURE — 97110 THERAPEUTIC EXERCISES: CPT

## 2020-02-04 NOTE — PROGRESS NOTES
Dx: Parkinson's Disease         Insurance (Authorized # of Visits):  16         Authorizing Physician: Dr. Denice Vicente  Next MD visit: none scheduled  Fall Risk: Yes        Precautions: n/a    Subjective: Pt reports feeling well after last session, however forg compliant with comprehensive HEP to maintain progress achieved in PT     Plan: Progress static and dynamic balance, decrease down to 2-3x a week as he is no longer doing LSVT.      Program Date: 1/28/2020  Tx#: 2/17   Sustained  Floor to Ceiling  x8   Susta

## 2020-02-05 ENCOUNTER — OFFICE VISIT (OUTPATIENT)
Dept: PHYSICAL THERAPY | Facility: HOSPITAL | Age: 80
End: 2020-02-05
Attending: FAMILY MEDICINE
Payer: MEDICARE

## 2020-02-05 PROCEDURE — 97110 THERAPEUTIC EXERCISES: CPT

## 2020-02-05 PROCEDURE — 97112 NEUROMUSCULAR REEDUCATION: CPT

## 2020-02-05 NOTE — PROGRESS NOTES
Dx: Parkinson's Disease         Insurance (Authorized # of Visits):  16         Authorizing Physician: Dr. Kenna Trejo  Next MD visit: none scheduled  Fall Risk: Yes        Precautions: n/a    Subjective: Patient stats that he was feeling tired yesterday and th <50 seconds in order to execute fine motor tasks with increased ease  · Pt will be independent and compliant with comprehensive HEP to maintain progress achieved in PT     Plan: Progress static and dynamic balance, decrease down to 2-3x a week as he is no

## 2020-02-06 ENCOUNTER — OFFICE VISIT (OUTPATIENT)
Dept: PHYSICAL THERAPY | Facility: HOSPITAL | Age: 80
End: 2020-02-06
Attending: FAMILY MEDICINE
Payer: MEDICARE

## 2020-02-06 PROCEDURE — 97112 NEUROMUSCULAR REEDUCATION: CPT

## 2020-02-06 PROCEDURE — 97110 THERAPEUTIC EXERCISES: CPT

## 2020-02-06 NOTE — PROGRESS NOTES
Dx: Parkinson's Disease         Insurance (Authorized # of Visits):  16         Authorizing Physician: Dr. Joel Mead  Next MD visit: none scheduled  Fall Risk: Yes        Precautions: n/a    Subjective: Pt reports that he is feeling well today.  Pt denies stif x8 ea   Repetitive Forward Step  x8 ea   Repetitive Sideways Step x8 ea   Repetitive Backward Step x8 ea   Repetitive Forward Rock and Reach x10 ea   Repetitive Sideways Rock and Reach x10 ea   Functional #1 Sit to Stand BIG x5    Functional #2 Roll in bed x 2, Therex x1     Total Timed Treatment: 45 min  Total Treatment Time: 45 min    This treatment was provided by SEBASTIAN Rivas under the direct and constant direction and supervision of a licensed therapist, who provided consultation regarding skilled

## 2020-02-10 ENCOUNTER — OFFICE VISIT (OUTPATIENT)
Dept: PHYSICAL THERAPY | Facility: HOSPITAL | Age: 80
End: 2020-02-10
Attending: FAMILY MEDICINE
Payer: MEDICARE

## 2020-02-10 PROCEDURE — 97110 THERAPEUTIC EXERCISES: CPT

## 2020-02-10 PROCEDURE — 97112 NEUROMUSCULAR REEDUCATION: CPT

## 2020-02-10 NOTE — PROGRESS NOTES
Dx: Parkinson's Disease         Insurance (Authorized # of Visits):  16         Authorizing Physician: Dr. Shannan Palacio  Next MD visit: none scheduled  Fall Risk: Yes        Precautions: n/a    Subjective: Patient states nothing new to report, did not do anythin improve functional hip strength to demonstrate ability to ascend/descend 1 flight of stairs reciprocally without use of handrail - progress  · Pt will improve buttoning task to <50 seconds in order to execute fine motor tasks with increased ease - progress (B)  Tandem balance 3x30\" Sand dune step ups, fading from BUEs to no UEs with CGA x 10 R/L  SLS alt tap to cone x 20  Tandem stance airex 2 x 30s R/L  Lunge to airex with 3 sec no UEs x 10 R/L Sand dune step ups, fading from BUEs to no UEs with CGA x 10 R

## 2020-02-11 ENCOUNTER — APPOINTMENT (OUTPATIENT)
Dept: PHYSICAL THERAPY | Facility: HOSPITAL | Age: 80
End: 2020-02-11
Attending: FAMILY MEDICINE
Payer: MEDICARE

## 2020-02-12 ENCOUNTER — OFFICE VISIT (OUTPATIENT)
Dept: PHYSICAL THERAPY | Facility: HOSPITAL | Age: 80
End: 2020-02-12
Attending: FAMILY MEDICINE
Payer: MEDICARE

## 2020-02-12 PROCEDURE — 97112 NEUROMUSCULAR REEDUCATION: CPT

## 2020-02-12 PROCEDURE — 97110 THERAPEUTIC EXERCISES: CPT

## 2020-02-12 NOTE — PROGRESS NOTES
Dx: Parkinson's Disease         Insurance (Authorized # of Visits):  16         Authorizing Physician: Dr. Jesus Goode MD visit: none scheduled  Fall Risk: Yes        Precautions: n/a    Subjective: Patient states nothing really new to report since Monday demonstrating improved gait speed for community ambulation and safe body mechanics - progress  · Pt will be able to squat to  light objects around the house without loss of stability - progress  · Pt will improve functional hip strength to Alisson Rodney hip abduction with manual blocking roll back as needed 2 x 10  Shuttle with incline pillow, DLS 31# mid-range 2 x 10  Shuttle with incline pillow, SLS, 50# mid-range 2 x 10 R/L NuStep L6 x 5 min  SL hip abduction with manual blocking roll back as needed 2 motion x 5  SB bridge with arms across chest 2 x 10     - - - -    HEP: LSVT BIG exercises and BIG walking at home 2x/day  -griselda L1  -bridges  -prone planks on elbows and knees  -prone UE alternating swimmers      Charges: NR x 2, Therex x1     Total Ti

## 2020-02-13 ENCOUNTER — OFFICE VISIT (OUTPATIENT)
Dept: PHYSICAL THERAPY | Facility: HOSPITAL | Age: 80
End: 2020-02-13
Attending: FAMILY MEDICINE
Payer: MEDICARE

## 2020-02-13 PROCEDURE — 97112 NEUROMUSCULAR REEDUCATION: CPT

## 2020-02-13 PROCEDURE — 97110 THERAPEUTIC EXERCISES: CPT

## 2020-02-13 NOTE — PROGRESS NOTES
Dx: Parkinson's Disease         Insurance (Authorized # of Visits):  16         Authorizing Physician: Dr. Rufus Goode MD visit: none scheduled  Fall Risk: Yes        Precautions: n/a    Subjective: Pt denies any changes in medical status.  He refuses to r execute fine motor tasks with increased ease - progress  · Pt will be independent and compliant with comprehensive HEP to maintain progress achieved in PT - issued and reviewed    Plan: Progress static and dynamic balance, decrease down to 2-3x a week as h blocking roll back as needed 2 x 10  TRX squats 2 x 10  x5 Big punches B sleeves into coat in standing.    SLS balance with 1 UE assist 3x30 (B)  Tandem balance 3x30\" Sand dune step ups, fading from BUEs to no UEs with CGA x 10 R/L  SLS alt tap to cone x 2 roll R>reverse to return to beginning x 5  Scooting up in bed using bridging motion x 5  SB bridge with arms across chest 2 x 10 Sit>SL>roll to back> roll R>reverse to return to beginning x 5  Scooting up in bed using bridging motion x 5  SB bridge with ar

## 2020-02-17 ENCOUNTER — APPOINTMENT (OUTPATIENT)
Dept: PHYSICAL THERAPY | Facility: HOSPITAL | Age: 80
End: 2020-02-17
Attending: FAMILY MEDICINE
Payer: MEDICARE

## 2020-02-18 ENCOUNTER — OFFICE VISIT (OUTPATIENT)
Dept: PHYSICAL THERAPY | Facility: HOSPITAL | Age: 80
End: 2020-02-18
Attending: FAMILY MEDICINE
Payer: MEDICARE

## 2020-02-18 PROCEDURE — 97110 THERAPEUTIC EXERCISES: CPT

## 2020-02-18 PROCEDURE — 97112 NEUROMUSCULAR REEDUCATION: CPT

## 2020-02-18 NOTE — PROGRESS NOTES
Dx: Parkinson's Disease         Insurance (Authorized # of Visits):  16         Authorizing Physician: Dr. Geovany Tuttle  Next MD visit: none scheduled  Fall Risk: Yes        Precautions: n/a    Subjective: Pt replies that he is doing well, his exercises are dalia return to therapy with a new script upon his return.      Program Date: 1/28/2020  Tx#: 2/17   Sustained  Floor to Ceiling  x8   Sustained  Side to Side x8 ea   Repetitive Forward Step  x8 ea   Repetitive Sideways Step x8 ea   Repetitive Backward Step x8 ea assist 3x30 (B)  Tandem balance 3x30\" Sand dune step ups, fading from BUEs to no UEs with CGA x 10 R/L  SLS alt tap to cone x 20  Tandem stance airex 2 x 30s R/L  Lunge to airex with 3 sec no UEs x 10 R/L Sand dune step ups, fading from BUEs to no UEs wit bed in sidelying x10 each Sit>SL>roll to back> roll R>reverse to return to beginning x 5  Scooting up in bed using bridging motion x 5  Bridge with ball squeeze x 15  Rolling to prone with alt hip ext x 10 R/L (hard) Sit>SL>roll to back> roll R>reverse to

## 2020-02-19 ENCOUNTER — APPOINTMENT (OUTPATIENT)
Dept: PHYSICAL THERAPY | Facility: HOSPITAL | Age: 80
End: 2020-02-19
Attending: FAMILY MEDICINE
Payer: MEDICARE

## 2020-02-20 ENCOUNTER — OFFICE VISIT (OUTPATIENT)
Dept: PHYSICAL THERAPY | Facility: HOSPITAL | Age: 80
End: 2020-02-20
Attending: FAMILY MEDICINE
Payer: MEDICARE

## 2020-02-20 PROCEDURE — 97112 NEUROMUSCULAR REEDUCATION: CPT

## 2020-02-20 PROCEDURE — 97110 THERAPEUTIC EXERCISES: CPT

## 2020-02-20 NOTE — PROGRESS NOTES
Dx: Parkinson's Disease         Insurance (Authorized # of Visits):  16         Authorizing Physician: Dr. Olesya Campbell  Next MD visit: none scheduled  Fall Risk: Yes        Precautions: n/a    Progress Summary  Pt has attended , cancelled 3, and no shown 0 visi Progressing. Rehab Potential: good    Plan: Pt to take a 3 week hold from therapy for vacation then to continue skilled Physical Therapy 2 x/week or a total of 8 visits over a 35 day period.  Treatment will include: balance, dynamic balance, ambulation, to dec retro roll x 10 R/L  Brackney spri side step in // bars x 2 laps  Snapshot Interactive fwd step x 2 laps // bars NuStep L5 x 5 min  Prone swimmers with alternating UE lifts   Prone planks on elbows and knees 2x15 seconds   Clams (B) 2x10  Bridges 2x10  L1 emilie taps  Seated on SB marching, 2 x 20 GGA-Nick  Seated on SB attempted alt LAQ, poor x 10 R/L min-modA Seated on SB marching, 2 x 20 GGA-Nick  Seated on SB arms performing figure 8 2x10 each direction, with feet on floor, verbal cues to sit up straight x 10 return to beginning x 5  Scooting up in bed using bridging motion x 5  SB bridge with arms across chest 2 x 10  Shuttle with wedge 3x10: 100lbs Task break down: leg off edge of mat x5  10x thoracic rotation  Sit>SL>roll to back> roll R>reverse to return to

## 2020-02-25 ENCOUNTER — APPOINTMENT (OUTPATIENT)
Dept: PHYSICAL THERAPY | Facility: HOSPITAL | Age: 80
End: 2020-02-25
Attending: FAMILY MEDICINE
Payer: MEDICARE

## 2020-06-25 PROBLEM — I10 ESSENTIAL HYPERTENSION: Status: ACTIVE | Noted: 2020-06-25

## 2021-03-06 ENCOUNTER — HOSPITAL ENCOUNTER (INPATIENT)
Facility: HOSPITAL | Age: 81
LOS: 3 days | Discharge: HOME | DRG: 247 | End: 2021-03-09
Attending: EMERGENCY MEDICINE | Admitting: HOSPITALIST
Payer: MEDICARE

## 2021-03-06 ENCOUNTER — APPOINTMENT (OUTPATIENT)
Dept: CV DIAGNOSTICS | Facility: HOSPITAL | Age: 81
DRG: 247 | End: 2021-03-06
Attending: HOSPITALIST
Payer: MEDICARE

## 2021-03-06 ENCOUNTER — APPOINTMENT (OUTPATIENT)
Dept: GENERAL RADIOLOGY | Facility: HOSPITAL | Age: 81
DRG: 247 | End: 2021-03-06
Attending: EMERGENCY MEDICINE
Payer: MEDICARE

## 2021-03-06 ENCOUNTER — APPOINTMENT (OUTPATIENT)
Dept: INTERVENTIONAL RADIOLOGY/VASCULAR | Facility: HOSPITAL | Age: 81
DRG: 247 | End: 2021-03-06
Attending: INTERNAL MEDICINE
Payer: MEDICARE

## 2021-03-06 DIAGNOSIS — I21.4 NSTEMI (NON-ST ELEVATED MYOCARDIAL INFARCTION) (HCC): Primary | ICD-10-CM

## 2021-03-06 LAB
ALBUMIN SERPL-MCNC: 3.7 G/DL (ref 3.4–5)
ALBUMIN/GLOB SERPL: 1.1 {RATIO} (ref 1–2)
ALP LIVER SERPL-CCNC: 80 U/L
ALT SERPL-CCNC: 14 U/L
ANION GAP SERPL CALC-SCNC: 5 MMOL/L (ref 0–18)
ANION GAP SERPL CALC-SCNC: 6 MMOL/L (ref 0–18)
AST SERPL-CCNC: 14 U/L (ref 15–37)
BASOPHILS # BLD AUTO: 0.06 X10(3) UL (ref 0–0.2)
BASOPHILS NFR BLD AUTO: 1 %
BILIRUB SERPL-MCNC: 0.4 MG/DL (ref 0.1–2)
BUN BLD-MCNC: 16 MG/DL (ref 7–18)
BUN BLD-MCNC: 17 MG/DL (ref 7–18)
BUN/CREAT SERPL: 15.7 (ref 10–20)
BUN/CREAT SERPL: 16.2 (ref 10–20)
CALCIUM BLD-MCNC: 9.2 MG/DL (ref 8.5–10.1)
CALCIUM BLD-MCNC: 9.6 MG/DL (ref 8.5–10.1)
CHLORIDE SERPL-SCNC: 101 MMOL/L (ref 98–112)
CHLORIDE SERPL-SCNC: 104 MMOL/L (ref 98–112)
CHOLEST SMN-MCNC: 180 MG/DL (ref ?–200)
CO2 SERPL-SCNC: 27 MMOL/L (ref 21–32)
CO2 SERPL-SCNC: 29 MMOL/L (ref 21–32)
CREAT BLD-MCNC: 1.02 MG/DL
CREAT BLD-MCNC: 1.05 MG/DL
D-DIMER: 0.36 UG/ML FEU (ref ?–0.8)
DEPRECATED RDW RBC AUTO: 45.3 FL (ref 35.1–46.3)
DEPRECATED RDW RBC AUTO: 46 FL (ref 35.1–46.3)
EOSINOPHIL # BLD AUTO: 0.18 X10(3) UL (ref 0–0.7)
EOSINOPHIL NFR BLD AUTO: 2.9 %
ERYTHROCYTE [DISTWIDTH] IN BLOOD BY AUTOMATED COUNT: 14.5 % (ref 11–15)
ERYTHROCYTE [DISTWIDTH] IN BLOOD BY AUTOMATED COUNT: 14.5 % (ref 11–15)
GLOBULIN PLAS-MCNC: 3.5 G/DL (ref 2.8–4.4)
GLUCOSE BLD-MCNC: 104 MG/DL (ref 70–99)
GLUCOSE BLD-MCNC: 114 MG/DL (ref 70–99)
HCT VFR BLD AUTO: 41.3 %
HCT VFR BLD AUTO: 41.6 %
HDLC SERPL-MCNC: 92 MG/DL (ref 40–59)
HGB BLD-MCNC: 13.3 G/DL
HGB BLD-MCNC: 13.4 G/DL
IMM GRANULOCYTES # BLD AUTO: 0.04 X10(3) UL (ref 0–1)
IMM GRANULOCYTES NFR BLD: 0.7 %
ISTAT ACTIVATED CLOTTING TIME: 290 SECONDS (ref 74–137)
LDLC SERPL CALC-MCNC: 80 MG/DL (ref ?–100)
LYMPHOCYTES # BLD AUTO: 2.17 X10(3) UL (ref 1–4)
LYMPHOCYTES NFR BLD AUTO: 35.5 %
M PROTEIN MFR SERPL ELPH: 7.2 G/DL (ref 6.4–8.2)
MCH RBC QN AUTO: 27.6 PG (ref 26–34)
MCH RBC QN AUTO: 28.2 PG (ref 26–34)
MCHC RBC AUTO-ENTMCNC: 32 G/DL (ref 31–37)
MCHC RBC AUTO-ENTMCNC: 32.4 G/DL (ref 31–37)
MCV RBC AUTO: 86.3 FL
MCV RBC AUTO: 86.9 FL
MONOCYTES # BLD AUTO: 0.64 X10(3) UL (ref 0.1–1)
MONOCYTES NFR BLD AUTO: 10.5 %
NEUTROPHILS # BLD AUTO: 3.03 X10 (3) UL (ref 1.5–7.7)
NEUTROPHILS # BLD AUTO: 3.03 X10(3) UL (ref 1.5–7.7)
NEUTROPHILS NFR BLD AUTO: 49.4 %
NONHDLC SERPL-MCNC: 88 MG/DL (ref ?–130)
NT-PROBNP SERPL-MCNC: 403 PG/ML (ref ?–450)
OSMOLALITY SERPL CALC.SUM OF ELEC: 282 MOSM/KG (ref 275–295)
OSMOLALITY SERPL CALC.SUM OF ELEC: 286 MOSM/KG (ref 275–295)
PLATELET # BLD AUTO: 224 10(3)UL (ref 150–450)
PLATELET # BLD AUTO: 228 10(3)UL (ref 150–450)
POTASSIUM SERPL-SCNC: 4.2 MMOL/L (ref 3.5–5.1)
POTASSIUM SERPL-SCNC: 4.2 MMOL/L (ref 3.5–5.1)
RBC # BLD AUTO: 4.75 X10(6)UL
RBC # BLD AUTO: 4.82 X10(6)UL
SARS-COV-2 RNA RESP QL NAA+PROBE: NOT DETECTED
SODIUM SERPL-SCNC: 135 MMOL/L (ref 136–145)
SODIUM SERPL-SCNC: 137 MMOL/L (ref 136–145)
TRIGL SERPL-MCNC: 38 MG/DL (ref 30–149)
TROPONIN I SERPL-MCNC: 0.1 NG/ML (ref ?–0.04)
TROPONIN I SERPL-MCNC: <0.045 NG/ML (ref ?–0.04)
VLDLC SERPL CALC-MCNC: 8 MG/DL (ref 0–30)
WBC # BLD AUTO: 4.6 X10(3) UL (ref 4–11)
WBC # BLD AUTO: 6.1 X10(3) UL (ref 4–11)

## 2021-03-06 PROCEDURE — 4A023N7 MEASUREMENT OF CARDIAC SAMPLING AND PRESSURE, LEFT HEART, PERCUTANEOUS APPROACH: ICD-10-PCS | Performed by: STUDENT IN AN ORGANIZED HEALTH CARE EDUCATION/TRAINING PROGRAM

## 2021-03-06 PROCEDURE — 71045 X-RAY EXAM CHEST 1 VIEW: CPT | Performed by: EMERGENCY MEDICINE

## 2021-03-06 PROCEDURE — 99223 1ST HOSP IP/OBS HIGH 75: CPT | Performed by: HOSPITALIST

## 2021-03-06 PROCEDURE — B211YZZ FLUOROSCOPY OF MULTIPLE CORONARY ARTERIES USING OTHER CONTRAST: ICD-10-PCS | Performed by: STUDENT IN AN ORGANIZED HEALTH CARE EDUCATION/TRAINING PROGRAM

## 2021-03-06 PROCEDURE — B41FYZZ FLUOROSCOPY OF RIGHT LOWER EXTREMITY ARTERIES USING OTHER CONTRAST: ICD-10-PCS | Performed by: STUDENT IN AN ORGANIZED HEALTH CARE EDUCATION/TRAINING PROGRAM

## 2021-03-06 PROCEDURE — 027034Z DILATION OF CORONARY ARTERY, ONE ARTERY WITH DRUG-ELUTING INTRALUMINAL DEVICE, PERCUTANEOUS APPROACH: ICD-10-PCS | Performed by: STUDENT IN AN ORGANIZED HEALTH CARE EDUCATION/TRAINING PROGRAM

## 2021-03-06 PROCEDURE — 93306 TTE W/DOPPLER COMPLETE: CPT | Performed by: HOSPITALIST

## 2021-03-06 RX ORDER — FINASTERIDE 5 MG/1
5 TABLET, FILM COATED ORAL DAILY
Status: DISCONTINUED | OUTPATIENT
Start: 2021-03-06 | End: 2021-03-06

## 2021-03-06 RX ORDER — NITROGLYCERIN 0.4 MG/1
TABLET SUBLINGUAL
Status: COMPLETED
Start: 2021-03-06 | End: 2021-03-06

## 2021-03-06 RX ORDER — AMANTADINE HYDROCHLORIDE 100 MG/1
100 TABLET ORAL 2 TIMES DAILY
Status: DISCONTINUED | OUTPATIENT
Start: 2021-03-06 | End: 2021-03-09

## 2021-03-06 RX ORDER — MIDAZOLAM HYDROCHLORIDE 1 MG/ML
INJECTION INTRAMUSCULAR; INTRAVENOUS
Status: COMPLETED
Start: 2021-03-06 | End: 2021-03-06

## 2021-03-06 RX ORDER — HEPARIN SODIUM AND DEXTROSE 10000; 5 [USP'U]/100ML; G/100ML
12 INJECTION INTRAVENOUS ONCE
Status: DISCONTINUED | OUTPATIENT
Start: 2021-03-06 | End: 2021-03-06

## 2021-03-06 RX ORDER — LABETALOL HYDROCHLORIDE 5 MG/ML
10 INJECTION, SOLUTION INTRAVENOUS EVERY 4 HOURS PRN
Status: DISCONTINUED | OUTPATIENT
Start: 2021-03-06 | End: 2021-03-09

## 2021-03-06 RX ORDER — ASPIRIN 81 MG/1
81 TABLET, CHEWABLE ORAL DAILY
Status: DISCONTINUED | OUTPATIENT
Start: 2021-03-06 | End: 2021-03-06

## 2021-03-06 RX ORDER — LISINOPRIL 10 MG/1
10 TABLET ORAL DAILY
Status: DISCONTINUED | OUTPATIENT
Start: 2021-03-06 | End: 2021-03-09

## 2021-03-06 RX ORDER — ONDANSETRON 2 MG/ML
8 INJECTION INTRAMUSCULAR; INTRAVENOUS EVERY 6 HOURS PRN
Status: DISCONTINUED | OUTPATIENT
Start: 2021-03-06 | End: 2021-03-06

## 2021-03-06 RX ORDER — ACETAMINOPHEN 325 MG/1
650 TABLET ORAL EVERY 6 HOURS PRN
Status: DISCONTINUED | OUTPATIENT
Start: 2021-03-06 | End: 2021-03-09

## 2021-03-06 RX ORDER — SODIUM CHLORIDE 9 MG/ML
INJECTION, SOLUTION INTRAVENOUS
Status: DISCONTINUED | OUTPATIENT
Start: 2021-03-07 | End: 2021-03-06 | Stop reason: HOSPADM

## 2021-03-06 RX ORDER — HEPARIN SODIUM AND DEXTROSE 10000; 5 [USP'U]/100ML; G/100ML
INJECTION INTRAVENOUS CONTINUOUS
Status: DISCONTINUED | OUTPATIENT
Start: 2021-03-06 | End: 2021-03-06

## 2021-03-06 RX ORDER — FINASTERIDE 5 MG/1
5 TABLET, FILM COATED ORAL NIGHTLY
Status: DISCONTINUED | OUTPATIENT
Start: 2021-03-06 | End: 2021-03-09

## 2021-03-06 RX ORDER — MELATONIN
3 NIGHTLY PRN
Status: DISCONTINUED | OUTPATIENT
Start: 2021-03-06 | End: 2021-03-09

## 2021-03-06 RX ORDER — ASPIRIN 81 MG/1
81 TABLET ORAL DAILY
Status: DISCONTINUED | OUTPATIENT
Start: 2021-03-07 | End: 2021-03-09

## 2021-03-06 RX ORDER — ALPRAZOLAM 0.25 MG/1
0.25 TABLET ORAL NIGHTLY PRN
Status: DISCONTINUED | OUTPATIENT
Start: 2021-03-06 | End: 2021-03-09

## 2021-03-06 RX ORDER — HEPARIN SODIUM 5000 [USP'U]/ML
INJECTION, SOLUTION INTRAVENOUS; SUBCUTANEOUS
Status: COMPLETED
Start: 2021-03-06 | End: 2021-03-06

## 2021-03-06 RX ORDER — LIDOCAINE HYDROCHLORIDE 10 MG/ML
INJECTION, SOLUTION EPIDURAL; INFILTRATION; INTRACAUDAL; PERINEURAL
Status: COMPLETED
Start: 2021-03-06 | End: 2021-03-06

## 2021-03-06 RX ORDER — ONDANSETRON 2 MG/ML
4 INJECTION INTRAMUSCULAR; INTRAVENOUS EVERY 4 HOURS PRN
Status: DISCONTINUED | OUTPATIENT
Start: 2021-03-06 | End: 2021-03-06 | Stop reason: HOSPADM

## 2021-03-06 RX ORDER — SODIUM CHLORIDE 9 MG/ML
INJECTION, SOLUTION INTRAVENOUS CONTINUOUS
Status: ACTIVE | OUTPATIENT
Start: 2021-03-06 | End: 2021-03-06

## 2021-03-06 RX ORDER — HYDRALAZINE HYDROCHLORIDE 20 MG/ML
10 INJECTION INTRAMUSCULAR; INTRAVENOUS
Status: DISCONTINUED | OUTPATIENT
Start: 2021-03-06 | End: 2021-03-09

## 2021-03-06 RX ORDER — POLYETHYLENE GLYCOL 3350 17 G/17G
17 POWDER, FOR SOLUTION ORAL DAILY PRN
Status: DISCONTINUED | OUTPATIENT
Start: 2021-03-06 | End: 2021-03-09

## 2021-03-06 RX ORDER — BISACODYL 10 MG
10 SUPPOSITORY, RECTAL RECTAL
Status: DISCONTINUED | OUTPATIENT
Start: 2021-03-06 | End: 2021-03-09

## 2021-03-06 RX ORDER — ATORVASTATIN CALCIUM 40 MG/1
40 TABLET, FILM COATED ORAL NIGHTLY
Status: DISCONTINUED | OUTPATIENT
Start: 2021-03-06 | End: 2021-03-09

## 2021-03-06 RX ORDER — FINASTERIDE 5 MG/1
5 TABLET, FILM COATED ORAL DAILY
COMMUNITY

## 2021-03-06 RX ORDER — ONDANSETRON 2 MG/ML
8 INJECTION INTRAMUSCULAR; INTRAVENOUS EVERY 6 HOURS PRN
Status: DISCONTINUED | OUTPATIENT
Start: 2021-03-06 | End: 2021-03-09

## 2021-03-06 NOTE — H&P
[facility] HOSPITALIST  History and Physical     Florin Nickel Patient Status:  Emergency    1940 MRN [id number]   [facility] Sue Santana MD     Chief Compla Hypertension Brother         Allergies:   Penicillins             RASH    Medications:  No current facility-administered medications on file prior to encounter. metoprolol Tartrate 25 MG Oral Tab, Take 1/2 of a 25 MG twice daily. , Disp: 90 tablet, Rfl: 0 the last 168 hours. Invalid input(s): LYM#, MONO#, BASOS#, EOSIN#    No results for input(s): GLU, BUN, CREATSERUM, GFRAA, GFRNAA, CA, ALB, NA, K, CL, CO2, ALKPHO, AST, ALT, BILT, TP in the last 168 hours.     CrCl cannot be calculated (Patient's most re

## 2021-03-06 NOTE — PROGRESS NOTES
Patient seen and examined independently. H and P reviewed. No changes in H and P. Risks and benefits of procedure were discussed with patient. Airway examined. Patient is ASA class 3 and mallampati class 2. Pt is appropriate for conscious sedation.  No his

## 2021-03-06 NOTE — PLAN OF CARE
Received pt at 0730. A&o x4. NSR/SB on tele with frequent PVCs. VSS. C/o 5/10 midsternal chest pain. 2 tabs of nitroglycerin given. WNL on room air. Up stand by. Last BM 3/5 with good urine output. Cardiology at bedside.  Aware of elevated troponin and ches electrolytes and administer replacement therapy as ordered  Outcome: Progressing

## 2021-03-06 NOTE — PHYSICAL THERAPY NOTE
PT orders received and chart reviewed. Per discussion with RN pt is currently having chest pain and is awaiting cardiology consult. Asked to hold for today. Will follow and re-attempt as able and appropriate.

## 2021-03-06 NOTE — PROCEDURES
[id number] [facility] Location: Cath Lab    CSN [id number] MRN [id number]   Admission Date 3/6/2021 Procedure Date 3/6/2021   Attending Physician Erlin Andrew MD Procedure Physician Rosi Garcia MD         CARDIAC CATHETERIZATION/PERCUTANEOU irregularities     RCA:  The right coronary artery is a large caliber dominant vessel with a 40% proximal stenosis and 95% distal acute appearing lesion. The rPDA is medium caliber with mild luminal irregularities.     INTERVENTIONAL PROCEDURE:   Unfraction

## 2021-03-06 NOTE — DIETARY NOTE
Clinical Nutrition Note    Dietitian consult received per cardiac rehab/CHF protocol. Pt to be educated by cardiac rehab staff and encouraged to attend outpatient classes taught by AUSTIN. AUSTIN available PRN.     Estefani Alfaro RD,JUSTINN  Phone #[phone]  Pager #[phone]

## 2021-03-06 NOTE — CONSULTS
Cardiology Consult Note       Cardiology attending:  Seen and examined. Note edited.   Discussed with son in [place].    SUBJECTIVE:    Reason for Consultation: chest pain     History of Present Illness: Patient is [de-identified]year old male with HTN and dyslipidemia who Provider External/Patient, Reported    Medication finasteride (PROSCAR) 5 MG Oral Tab, Sig Take 5 mg by mouth nightly.  , Start Date , End Date , Taking? , Authorizing Provider External/Patient, Reported          Penicillins             RASH    Past Medica 12 Units/kg/hr Intravenous Once   • Amantadine HCl  100 mg Oral BID   • carbidopa-levodopa  1 tablet Oral QID   • finasteride  5 mg Oral Daily   • metoprolol Tartrate  12.5 mg Oral 2x Daily(Beta Blocker)       Continuous Infusions:   • Continuous dose Hep not demonstrate any evidence of peripheral edema. No cyanosis or clubbing of the digits is appreciated. Venous stasis changes are noted. Skin:  There is no jaundice. Neurological exam is grossly intact.     Diagnostics:  CLINICAL HISTORY:   A 75-year-o Wall thickness was normal.      Systolic function was normal. Systolic pressure was moderately increased.      RV systolic pressure (S, est): 55mm Hg.    3. Aortic valve: Probably trileaflet; mildly calcified leaflets.      Calcification is predominantly lo There   was no evidence for stenosis. There was mild-moderate regurgitation. Pulmonic valve:    Structurally normal valve.   Cusp separation was normal.   Doppler:  Transvalvular velocity was within the normal range. There was no   evidence for stenosis. proceed. The potential for PCI has been reviewed, including the potential need for emergent CABG.

## 2021-03-07 LAB
ANION GAP SERPL CALC-SCNC: 6 MMOL/L (ref 0–18)
ATRIAL RATE: 61 BPM
ATRIAL RATE: 65 BPM
ATRIAL RATE: 67 BPM
ATRIAL RATE: 78 BPM
BUN BLD-MCNC: 15 MG/DL (ref 7–18)
BUN/CREAT SERPL: 17.9 (ref 10–20)
CALCIUM BLD-MCNC: 8.8 MG/DL (ref 8.5–10.1)
CHLORIDE SERPL-SCNC: 103 MMOL/L (ref 98–112)
CO2 SERPL-SCNC: 28 MMOL/L (ref 21–32)
CREAT BLD-MCNC: 0.84 MG/DL
DEPRECATED RDW RBC AUTO: 44.8 FL (ref 35.1–46.3)
ERYTHROCYTE [DISTWIDTH] IN BLOOD BY AUTOMATED COUNT: 14.4 % (ref 11–15)
GLUCOSE BLD-MCNC: 101 MG/DL (ref 70–99)
HCT VFR BLD AUTO: 39.9 %
HGB BLD-MCNC: 13.1 G/DL
MCH RBC QN AUTO: 28.3 PG (ref 26–34)
MCHC RBC AUTO-ENTMCNC: 32.8 G/DL (ref 31–37)
MCV RBC AUTO: 86.2 FL
OSMOLALITY SERPL CALC.SUM OF ELEC: 285 MOSM/KG (ref 275–295)
P AXIS: 109 DEGREES
P AXIS: 63 DEGREES
P AXIS: 64 DEGREES
P AXIS: 77 DEGREES
P-R INTERVAL: 196 MS
P-R INTERVAL: 196 MS
P-R INTERVAL: 198 MS
P-R INTERVAL: 208 MS
PLATELET # BLD AUTO: 208 10(3)UL (ref 150–450)
PLATELET # BLD AUTO: 208 10(3)UL (ref 150–450)
POTASSIUM SERPL-SCNC: 4.2 MMOL/L (ref 3.5–5.1)
Q-T INTERVAL: 372 MS
Q-T INTERVAL: 378 MS
Q-T INTERVAL: 396 MS
Q-T INTERVAL: 410 MS
QRS DURATION: 70 MS
QRS DURATION: 76 MS
QRS DURATION: 78 MS
QRS DURATION: 78 MS
QTC CALCULATION (BEZET): 393 MS
QTC CALCULATION (BEZET): 412 MS
QTC CALCULATION (BEZET): 418 MS
QTC CALCULATION (BEZET): 424 MS
R AXIS: 60 DEGREES
R AXIS: 76 DEGREES
R AXIS: 77 DEGREES
R AXIS: 86 DEGREES
RBC # BLD AUTO: 4.63 X10(6)UL
SODIUM SERPL-SCNC: 137 MMOL/L (ref 136–145)
T AXIS: 56 DEGREES
T AXIS: 73 DEGREES
T AXIS: 74 DEGREES
T AXIS: 91 DEGREES
VENTRICULAR RATE: 61 BPM
VENTRICULAR RATE: 65 BPM
VENTRICULAR RATE: 67 BPM
VENTRICULAR RATE: 78 BPM
WBC # BLD AUTO: 5.7 X10(3) UL (ref 4–11)

## 2021-03-07 PROCEDURE — 99232 SBSQ HOSP IP/OBS MODERATE 35: CPT | Performed by: HOSPITALIST

## 2021-03-07 RX ORDER — CARBIDOPA AND LEVODOPA 25; 100 MG/1; MG/1
1 TABLET, EXTENDED RELEASE ORAL NIGHTLY
Status: DISCONTINUED | OUTPATIENT
Start: 2021-03-07 | End: 2021-03-09

## 2021-03-07 NOTE — PROGRESS NOTES
IVA HOSPITALIST  Progress Note     Florin Krishnamurthy Patient Status:  Inpatient    1940 MRN [id number]   [facility] 8NE-A Attending Rock De La Torre MD   Hosp Day # 1 PCP Sue Santana MD     Chief Complaint: CP    S: Patient doing wel 03/06/21  0136 03/06/21  0805   TROP <0.045 0.101*            Imaging: Imaging data reviewed in Epic.     Medications:   • Carbidopa-Levodopa ER  1 tablet Oral Nightly   • Amantadine HCl  100 mg Oral BID   • carbidopa-levodopa  1 tablet Oral QID   • finaste

## 2021-03-07 NOTE — PLAN OF CARE
Patient is alert and oriented x4, anxious. NSR on tele. Oxygenation is 100% on RA. L sounds clear. VSS. Patient denies chest pain, shortness of breath, palpitations. Denies pain. R groin dressing in place, soft/no hematoma, ecchymosis at the sight.  Patient threatening arrhythmias  - Monitor electrolytes and administer replacement therapy as ordered  Outcome: Progressing

## 2021-03-07 NOTE — PLAN OF CARE
Alert & oriented. Room air, saturations >90%. Denies sob. NSR on tele. S/p cardiac cath, rt groin site dry and intact, bruising and some mild swelling present, soft/no hematoma palpated. Bruising marked with pen to monitor. Patient denies pain SCDs on.  Con

## 2021-03-07 NOTE — PROGRESS NOTES
[facility] Cardiology Progress Note        Rufus Aly Patient Status:  Inpatient    1940 MRN [id number]   [facility] 8NE-A Attending Sharon Dutton MD   Hosp Day # 1 PCP Faiza Matos MD     Subjective:  No Labs   Lab 03/06/21  0136   ALT 14*   AST 14*   ALB 3.7       Recent Labs   Lab 03/06/21  0136 03/06/21  0805   TROP <0.045 0.101*       No results for input(s): PTP, INR in the last 168 hours. Impression:  1.  Unstable angina/ NSTEMI   Cath 3/

## 2021-03-07 NOTE — PHYSICAL THERAPY NOTE
PHYSICAL THERAPY QUICK EVALUATION - INPATIENT    Room Number: 6013/5878-M  Evaluation Date: 3/7/2021  Presenting Problem: chest pain  Physician Order: PT Eval and Treat    Problem List  Principal Problem:    NSTEMI (non-ST elevated myocardial infarction) STRENGTH ASSESSMENT  Upper extremity ROM and strength are within functional limits     Lower extremity ROM is within functional limits     Lower extremity strength is within functional limits     NEUROLOGICAL FINDINGS  Neurological Findings: Sensation 1 flight of stairs with one hand rail with supervision. Pt returned to his room and was seated in [facility]. Pt educated on maintaining activity level and calling for assist from hospital staff for mobility needs.   Pt left in sitting with call light and needs

## 2021-03-08 ENCOUNTER — APPOINTMENT (OUTPATIENT)
Dept: INTERVENTIONAL RADIOLOGY/VASCULAR | Facility: HOSPITAL | Age: 81
DRG: 247 | End: 2021-03-08
Attending: STUDENT IN AN ORGANIZED HEALTH CARE EDUCATION/TRAINING PROGRAM
Payer: MEDICARE

## 2021-03-08 LAB
ISTAT ACTIVATED CLOTTING TIME: 329 SECONDS (ref 74–137)
PLATELET # BLD AUTO: 219 10(3)UL (ref 150–450)

## 2021-03-08 PROCEDURE — 027034Z DILATION OF CORONARY ARTERY, ONE ARTERY WITH DRUG-ELUTING INTRALUMINAL DEVICE, PERCUTANEOUS APPROACH: ICD-10-PCS | Performed by: INTERNAL MEDICINE

## 2021-03-08 PROCEDURE — 02C03ZZ EXTIRPATION OF MATTER FROM CORONARY ARTERY, ONE ARTERY, PERCUTANEOUS APPROACH: ICD-10-PCS | Performed by: INTERNAL MEDICINE

## 2021-03-08 PROCEDURE — 99232 SBSQ HOSP IP/OBS MODERATE 35: CPT | Performed by: HOSPITALIST

## 2021-03-08 RX ORDER — HEPARIN SODIUM 5000 [USP'U]/ML
INJECTION, SOLUTION INTRAVENOUS; SUBCUTANEOUS
Status: COMPLETED
Start: 2021-03-08 | End: 2021-03-08

## 2021-03-08 RX ORDER — LIDOCAINE HYDROCHLORIDE 10 MG/ML
INJECTION, SOLUTION EPIDURAL; INFILTRATION; INTRACAUDAL; PERINEURAL
Status: COMPLETED
Start: 2021-03-08 | End: 2021-03-08

## 2021-03-08 RX ORDER — MIDAZOLAM HYDROCHLORIDE 1 MG/ML
INJECTION INTRAMUSCULAR; INTRAVENOUS
Status: COMPLETED
Start: 2021-03-08 | End: 2021-03-08

## 2021-03-08 NOTE — PROGRESS NOTES
IVA HOSPITALIST  Progress Note     Samantha Orr Patient Status:  Inpatient    1940 MRN [id number]   [facility] 8NE-A Attending Jenifer Galeana MD   Hosp Day # 2 PCP Fouzia Lau MD     Chief Complaint: CP    S: Patient doing wel 03/06/21  0136 03/06/21  0805   TROP <0.045 0.101*            Imaging: Imaging data reviewed in Epic.     Medications:   • Carbidopa-Levodopa ER  1 tablet Oral Nightly   • Amantadine HCl  100 mg Oral BID   • carbidopa-levodopa  1 tablet Oral QID   • finaste

## 2021-03-08 NOTE — CM/SW NOTE
03/08/21 1400   CM/SW Referral Data   Referral Source Social Work (self-referral)   Reason for Referral Discharge planning;Psychoscial assessment   Informant Patient   Patient Info   Patient's Mental Status Alert;Oriented   Patient's [address]

## 2021-03-08 NOTE — OCCUPATIONAL THERAPY NOTE
OCCUPATIONAL THERAPY QUICK EVALUATION - INPATIENT    Room Number: 3497/9073-D  Evaluation Date: 3/8/2021     Type of Evaluation: Initial  Presenting Problem: NSTEMI    Physician Order: IP Consult to Occupational Therapy  Reason for Therapy:  ADL/IADL Dysfu Restriction: None                PAIN ASSESSMENT  Ratin  Location: no pain reported       COGNITION  WNL    RANGE OF MOTION AND STRENGTH ASSESSMENT  Upper extremity ROM is within functional limits     Upper extremity strength is within functional limit ADL tasks, functional transfers, dynamic reaching and functional mobility safely, without loss of balance, and at supervision level or above. Patient reports no further questions or concerns about safe return to I/ADL tasks.  No further OT services needed a

## 2021-03-08 NOTE — PLAN OF CARE
Problem: Patient/Family Goals  Goal: Patient/Family Long Term Goal  Description: Patient's Long Term Goal: discharge    Interventions:  - Cardiac catheterization  - See MD as ordered  - Medications as ordered  - monitor vitals  - See additional Care Plan intake and initiate nutrition consult as needed  - Instruct patient on self management of diabetes  Outcome: Progressing

## 2021-03-08 NOTE — PROGRESS NOTES
Cath:    Rota PCI to LAD. 7F slender in RCFA. Perclose. Excellent result. 3.5x18, posted Jose to 20ATM with 4.0mm NC. Dictation to follow.     STELLA RODAS

## 2021-03-08 NOTE — PROGRESS NOTES
Received report from cath nurse. Pt Lying supine in bed. R pedal pulse +3. BP and site assessment per post cath protocol. R groin soft, dressing CDI, bruising from prior cath noted and marked. BP elevated, PRN Hydralazine given. Will continue to monitor.  C

## 2021-03-08 NOTE — PLAN OF CARE
PATIENT ALERT AND ORIENTED X4 ON ROOM AIR , DENIES ANY PAIN/ CHEST DISCOMFORT , PERIOD OF ANXIOUS ABOUT CARBIDOPA , REGULAR DOSE , NIGHTLY DOSE ALSO RECEIVED , FREQUENT ASSIST  AND COMFORT PROVIDED ,PLAN OF CARE UPDATED, NPO MAINTAINED AFTER MIDNIGHT FOR R Monitor electrolytes and administer replacement therapy as ordered  Outcome: Progressing     Problem: Diabetes/Glucose Control  Goal: Glucose maintained within prescribed range  Description: INTERVENTIONS:  - Monitor Blood Glucose as ordered  - Assess for

## 2021-03-08 NOTE — PAYOR COMM NOTE
--------------  ADMISSION REVIEW     Payor: Francis Li  Subscriber #:  [id number]  Authorization Number: [id number]    Admit date: 3/6/21  Admit time:  4:58 AM       Admitting Physician: Jenny Christie MD  Attending Physician:  Jenny Christie MD  [facility] Date   • HERNIA INGUINAL REPAIR ADULT Left 10/24/2016    Performed by Jasmin Zhang MD at [facility] MAIN OR   • OTHER      TURP                Social History    Tobacco Use      Smoking status: Former Smoker        Packs/day: 0.25        Years: 12.00        Pac Labs Reviewed - No data to display       EKG    Rate, intervals and axes as noted on EKG Report. Rate: 65  Rhythm: Sinus Rhythm  Reading: Inferior lateral ST depression, no STEMI    EKG    Rate, intervals and axes as noted on EKG Report.   Rate: 61  Rhyt 6/25/2020        ICD-10-CM Noted POA    * (Principal) NSTEMI (non-ST elevated myocardial infarction) ([facility] [address] I21.4 Unknown Unknown                         Signed by Anthony Daniels MD on 3/6/2021  4:16 AM            H&P - H&P Note      H&P signed JANELL       Social History:  reports that he quit smoking about 9 years ago. He has a 3.00 pack-year smoking history. He has never used smokeless tobacco. He reports current alcohol use of about 1.0 standard drinks of alcohol per week.  He reports that he do or gallops. Equal pulses. Chest and Back: No tenderness or deformity. Abdomen: Soft, nontender, nondistended. Positive bowel sounds. No rebound, guarding or organomegaly. Neurologic: No focal neurological deficits. CNII-XII grossly intact.   Musculoske aspirin EC tab 81 mg     Date Action Dose Route User    3/8/2021 0834 Given 81 mg Oral Norman Moctezuma RN      atorvastatin (LIPITOR) tab 40 mg     Date Action Dose Route User    3/7/2021 2023 Given 40 mg Oral Hannahvijaya Au RN      carbidopa-levodo 0.1.    Physical Exam:  General/Constitutional: [de-identified] appearing male presently in no acute distress.    HEENT:  Neck is supple.  There is  jugular venous distentionat 45 degree angle without hepatojugular reflux.  Carotids are 3/4 and equal bilaterally.    for PCI has been reviewed, including the potential need for emergent CABG. 3/6 CARDIOLOGY NOTE     CARDIAC CATHETERIZATION/PERCUTANEOUS CORONARY INTERVENTION REPORT     PREOPERATIVE DIAGNOSIS:  NSTEMI  POSTOPERATIVE DIAGNOSIS:  SAME.   PROCEDURE PERFORME    RECOMMENDATIONS:   DAPT for 12 months  Staged PCI of mid LAD        3/7 HOSPITALIST NOTE  Chief Complaint: CP     S: Patient doing well, had angiogram with stent placed yesterday, denies cp, sob.     Review of Systems:   10 point ROS completed and was above     Quality:  · DVT Prophylaxis: as above  · CODE status: Full  · Cuellar: no  · Central line: no  · If COVID testing is negative, may discontinue isolation: yes      Will the patient be referred to TCC on discharge?: no  Estimated date of discharge:  Jessa Valenzuela 6.1 4.6 5.7  --    HGB 13.4 13.3 13.1  --    .0 228.0 208.0  208.0 219.0         Chem:        Recent Labs   Lab 03/06/21  0136 03/06/21  0805 03/07/21  0447   * 137 137   K 4.2 4.2 4.2    104 103   CO2 29.0 27.0 28.0   BUN 17 16 15   CRE

## 2021-03-08 NOTE — PROGRESS NOTES
[facility] Cardiology Progress Note        Edythe Redo Patient Status:  Inpatient    1940 MRN [id number]   [facility] 8NE-A Attending Vivek Gallegos MD   Hosp Day # 2 PCP Elvira Marte MD     Subjective:  No 1. 05 1.02 0.84   CA 9.6 9.2 8.8   * 114* 101*       Recent Labs   Lab 03/06/21  0136   ALT 14*   AST 14*   ALB 3.7       Recent Labs   Lab 03/06/21  0136 03/06/21  0805   TROP <0.045 0.101*       No results for input(s): PTP, INR in the last 168 courtney

## 2021-03-09 VITALS
SYSTOLIC BLOOD PRESSURE: 135 MMHG | TEMPERATURE: 98 F | WEIGHT: 140 LBS | BODY MASS INDEX: 21 KG/M2 | OXYGEN SATURATION: 98 % | RESPIRATION RATE: 18 BRPM | HEART RATE: 70 BPM | DIASTOLIC BLOOD PRESSURE: 66 MMHG

## 2021-03-09 LAB
ANION GAP SERPL CALC-SCNC: 5 MMOL/L (ref 0–18)
BUN BLD-MCNC: 13 MG/DL (ref 7–18)
BUN/CREAT SERPL: 16.9 (ref 10–20)
CALCIUM BLD-MCNC: 9.1 MG/DL (ref 8.5–10.1)
CHLORIDE SERPL-SCNC: 101 MMOL/L (ref 98–112)
CO2 SERPL-SCNC: 27 MMOL/L (ref 21–32)
CREAT BLD-MCNC: 0.77 MG/DL
DEPRECATED RDW RBC AUTO: 45.1 FL (ref 35.1–46.3)
ERYTHROCYTE [DISTWIDTH] IN BLOOD BY AUTOMATED COUNT: 14.4 % (ref 11–15)
GLUCOSE BLD-MCNC: 84 MG/DL (ref 70–99)
HCT VFR BLD AUTO: 40.3 %
HGB BLD-MCNC: 12.8 G/DL
MCH RBC QN AUTO: 27.6 PG (ref 26–34)
MCHC RBC AUTO-ENTMCNC: 31.8 G/DL (ref 31–37)
MCV RBC AUTO: 87 FL
OSMOLALITY SERPL CALC.SUM OF ELEC: 275 MOSM/KG (ref 275–295)
PLATELET # BLD AUTO: 203 10(3)UL (ref 150–450)
PLATELET # BLD AUTO: 203 10(3)UL (ref 150–450)
POTASSIUM SERPL-SCNC: 4.3 MMOL/L (ref 3.5–5.1)
RBC # BLD AUTO: 4.63 X10(6)UL
SODIUM SERPL-SCNC: 133 MMOL/L (ref 136–145)
WBC # BLD AUTO: 8.1 X10(3) UL (ref 4–11)

## 2021-03-09 PROCEDURE — 99239 HOSP IP/OBS DSCHRG MGMT >30: CPT | Performed by: HOSPITALIST

## 2021-03-09 RX ORDER — LISINOPRIL 10 MG/1
10 TABLET ORAL DAILY
Qty: 90 TABLET | Refills: 3 | Status: SHIPPED | OUTPATIENT
Start: 2021-03-10 | End: 2021-03-19

## 2021-03-09 RX ORDER — ATORVASTATIN CALCIUM 40 MG/1
40 TABLET, FILM COATED ORAL NIGHTLY
Qty: 90 TABLET | Refills: 3 | Status: SHIPPED | OUTPATIENT
Start: 2021-03-09 | End: 2021-03-30

## 2021-03-09 RX ORDER — ASPIRIN 81 MG/1
81 TABLET ORAL DAILY
Refills: 0 | Status: SHIPPED | COMMUNITY
Start: 2021-03-10

## 2021-03-09 NOTE — CARDIAC REHAB
MI/CAD education completed with pt, both stent cards given, offered phase 2 CR but pt declined setting up appt now.

## 2021-03-09 NOTE — CM/SW NOTE
Jorge Alberto savings card and information packet provided to RN to give to pt. Home health agency information placed on pt's discharge summary. No other discharge needs identified at this time.     Susie Mendes, MSW, LSW   / Discharge Planner

## 2021-03-09 NOTE — PROCEDURES
[facility]    PATIENT'S NAME: Tiesha Munguia   ATTENDING PHYSICIAN: Elza Wheat M.D. OPERATING PHYSICIAN: Cally Fisher.  Carolina May MD   PATIENT ACCOUNT#:   [id number]    LOCATION:  [address] [place] [address]  MEDICAL RECORD #:   [id number]       DATE OF BIRTH:  [birth year] atherectomy. The patient will be returned to the telemetry galindo and will follow under the direction of Dr. Lanette Gutierrez. He will need dual antiplatelet therapy optimally for 1 year.   Aggressive medical management of underlying atherosclerosis is indic

## 2021-03-09 NOTE — PLAN OF CARE
Patient alert and oriented x4 , on room air , s/p left singh cath , rt groin with same blanca and discoloration , no swelling ,no hematoma, frequent assessed and assisted to chair/ bed, denies any pain, ambulated on hallway sinus on tele, plan of care disc Problem: Diabetes/Glucose Control  Goal: Glucose maintained within prescribed range  Description: INTERVENTIONS:  - Monitor Blood Glucose as ordered  - Assess for signs and symptoms of hyperglycemia and hypoglycemia  - Administer ordered medications to m

## 2021-03-09 NOTE — PAYOR COMM NOTE
--------------  DISCHARGE REVIEW    Payor: William Tabares  Subscriber #:  [id number]  Authorization Number: [id number]    Admit date: 3/6/21  Admit time:   4:58 AM  Discharge Date: 3/9/2021 12:11 PM     Admitting Physician: Meagan Rahman MD  Attending Ph

## 2021-03-09 NOTE — PROGRESS NOTES
[facility] Cardiology Progress Note        Norah Woodall Patient Status:  Inpatient    1940 MRN [id number]   [facility] 8NE-A Attending America Montague MD   Hosp Day # 3 PCP Елена Gautam MD     Subjective:  No 03/06/21  0136 03/06/21  0805 03/07/21  0447 03/09/21  0457   * 137 137 133*   K 4.2 4.2 4.2 4.3    104 103 101   CO2 29.0 27.0 28.0 27.0   BUN 17 16 15 13   CREATSERUM 1.05 1.02 0.84 0.77   CA 9.6 9.2 8.8 9.1   * 114* 101* 84       Rece

## 2021-03-09 NOTE — PLAN OF CARE
Pt discharged home. IV removed, tele dc'd and returned to monitor tech. F/U instructions provided and discussed. Pt and family verbalized understanding. Discussed adverse reactions and side effects of all new medication and provided appropriate handouts.  P arrhythmias  - Monitor electrolytes and administer replacement therapy as ordered  Outcome: Adequate for Discharge     Problem: Diabetes/Glucose Control  Goal: Glucose maintained within prescribed range  Description: INTERVENTIONS:  - Monitor Blood Glucose

## 2021-03-09 NOTE — PROGRESS NOTES
IVA HOSPITALIST  Progress Note     Jasmin Beverage Patient Status:  Inpatient    1940 MRN [id number]   [facility] 8NE-A Attending Hakeem Carson MD   Hosp Day # 3 PCP Whitley Dykes MD     Chief Complaint: CP    S: Patient doing wel cannot be calculated (Unknown ideal weight.). No results for input(s): PTP, INR in the last 168 hours. Recent Labs   Lab 03/06/21  0136 03/06/21  0805   TROP <0.045 0.101*            Imaging: Imaging data reviewed in Epic.     Medications:   • carbido

## 2021-03-11 ENCOUNTER — TELEPHONE (OUTPATIENT)
Dept: CARDIAC REHAB | Facility: HOSPITAL | Age: 81
End: 2021-03-11

## 2021-03-12 DIAGNOSIS — Z23 NEED FOR VACCINATION: ICD-10-CM

## 2021-03-19 PROBLEM — I25.10 CAD S/P PERCUTANEOUS CORONARY ANGIOPLASTY: Status: ACTIVE | Noted: 2021-03-19

## 2021-03-19 PROBLEM — Z98.61 CAD S/P PERCUTANEOUS CORONARY ANGIOPLASTY: Status: ACTIVE | Noted: 2021-03-19

## 2021-03-19 PROBLEM — I25.5 ISCHEMIC CARDIOMYOPATHY: Status: ACTIVE | Noted: 2021-03-19

## 2021-03-19 PROBLEM — E78.2 MIXED HYPERLIPIDEMIA: Status: ACTIVE | Noted: 2021-03-19

## 2021-03-29 ENCOUNTER — HOSPITAL ENCOUNTER (EMERGENCY)
Age: 81
Discharge: HOME | End: 2021-03-29
Attending: EMERGENCY MEDICINE
Payer: MEDICARE

## 2021-03-29 VITALS
RESPIRATION RATE: 16 BRPM | HEART RATE: 58 BPM | SYSTOLIC BLOOD PRESSURE: 117 MMHG | BODY MASS INDEX: 21.62 KG/M2 | DIASTOLIC BLOOD PRESSURE: 52 MMHG | WEIGHT: 146 LBS | HEIGHT: 69 IN | TEMPERATURE: 97 F | OXYGEN SATURATION: 100 %

## 2021-03-29 DIAGNOSIS — I83.899 BLEEDING FROM VARICOSE VEIN: Primary | ICD-10-CM

## 2021-03-29 PROCEDURE — 12001 RPR S/N/AX/GEN/TRNK 2.5CM/<: CPT

## 2021-03-29 PROCEDURE — 99283 EMERGENCY DEPT VISIT LOW MDM: CPT

## 2021-03-29 NOTE — ED PROVIDER NOTES
Patient Seen in: THE [facility] Emergency Department In [place]      History   Patient presents with:  Bleeding  Leg or Foot Injury    Stated Complaint: stats bleeding from both heels onset last night    HPI/Subjective:   HPI    Patient is an [de-identified] male night  Other systems are as noted in HPI. Constitutional and vital signs reviewed. All other systems reviewed and negative except as noted above.     Physical Exam     ED Triage Vitals [03/29/21 0917]   /52   Pulse 58   Resp 16   Temp 97.2 °F (3

## 2021-03-31 NOTE — DISCHARGE SUMMARY
IVA HOSPITALIST  DISCHARGE SUMMARY     Ryan Holbrook Patient Status:  Inpatient    1940 MRN [id number]   [facility] 8NE-A Attending No att. providers found   Hosp Day # 3 PCP Laurie Orosco MD     Date of Admission: 3/6/2021  Date times daily. Refills: 0     bisacodyl 10 MG Supp  Commonly known as: DULCOLAX      Place 10 mg rectally daily. Refills: 0     carbidopa-levodopa  MG Tabs  Commonly known as: SINEMET      Take 1 tablet by mouth 4 (four) times daily.  1 TABLET EVERY [place] [address] located at [address] [place] [address] [facility] [place], [address] [place] [address]. The facility is approximately 1/2 mile [address]  [place] of [address] [place] [address] on the [facility] side. &nbsp; The phone number is [phone].  Masks are required to be worn upon entering any Tr the safety of our patients, visitors and care team, we are asking patients not to bring anyone with them to their appointment, unless clinically required.         Location Instructions:     Melodie Reid [address] [place]               4/16/2021 10:20 AM

## 2021-04-10 ENCOUNTER — HOSPITAL ENCOUNTER (EMERGENCY)
Facility: HOSPITAL | Age: 81
Discharge: HOME | End: 2021-04-10
Attending: EMERGENCY MEDICINE
Payer: MEDICARE

## 2021-04-10 ENCOUNTER — APPOINTMENT (OUTPATIENT)
Dept: GENERAL RADIOLOGY | Facility: HOSPITAL | Age: 81
End: 2021-04-10
Attending: EMERGENCY MEDICINE
Payer: MEDICARE

## 2021-04-10 VITALS
BODY MASS INDEX: 20.73 KG/M2 | TEMPERATURE: 98 F | DIASTOLIC BLOOD PRESSURE: 63 MMHG | HEART RATE: 81 BPM | HEIGHT: 69 IN | SYSTOLIC BLOOD PRESSURE: 124 MMHG | OXYGEN SATURATION: 99 % | RESPIRATION RATE: 23 BRPM | WEIGHT: 140 LBS

## 2021-04-10 DIAGNOSIS — R50.9 FEVER, UNSPECIFIED FEVER CAUSE: Primary | ICD-10-CM

## 2021-04-10 PROCEDURE — 80053 COMPREHEN METABOLIC PANEL: CPT | Performed by: EMERGENCY MEDICINE

## 2021-04-10 PROCEDURE — 87040 BLOOD CULTURE FOR BACTERIA: CPT | Performed by: EMERGENCY MEDICINE

## 2021-04-10 PROCEDURE — 99285 EMERGENCY DEPT VISIT HI MDM: CPT

## 2021-04-10 PROCEDURE — 87798 DETECT AGENT NOS DNA AMP: CPT | Performed by: EMERGENCY MEDICINE

## 2021-04-10 PROCEDURE — 83605 ASSAY OF LACTIC ACID: CPT | Performed by: EMERGENCY MEDICINE

## 2021-04-10 PROCEDURE — 93005 ELECTROCARDIOGRAM TRACING: CPT

## 2021-04-10 PROCEDURE — 85025 COMPLETE CBC W/AUTO DIFF WBC: CPT | Performed by: EMERGENCY MEDICINE

## 2021-04-10 PROCEDURE — 96360 HYDRATION IV INFUSION INIT: CPT

## 2021-04-10 PROCEDURE — 71045 X-RAY EXAM CHEST 1 VIEW: CPT | Performed by: EMERGENCY MEDICINE

## 2021-04-10 PROCEDURE — 87999 UNLISTED MICROBIOLOGY PX: CPT

## 2021-04-10 PROCEDURE — 87502 INFLUENZA DNA AMP PROBE: CPT | Performed by: EMERGENCY MEDICINE

## 2021-04-10 PROCEDURE — 36415 COLL VENOUS BLD VENIPUNCTURE: CPT

## 2021-04-10 PROCEDURE — 93010 ELECTROCARDIOGRAM REPORT: CPT

## 2021-04-10 PROCEDURE — 99284 EMERGENCY DEPT VISIT MOD MDM: CPT

## 2021-04-10 PROCEDURE — 81003 URINALYSIS AUTO W/O SCOPE: CPT | Performed by: EMERGENCY MEDICINE

## 2021-04-10 RX ORDER — ACETAMINOPHEN 500 MG
1000 TABLET ORAL ONCE
Status: COMPLETED | OUTPATIENT
Start: 2021-04-10 | End: 2021-04-10

## 2021-04-11 NOTE — ED PROVIDER NOTES
Patient Seen in: [facility] Emergency Department      History   Patient presents with:  Fever    Stated Complaint: fever    HPI/Subjective:   HPI    Patient presents with fever. The patient's wife gives most of the history.   She states they were ou Performed by Dominic Caballero MD at [facility] MAIN OR   • OTHER      TURP                Social History    Tobacco Use      Smoking status: Former Smoker        Packs/day: 0.25        Years: 12.00        Pack years: 3        Quit date: 10/18/2011        Years [facility] components within normal limits   CBC W/ DIFFERENTIAL - Abnormal; Notable for the following components:    HGB 12.4 (*)     HCT 38.5 (*)     Neutrophil Absolute Prelim 7.89 (*)     Neutrophil Absolute 7.89 (*)     Lymphocyte Absolute 0.53 (*)     All other (TYLENOL EXTRA STRENGTH) tab 1,000 mg (1,000 mg Oral Given 4/10/21 2119)     The patient was placed on normal saline for his complaint of weakness and chills. His temperature was monitored and ultimately did go up to 100.   He was given Tylenol per request

## 2021-04-22 ENCOUNTER — CARDPULM VISIT (OUTPATIENT)
Dept: CARDIAC REHAB | Facility: HOSPITAL | Age: 81
End: 2021-04-22
Attending: INTERNAL MEDICINE
Payer: MEDICARE

## 2021-04-22 VITALS
HEART RATE: 60 BPM | SYSTOLIC BLOOD PRESSURE: 100 MMHG | WEIGHT: 142.38 LBS | HEIGHT: 69.5 IN | RESPIRATION RATE: 20 BRPM | BODY MASS INDEX: 20.61 KG/M2 | DIASTOLIC BLOOD PRESSURE: 62 MMHG

## 2021-04-22 PROCEDURE — 93798 PHYS/QHP OP CAR RHAB W/ECG: CPT

## 2021-04-24 ENCOUNTER — HOSPITAL ENCOUNTER (OUTPATIENT)
Dept: ULTRASOUND IMAGING | Age: 81
Discharge: HOME | End: 2021-04-24
Attending: FAMILY MEDICINE
Payer: MEDICARE

## 2021-04-24 DIAGNOSIS — I10 ESSENTIAL HYPERTENSION: ICD-10-CM

## 2021-04-24 DIAGNOSIS — E78.00 PURE HYPERCHOLESTEROLEMIA: ICD-10-CM

## 2021-04-24 PROCEDURE — 93880 EXTRACRANIAL BILAT STUDY: CPT | Performed by: FAMILY MEDICINE

## 2021-04-29 ENCOUNTER — CARDPULM VISIT (OUTPATIENT)
Dept: CARDIAC REHAB | Facility: HOSPITAL | Age: 81
End: 2021-04-29
Attending: INTERNAL MEDICINE
Payer: MEDICARE

## 2021-04-29 PROCEDURE — 93798 PHYS/QHP OP CAR RHAB W/ECG: CPT

## 2021-04-30 ENCOUNTER — HOSPITAL ENCOUNTER (EMERGENCY)
Facility: HOSPITAL | Age: 81
Discharge: HOME | End: 2021-04-30
Attending: EMERGENCY MEDICINE
Payer: MEDICARE

## 2021-04-30 VITALS
SYSTOLIC BLOOD PRESSURE: 100 MMHG | DIASTOLIC BLOOD PRESSURE: 50 MMHG | RESPIRATION RATE: 16 BRPM | OXYGEN SATURATION: 97 % | HEART RATE: 80 BPM | TEMPERATURE: 97 F

## 2021-04-30 DIAGNOSIS — I83.899 BLEEDING FROM VARICOSE VEIN: Primary | ICD-10-CM

## 2021-04-30 PROCEDURE — 12001 RPR S/N/AX/GEN/TRNK 2.5CM/<: CPT

## 2021-04-30 PROCEDURE — 99283 EMERGENCY DEPT VISIT LOW MDM: CPT

## 2021-04-30 PROCEDURE — 99282 EMERGENCY DEPT VISIT SF MDM: CPT

## 2021-04-30 NOTE — ED PROVIDER NOTES
Patient Seen in: [facility] Emergency Department      History   Patient presents with:  Laceration/Abrasion    Stated Complaint: bleeding from leg     HPI/Subjective:   HPI    80-year-old male who presents to the emergency department stating he was HPI.  Constitutional and vital signs reviewed. All other systems reviewed and negative except as noted above.     Physical Exam     ED Triage Vitals [04/30/21 1114]   /50   Pulse 80   Resp 16   Temp 97.4 °F (36.3 °C)   Temp src Tympanic   SpO2 97

## 2021-04-30 NOTE — ED INITIAL ASSESSMENT (HPI)
Pt presents via ems complaint of bleeding from vein on l leg states he was putting on cream when he noted bleeding

## 2021-05-01 ENCOUNTER — APPOINTMENT (OUTPATIENT)
Dept: CARDIAC REHAB | Facility: HOSPITAL | Age: 81
End: 2021-05-01
Attending: INTERNAL MEDICINE
Payer: MEDICARE

## 2021-05-04 ENCOUNTER — CARDPULM VISIT (OUTPATIENT)
Dept: CARDIAC REHAB | Facility: HOSPITAL | Age: 81
End: 2021-05-04
Attending: INTERNAL MEDICINE
Payer: MEDICARE

## 2021-05-04 PROCEDURE — 93798 PHYS/QHP OP CAR RHAB W/ECG: CPT

## 2021-05-06 ENCOUNTER — CARDPULM VISIT (OUTPATIENT)
Dept: CARDIAC REHAB | Facility: HOSPITAL | Age: 81
End: 2021-05-06
Attending: INTERNAL MEDICINE
Payer: MEDICARE

## 2021-05-06 PROCEDURE — 93798 PHYS/QHP OP CAR RHAB W/ECG: CPT

## 2021-05-08 ENCOUNTER — CARDPULM VISIT (OUTPATIENT)
Dept: CARDIAC REHAB | Facility: HOSPITAL | Age: 81
End: 2021-05-08
Attending: INTERNAL MEDICINE
Payer: MEDICARE

## 2021-05-08 PROCEDURE — 93798 PHYS/QHP OP CAR RHAB W/ECG: CPT

## 2021-05-11 ENCOUNTER — CARDPULM VISIT (OUTPATIENT)
Dept: CARDIAC REHAB | Facility: HOSPITAL | Age: 81
End: 2021-05-11
Attending: INTERNAL MEDICINE
Payer: MEDICARE

## 2021-05-11 PROCEDURE — 93798 PHYS/QHP OP CAR RHAB W/ECG: CPT

## 2021-05-13 ENCOUNTER — CARDPULM VISIT (OUTPATIENT)
Dept: CARDIAC REHAB | Facility: HOSPITAL | Age: 81
End: 2021-05-13
Attending: INTERNAL MEDICINE
Payer: MEDICARE

## 2021-05-13 PROCEDURE — 93798 PHYS/QHP OP CAR RHAB W/ECG: CPT

## 2021-05-15 ENCOUNTER — CARDPULM VISIT (OUTPATIENT)
Dept: CARDIAC REHAB | Facility: HOSPITAL | Age: 81
End: 2021-05-15
Attending: INTERNAL MEDICINE
Payer: MEDICARE

## 2021-05-15 PROCEDURE — 93798 PHYS/QHP OP CAR RHAB W/ECG: CPT

## 2021-05-18 ENCOUNTER — CARDPULM VISIT (OUTPATIENT)
Dept: CARDIAC REHAB | Facility: HOSPITAL | Age: 81
End: 2021-05-18
Attending: INTERNAL MEDICINE
Payer: MEDICARE

## 2021-05-18 PROCEDURE — 93798 PHYS/QHP OP CAR RHAB W/ECG: CPT

## 2021-05-20 ENCOUNTER — APPOINTMENT (OUTPATIENT)
Dept: CARDIAC REHAB | Facility: HOSPITAL | Age: 81
End: 2021-05-20
Attending: INTERNAL MEDICINE
Payer: MEDICARE

## 2021-05-22 ENCOUNTER — CARDPULM VISIT (OUTPATIENT)
Dept: CARDIAC REHAB | Facility: HOSPITAL | Age: 81
End: 2021-05-22
Attending: INTERNAL MEDICINE
Payer: MEDICARE

## 2021-05-22 PROCEDURE — 93798 PHYS/QHP OP CAR RHAB W/ECG: CPT

## 2021-05-25 ENCOUNTER — CARDPULM VISIT (OUTPATIENT)
Dept: CARDIAC REHAB | Facility: HOSPITAL | Age: 81
End: 2021-05-25
Attending: INTERNAL MEDICINE
Payer: MEDICARE

## 2021-05-25 PROCEDURE — 93798 PHYS/QHP OP CAR RHAB W/ECG: CPT

## 2021-05-27 ENCOUNTER — APPOINTMENT (OUTPATIENT)
Dept: CARDIAC REHAB | Facility: HOSPITAL | Age: 81
End: 2021-05-27
Attending: INTERNAL MEDICINE
Payer: MEDICARE

## 2021-05-29 ENCOUNTER — CARDPULM VISIT (OUTPATIENT)
Dept: CARDIAC REHAB | Facility: HOSPITAL | Age: 81
End: 2021-05-29
Attending: INTERNAL MEDICINE
Payer: MEDICARE

## 2021-05-29 PROCEDURE — 93798 PHYS/QHP OP CAR RHAB W/ECG: CPT

## 2021-06-01 ENCOUNTER — CARDPULM VISIT (OUTPATIENT)
Dept: CARDIAC REHAB | Facility: HOSPITAL | Age: 81
End: 2021-06-01
Attending: INTERNAL MEDICINE
Payer: MEDICARE

## 2021-06-01 PROCEDURE — 93798 PHYS/QHP OP CAR RHAB W/ECG: CPT

## 2021-06-03 ENCOUNTER — APPOINTMENT (OUTPATIENT)
Dept: CARDIAC REHAB | Facility: HOSPITAL | Age: 81
End: 2021-06-03
Attending: INTERNAL MEDICINE
Payer: MEDICARE

## 2021-06-08 ENCOUNTER — APPOINTMENT (OUTPATIENT)
Dept: CARDIAC REHAB | Facility: HOSPITAL | Age: 81
End: 2021-06-08
Attending: INTERNAL MEDICINE
Payer: MEDICARE

## 2021-06-10 ENCOUNTER — APPOINTMENT (OUTPATIENT)
Dept: CARDIAC REHAB | Facility: HOSPITAL | Age: 81
End: 2021-06-10
Attending: INTERNAL MEDICINE
Payer: MEDICARE

## 2021-06-12 ENCOUNTER — APPOINTMENT (OUTPATIENT)
Dept: CARDIAC REHAB | Facility: HOSPITAL | Age: 81
End: 2021-06-12
Payer: MEDICARE

## 2021-06-15 ENCOUNTER — APPOINTMENT (OUTPATIENT)
Dept: CARDIAC REHAB | Facility: HOSPITAL | Age: 81
End: 2021-06-15
Attending: INTERNAL MEDICINE
Payer: MEDICARE

## 2021-06-17 ENCOUNTER — APPOINTMENT (OUTPATIENT)
Dept: CARDIAC REHAB | Facility: HOSPITAL | Age: 81
End: 2021-06-17
Attending: INTERNAL MEDICINE
Payer: MEDICARE

## 2021-06-19 ENCOUNTER — CARDPULM VISIT (OUTPATIENT)
Dept: CARDIAC REHAB | Facility: HOSPITAL | Age: 81
End: 2021-06-19
Attending: INTERNAL MEDICINE
Payer: MEDICARE

## 2021-06-19 PROCEDURE — 93798 PHYS/QHP OP CAR RHAB W/ECG: CPT

## 2021-06-22 ENCOUNTER — CARDPULM VISIT (OUTPATIENT)
Dept: CARDIAC REHAB | Facility: HOSPITAL | Age: 81
End: 2021-06-22
Attending: INTERNAL MEDICINE
Payer: MEDICARE

## 2021-06-22 PROCEDURE — 93798 PHYS/QHP OP CAR RHAB W/ECG: CPT

## 2021-06-24 ENCOUNTER — ORDER TRANSCRIPTION (OUTPATIENT)
Dept: ADMINISTRATIVE | Facility: HOSPITAL | Age: 81
End: 2021-06-24

## 2021-06-24 ENCOUNTER — APPOINTMENT (OUTPATIENT)
Dept: CARDIAC REHAB | Facility: HOSPITAL | Age: 81
End: 2021-06-24
Attending: INTERNAL MEDICINE
Payer: MEDICARE

## 2021-06-24 DIAGNOSIS — Z11.59 ENCOUNTER FOR SCREENING FOR OTHER VIRAL DISEASES: ICD-10-CM

## 2021-06-24 DIAGNOSIS — Z01.818 PREOP EXAMINATION: Primary | ICD-10-CM

## 2021-06-26 ENCOUNTER — CARDPULM VISIT (OUTPATIENT)
Dept: CARDIAC REHAB | Facility: HOSPITAL | Age: 81
End: 2021-06-26
Attending: INTERNAL MEDICINE
Payer: MEDICARE

## 2021-06-26 PROCEDURE — 93798 PHYS/QHP OP CAR RHAB W/ECG: CPT

## 2021-06-29 ENCOUNTER — CARDPULM VISIT (OUTPATIENT)
Dept: CARDIAC REHAB | Facility: HOSPITAL | Age: 81
End: 2021-06-29
Attending: INTERNAL MEDICINE
Payer: MEDICARE

## 2021-06-29 PROCEDURE — 93798 PHYS/QHP OP CAR RHAB W/ECG: CPT

## 2021-07-01 ENCOUNTER — CARDPULM VISIT (OUTPATIENT)
Dept: CARDIAC REHAB | Facility: HOSPITAL | Age: 81
End: 2021-07-01
Attending: INTERNAL MEDICINE
Payer: MEDICARE

## 2021-07-01 PROCEDURE — 93798 PHYS/QHP OP CAR RHAB W/ECG: CPT

## 2021-07-03 ENCOUNTER — APPOINTMENT (OUTPATIENT)
Dept: CARDIAC REHAB | Facility: HOSPITAL | Age: 81
End: 2021-07-03
Payer: MEDICARE

## 2021-07-06 ENCOUNTER — CARDPULM VISIT (OUTPATIENT)
Dept: CARDIAC REHAB | Facility: HOSPITAL | Age: 81
End: 2021-07-06
Attending: INTERNAL MEDICINE
Payer: MEDICARE

## 2021-07-06 PROCEDURE — 93798 PHYS/QHP OP CAR RHAB W/ECG: CPT

## 2021-07-08 ENCOUNTER — APPOINTMENT (OUTPATIENT)
Dept: CARDIAC REHAB | Facility: HOSPITAL | Age: 81
End: 2021-07-08
Attending: INTERNAL MEDICINE
Payer: MEDICARE

## 2021-07-13 ENCOUNTER — APPOINTMENT (OUTPATIENT)
Dept: CARDIAC REHAB | Facility: HOSPITAL | Age: 81
End: 2021-07-13
Attending: INTERNAL MEDICINE
Payer: MEDICARE

## 2021-07-14 ENCOUNTER — HOSPITAL ENCOUNTER (EMERGENCY)
Facility: HOSPITAL | Age: 81
Discharge: HOME | End: 2021-07-14
Attending: EMERGENCY MEDICINE
Payer: MEDICARE

## 2021-07-14 VITALS
HEART RATE: 62 BPM | OXYGEN SATURATION: 100 % | BODY MASS INDEX: 20.44 KG/M2 | WEIGHT: 138 LBS | HEIGHT: 69 IN | DIASTOLIC BLOOD PRESSURE: 63 MMHG | SYSTOLIC BLOOD PRESSURE: 108 MMHG | RESPIRATION RATE: 18 BRPM

## 2021-07-14 DIAGNOSIS — I83.899 BLEEDING FROM VARICOSE VEIN: Primary | ICD-10-CM

## 2021-07-14 PROCEDURE — 99283 EMERGENCY DEPT VISIT LOW MDM: CPT

## 2021-07-14 NOTE — ED PROVIDER NOTES
Patient Seen in: [facility] Emergency Department      History   Patient presents with:  Bleeding    Stated Complaint: bleeding to leg now controlled    HPI/Subjective:   HPI    Patient is a 80-year-old male who states that he recently had varicose v History    Tobacco Use      Smoking status: Former Smoker        Packs/day: 0.25        Years: 12.00        Pack years: 3        Quit date: 10/18/2011        Years since quittin.7      Smokeless tobacco: Never Used    Alcohol use: Not Currently      Al Clinical Impression:  Bleeding from varicose vein  (primary encounter diagnosis)     Disposition:  Discharge  7/14/2021 12:35 pm    Follow-up:  Elle Sanchez, [address] [place] [address] H [phone]    In 2 days      Cynthia Lynn

## 2021-07-14 NOTE — ED QUICK NOTES
Dressing applied and patient's compression stockings applied at patient's request. Patient ambulatory to bathroom. No bleeding noted at this time. Patient and family member educated on dressing and wound care.  Patient and wife still concerned about bleedin

## 2021-07-14 NOTE — ED INITIAL ASSESSMENT (HPI)
Patient presents for evaluation of bleeding from right lower extremity. He reports recent procedure last week on opposite leg and was started on brilinta and eliquis. He denies injury.  Bleeding controlled on arrival.

## 2021-07-15 ENCOUNTER — APPOINTMENT (OUTPATIENT)
Dept: CARDIAC REHAB | Facility: HOSPITAL | Age: 81
End: 2021-07-15
Attending: INTERNAL MEDICINE
Payer: MEDICARE

## 2021-07-16 ENCOUNTER — HOSPITAL ENCOUNTER (EMERGENCY)
Age: 81
Discharge: HOME | End: 2021-07-16
Attending: EMERGENCY MEDICINE
Payer: MEDICARE

## 2021-07-16 ENCOUNTER — HOSPITAL ENCOUNTER (EMERGENCY)
Facility: HOSPITAL | Age: 81
Discharge: LEFT BEFORE DISCHARGE | End: 2021-07-16
Payer: MEDICARE

## 2021-07-16 VITALS
HEART RATE: 77 BPM | BODY MASS INDEX: 20.44 KG/M2 | RESPIRATION RATE: 16 BRPM | OXYGEN SATURATION: 90 % | SYSTOLIC BLOOD PRESSURE: 180 MMHG | WEIGHT: 138 LBS | DIASTOLIC BLOOD PRESSURE: 87 MMHG | TEMPERATURE: 98 F | HEIGHT: 69 IN

## 2021-07-16 DIAGNOSIS — K14.8 TONGUE LESION: ICD-10-CM

## 2021-07-16 DIAGNOSIS — Z79.01 ANTICOAGULATED: Primary | ICD-10-CM

## 2021-07-16 PROCEDURE — 99282 EMERGENCY DEPT VISIT SF MDM: CPT

## 2021-07-16 NOTE — ED PROVIDER NOTES
Dr Correa spoke to pt. Discharge instructions given to pt. Verbalize understanding.   Patient Seen in: THE [facility] Emergency Department In [place]      History   Patient presents with:  Bleeding    Stated Complaint: bleeding from mouth.  pt on blood thinners     HPI/Subjective:   HPI    80-year-old male who had ablation of the left greater s Surgeon: Mariano Pulido MD;  Location: [address] [place] [address] OR   • OTHER      TURP   • STENT, COATED/COV W/DEL SYS Left 03/09/2021   • STENT, COATED/COV W/DEL SYS Right                 Social History    Tobacco Use      Smoking status: Former Smoker        Packs/day: including notes from cardiology and vascular surgery      I have discussed with the patient the results of test, differential diagnosis, treatment plan, warning signs and symptoms which should prompt immediate return.   They expressed understanding of these

## 2021-07-16 NOTE — ED INITIAL ASSESSMENT (HPI)
Pt c/o bleeding from his mouth. Pt was placed on 2 different blood thinners from separate physicians.  No other complaints

## 2021-07-19 ENCOUNTER — APPOINTMENT (OUTPATIENT)
Dept: GENERAL RADIOLOGY | Age: 81
End: 2021-07-19
Attending: EMERGENCY MEDICINE
Payer: MEDICARE

## 2021-07-19 ENCOUNTER — HOSPITAL ENCOUNTER (EMERGENCY)
Age: 81
Discharge: HOME | End: 2021-07-19
Attending: EMERGENCY MEDICINE
Payer: MEDICARE

## 2021-07-19 ENCOUNTER — APPOINTMENT (OUTPATIENT)
Dept: CT IMAGING | Age: 81
End: 2021-07-19
Attending: EMERGENCY MEDICINE
Payer: MEDICARE

## 2021-07-19 VITALS
RESPIRATION RATE: 12 BRPM | SYSTOLIC BLOOD PRESSURE: 136 MMHG | WEIGHT: 138 LBS | HEIGHT: 69 IN | BODY MASS INDEX: 20.44 KG/M2 | DIASTOLIC BLOOD PRESSURE: 64 MMHG | HEART RATE: 62 BPM | OXYGEN SATURATION: 97 % | TEMPERATURE: 98 F
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
## 2021-07-19 DIAGNOSIS — S70.02XA CONTUSION OF LEFT HIP, INITIAL ENCOUNTER: Primary | ICD-10-CM
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
## 2021-07-19 PROCEDURE — 99284 EMERGENCY DEPT VISIT MOD MDM: CPT

## 2021-07-19 PROCEDURE — 70450 CT HEAD/BRAIN W/O DYE: CPT | Performed by: EMERGENCY MEDICINE

## 2021-07-19 PROCEDURE — 73502 X-RAY EXAM HIP UNI 2-3 VIEWS: CPT | Performed by: EMERGENCY MEDICINE

## 2021-07-19 NOTE — ED INITIAL ASSESSMENT (HPI)
Trip and fall today, landed on buttocks and lower back to hard floor, on blood thinners, hit back of head on the floor, denies loc

## 2021-07-19 NOTE — ED PROVIDER NOTES
Patient Seen in: THE [facility] Emergency Department In [place]      History   Patient presents with:  Fall  Back Pain    Stated Complaint: fell off chair lower back and butt pain    HPI/Subjective:   HPI    Patient is anticoagulated with trauma after he fell REPAIR Left 10/24/2016    Procedure:  HERNIA INGUINAL REPAIR ADULT;  Surgeon: Dominic Caballero MD;  Location: [address] [place] [address] OR   • OTHER      TURP   • STENT, COATED/COV W/DEL SYS Left 03/09/2021   • STENT, COATED/COV W/DEL SYS Right                 Social History Cardiovascular:      Rate and Rhythm: Normal rate and regular rhythm. Heart sounds: Normal heart sounds. No murmur heard. No friction rub. No gallop. Pulmonary:      Effort: Pulmonary effort is normal. No respiratory distress.       Breath sound 7/19/2021 at 4:08 PM         Finalized by (CST): Dk Beavers MD on 7/19/2021 at 4:09 PM        CT BRAIN OR HEAD ([id number])   Final Result    PROCEDURE:  CT BRAIN OR HEAD ([id number])         COMPARISON:  None.          INDICATIONS:  fell off chair lower back that he is on Brilinta and Eliquis and hit his head if it was only briefly and gently, does necessitate a head CT.   Described this to the family did express understanding thankfully CT of the brain is negative x-rays of the pelvis and hip are also negative

## 2021-07-20 ENCOUNTER — APPOINTMENT (OUTPATIENT)
Dept: CARDIAC REHAB | Facility: HOSPITAL | Age: 81
End: 2021-07-20
Attending: INTERNAL MEDICINE
Payer: MEDICARE

## 2021-09-07 ENCOUNTER — HOSPITAL ENCOUNTER (OUTPATIENT)
Dept: CT IMAGING | Age: 81
Discharge: HOME | End: 2021-09-07
Attending: INTERNAL MEDICINE
Payer: MEDICARE

## 2021-09-07 DIAGNOSIS — R05.9 COUGH: ICD-10-CM

## 2021-09-07 PROCEDURE — 71250 CT THORAX DX C-: CPT | Performed by: INTERNAL MEDICINE

## 2021-09-23 ENCOUNTER — HOSPITAL ENCOUNTER (OUTPATIENT)
Dept: NUCLEAR MEDICINE | Facility: HOSPITAL | Age: 81
Discharge: HOME | End: 2021-09-23
Attending: INTERNAL MEDICINE
Payer: MEDICARE

## 2021-09-23 DIAGNOSIS — R93.89 ABNORMAL CT OF THE CHEST: ICD-10-CM

## 2021-09-23 LAB — GLUCOSE BLD-MCNC: 93 MG/DL (ref 70–99)

## 2021-09-23 PROCEDURE — 82962 GLUCOSE BLOOD TEST: CPT

## 2021-09-23 PROCEDURE — 78815 PET IMAGE W/CT SKULL-THIGH: CPT | Performed by: INTERNAL MEDICINE

## 2021-09-29 ENCOUNTER — HOSPITAL ENCOUNTER (EMERGENCY)
Age: 81
Discharge: HOME | End: 2021-09-29
Attending: EMERGENCY MEDICINE
Payer: MEDICARE

## 2021-09-29 ENCOUNTER — APPOINTMENT (OUTPATIENT)
Dept: CT IMAGING | Age: 81
End: 2021-09-29
Attending: EMERGENCY MEDICINE
Payer: MEDICARE

## 2021-09-29 ENCOUNTER — APPOINTMENT (OUTPATIENT)
Dept: GENERAL RADIOLOGY | Age: 81
End: 2021-09-29
Attending: EMERGENCY MEDICINE
Payer: MEDICARE

## 2021-09-29 VITALS
RESPIRATION RATE: 12 BRPM | HEART RATE: 65 BPM | BODY MASS INDEX: 20.44 KG/M2 | DIASTOLIC BLOOD PRESSURE: 52 MMHG | HEIGHT: 69 IN | SYSTOLIC BLOOD PRESSURE: 97 MMHG | OXYGEN SATURATION: 100 % | WEIGHT: 138 LBS | TEMPERATURE: 97 F
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
## 2021-09-29 PROCEDURE — 99284 EMERGENCY DEPT VISIT MOD MDM: CPT

## 2021-09-29 PROCEDURE — 73502 X-RAY EXAM HIP UNI 2-3 VIEWS: CPT | Performed by: EMERGENCY MEDICINE

## 2021-09-29 PROCEDURE — 70450 CT HEAD/BRAIN W/O DYE: CPT | Performed by: EMERGENCY MEDICINE

## 2021-09-29 NOTE — ED QUICK NOTES
Pt dressed and ready to go, standing in the hallway. Wife seems upset about the wait time for discharge paperwork.

## 2021-09-29 NOTE — ED PROVIDER NOTES
Patient Seen in: THE [facility] Emergency Department In [place]      History   Patient presents with:  Fall    Stated Complaint: lost his balance- fell last night. c/o right low back pain, denies of hitting h*    Subjective:   HPI    Patient is 80year-old ma Former Smoker        Packs/day: 0.25        Years: 12.00        Pack years: 3        Quit date: 10/18/2011        Years since quittin.9      Smokeless tobacco: Never Used    Vaping Use      Vaping Use: Never used    Alcohol use: Not Currently      [place]co time.      Motor: No abnormal muscle tone.       Coordination: Coordination normal.   Psychiatric:         Behavior: Behavior normal.              ED Course   Labs Reviewed - No data to display        XR HIP W OR WO PELVIS 2 OR 3 VIEWS, RIGHT (CPT=73502) consistent with chronic small vessel ischemic     change. There is no evidence of acute ischemia, hemorrhage or mass.     SINUSES:           There is complete opacification of the maxillary     sinuses bilaterally and extensive mucosal thickening in the ri

## 2021-10-11 ENCOUNTER — HOSPITAL ENCOUNTER (EMERGENCY)
Age: 81
Discharge: HOME | End: 2021-10-11
Attending: EMERGENCY MEDICINE
Payer: MEDICARE

## 2021-10-11 VITALS
HEIGHT: 69 IN | WEIGHT: 138 LBS | HEART RATE: 61 BPM | OXYGEN SATURATION: 100 % | DIASTOLIC BLOOD PRESSURE: 76 MMHG | RESPIRATION RATE: 16 BRPM | BODY MASS INDEX: 20.44 KG/M2 | SYSTOLIC BLOOD PRESSURE: 167 MMHG

## 2021-10-11 DIAGNOSIS — R09.89 LABILE BLOOD PRESSURE: Primary | ICD-10-CM

## 2021-10-11 PROCEDURE — 99284 EMERGENCY DEPT VISIT MOD MDM: CPT

## 2021-10-11 PROCEDURE — 80053 COMPREHEN METABOLIC PANEL: CPT | Performed by: EMERGENCY MEDICINE

## 2021-10-11 PROCEDURE — 85025 COMPLETE CBC W/AUTO DIFF WBC: CPT | Performed by: EMERGENCY MEDICINE

## 2021-10-11 PROCEDURE — 93005 ELECTROCARDIOGRAM TRACING: CPT

## 2021-10-11 PROCEDURE — 93010 ELECTROCARDIOGRAM REPORT: CPT

## 2021-10-11 PROCEDURE — 36415 COLL VENOUS BLD VENIPUNCTURE: CPT

## 2021-10-11 NOTE — ED INITIAL ASSESSMENT (HPI)
Pt here with wife who reports pt has felt dizzy and weak yesterday and today, pt checked BP yesterday and this morning and was low. Pt ate some salty foods then checked this evening and BP was high at 212/106.   Wife gave metoprolol and lisinopril early at

## 2021-10-11 NOTE — ED PROVIDER NOTES
Patient Seen in: THE [facility] Emergency Department In [place]      History   Patient presents with:  Hypertension  Dizziness    Stated Complaint: HTN    Subjective:   HPI    66-year-old male comes to the emergency department with his wife for evaluation of Wears glasses     reading glasses               Past Surgical History:   Procedure Laterality Date   • ANGIOGRAM     • ANGIOPLASTY (CORONARY)     • INGUINAL HERNIA REPAIR Left 10/24/2016    Procedure:  HERNIA INGUINAL REPAIR ADULT;  Surgeon: Bernardino Hobbs, Labs Reviewed   COMP METABOLIC PANEL (14) - Abnormal; Notable for the following components:       Result Value    Glucose 105 (*)     Sodium 133 (*)     ALT 7 (*)     All other components within normal limits   CBC W/ DIFFERENTIAL - Abnormal; Notable f

## 2021-10-26 ENCOUNTER — APPOINTMENT (OUTPATIENT)
Dept: GENERAL RADIOLOGY | Facility: HOSPITAL | Age: 81
DRG: 247 | End: 2021-10-26
Attending: EMERGENCY MEDICINE
Payer: MEDICARE

## 2021-10-26 ENCOUNTER — APPOINTMENT (OUTPATIENT)
Dept: INTERVENTIONAL RADIOLOGY/VASCULAR | Facility: HOSPITAL | Age: 81
DRG: 247 | End: 2021-10-26
Attending: INTERNAL MEDICINE
Payer: MEDICARE

## 2021-10-26 ENCOUNTER — HOSPITAL ENCOUNTER (INPATIENT)
Facility: HOSPITAL | Age: 81
LOS: 2 days | Discharge: HOME | DRG: 247 | End: 2021-10-28
Attending: EMERGENCY MEDICINE | Admitting: HOSPITALIST
Payer: MEDICARE

## 2021-10-26 ENCOUNTER — APPOINTMENT (OUTPATIENT)
Dept: CT IMAGING | Facility: HOSPITAL | Age: 81
DRG: 247 | End: 2021-10-26
Attending: EMERGENCY MEDICINE
Payer: MEDICARE

## 2021-10-26 DIAGNOSIS — I20.0 UNSTABLE ANGINA (HCC): Primary | ICD-10-CM

## 2021-10-26 PROBLEM — R73.9 HYPERGLYCEMIA: Status: ACTIVE | Noted: 2021-10-26

## 2021-10-26 PROBLEM — R73.9 HYPERGLYCEMIA: Status: ACTIVE | Noted: 2021-01-01

## 2021-10-26 PROBLEM — E87.1 HYPONATREMIA: Status: ACTIVE | Noted: 2021-10-26

## 2021-10-26 PROBLEM — E87.1 HYPONATREMIA: Status: ACTIVE | Noted: 2021-01-01

## 2021-10-26 PROCEDURE — B240ZZ3 ULTRASONOGRAPHY OF SINGLE CORONARY ARTERY, INTRAVASCULAR: ICD-10-PCS | Performed by: INTERNAL MEDICINE

## 2021-10-26 PROCEDURE — B41F1ZZ FLUOROSCOPY OF RIGHT LOWER EXTREMITY ARTERIES USING LOW OSMOLAR CONTRAST: ICD-10-PCS | Performed by: INTERNAL MEDICINE

## 2021-10-26 PROCEDURE — 99223 1ST HOSP IP/OBS HIGH 75: CPT | Performed by: HOSPITALIST

## 2021-10-26 PROCEDURE — 4A023N7 MEASUREMENT OF CARDIAC SAMPLING AND PRESSURE, LEFT HEART, PERCUTANEOUS APPROACH: ICD-10-PCS | Performed by: INTERNAL MEDICINE

## 2021-10-26 PROCEDURE — 71275 CT ANGIOGRAPHY CHEST: CPT | Performed by: EMERGENCY MEDICINE

## 2021-10-26 PROCEDURE — B2111ZZ FLUOROSCOPY OF MULTIPLE CORONARY ARTERIES USING LOW OSMOLAR CONTRAST: ICD-10-PCS | Performed by: INTERNAL MEDICINE

## 2021-10-26 PROCEDURE — 71045 X-RAY EXAM CHEST 1 VIEW: CPT | Performed by: EMERGENCY MEDICINE

## 2021-10-26 PROCEDURE — B2151ZZ FLUOROSCOPY OF LEFT HEART USING LOW OSMOLAR CONTRAST: ICD-10-PCS | Performed by: INTERNAL MEDICINE

## 2021-10-26 PROCEDURE — 027035Z DILATION OF CORONARY ARTERY, ONE ARTERY WITH TWO DRUG-ELUTING INTRALUMINAL DEVICES, PERCUTANEOUS APPROACH: ICD-10-PCS | Performed by: INTERNAL MEDICINE

## 2021-10-26 RX ORDER — HEPARIN SODIUM AND DEXTROSE 10000; 5 [USP'U]/100ML; G/100ML
12 INJECTION INTRAVENOUS ONCE
Status: COMPLETED | OUTPATIENT
Start: 2021-10-26 | End: 2021-10-26

## 2021-10-26 RX ORDER — ONDANSETRON 2 MG/ML
4 INJECTION INTRAMUSCULAR; INTRAVENOUS EVERY 6 HOURS PRN
Status: DISCONTINUED | OUTPATIENT
Start: 2021-10-26 | End: 2021-10-28

## 2021-10-26 RX ORDER — AMANTADINE HYDROCHLORIDE 100 MG/1
100 TABLET ORAL 2 TIMES DAILY
Status: DISCONTINUED | OUTPATIENT
Start: 2021-10-26 | End: 2021-10-28

## 2021-10-26 RX ORDER — CLOPIDOGREL BISULFATE 75 MG/1
75 TABLET ORAL DAILY
Status: DISCONTINUED | OUTPATIENT
Start: 2021-10-27 | End: 2021-10-26

## 2021-10-26 RX ORDER — CLOPIDOGREL BISULFATE 75 MG/1
TABLET ORAL
Status: DISCONTINUED
Start: 2021-10-26 | End: 2021-10-26 | Stop reason: WASHOUT

## 2021-10-26 RX ORDER — SODIUM CHLORIDE 9 MG/ML
INJECTION, SOLUTION INTRAVENOUS CONTINUOUS
Status: ACTIVE | OUTPATIENT
Start: 2021-10-26 | End: 2021-10-26

## 2021-10-26 RX ORDER — ASPIRIN 81 MG/1
81 TABLET ORAL DAILY
Status: DISCONTINUED | OUTPATIENT
Start: 2021-10-27 | End: 2021-10-26

## 2021-10-26 RX ORDER — HEPARIN SODIUM 5000 [USP'U]/ML
INJECTION, SOLUTION INTRAVENOUS; SUBCUTANEOUS
Status: COMPLETED
Start: 2021-10-26 | End: 2021-10-26

## 2021-10-26 RX ORDER — ACETAMINOPHEN 325 MG/1
650 TABLET ORAL EVERY 6 HOURS PRN
Status: DISCONTINUED | OUTPATIENT
Start: 2021-10-26 | End: 2021-10-28

## 2021-10-26 RX ORDER — HEPARIN SODIUM 5000 [USP'U]/ML
60 INJECTION INTRAVENOUS; SUBCUTANEOUS ONCE
Status: COMPLETED | OUTPATIENT
Start: 2021-10-26 | End: 2021-10-26

## 2021-10-26 RX ORDER — MORPHINE SULFATE 2 MG/ML
1 INJECTION, SOLUTION INTRAMUSCULAR; INTRAVENOUS EVERY 2 HOUR PRN
Status: DISCONTINUED | OUTPATIENT
Start: 2021-10-26 | End: 2021-10-28

## 2021-10-26 RX ORDER — NITROGLYCERIN 20 MG/100ML
INJECTION INTRAVENOUS CONTINUOUS
Status: DISCONTINUED | OUTPATIENT
Start: 2021-10-26 | End: 2021-10-28

## 2021-10-26 RX ORDER — METOCLOPRAMIDE HYDROCHLORIDE 5 MG/ML
10 INJECTION INTRAMUSCULAR; INTRAVENOUS EVERY 8 HOURS PRN
Status: DISCONTINUED | OUTPATIENT
Start: 2021-10-26 | End: 2021-10-28

## 2021-10-26 RX ORDER — ALBUTEROL SULFATE 90 UG/1
2 AEROSOL, METERED RESPIRATORY (INHALATION) EVERY 6 HOURS PRN
Status: DISCONTINUED | OUTPATIENT
Start: 2021-10-26 | End: 2021-10-28

## 2021-10-26 RX ORDER — NITROGLYCERIN 0.4 MG/1
0.4 TABLET SUBLINGUAL ONCE
Status: COMPLETED | OUTPATIENT
Start: 2021-10-26 | End: 2021-10-26

## 2021-10-26 RX ORDER — ROSUVASTATIN CALCIUM 20 MG/1
20 TABLET, COATED ORAL NIGHTLY
Status: DISCONTINUED | OUTPATIENT
Start: 2021-10-26 | End: 2021-10-28

## 2021-10-26 RX ORDER — TEMAZEPAM 15 MG/1
15 CAPSULE ORAL NIGHTLY
Status: DISCONTINUED | OUTPATIENT
Start: 2021-10-26 | End: 2021-10-28

## 2021-10-26 RX ORDER — ASPIRIN 81 MG/1
81 TABLET ORAL DAILY
Status: DISCONTINUED | OUTPATIENT
Start: 2021-10-27 | End: 2021-10-28

## 2021-10-26 RX ORDER — AZELASTINE HCL 205.5 UG/1
1 SPRAY NASAL
COMMUNITY
Start: 2021-10-19

## 2021-10-26 RX ORDER — ENTACAPONE 200 MG/1
200 TABLET ORAL DAILY
COMMUNITY
Start: 2021-10-11

## 2021-10-26 RX ORDER — HEPARIN SODIUM AND DEXTROSE 10000; 5 [USP'U]/100ML; G/100ML
INJECTION INTRAVENOUS CONTINUOUS
Status: DISCONTINUED | OUTPATIENT
Start: 2021-10-26 | End: 2021-10-26

## 2021-10-26 RX ORDER — MIDAZOLAM HYDROCHLORIDE 1 MG/ML
INJECTION INTRAMUSCULAR; INTRAVENOUS
Status: COMPLETED
Start: 2021-10-26 | End: 2021-10-26

## 2021-10-26 RX ORDER — MORPHINE SULFATE 2 MG/ML
2 INJECTION, SOLUTION INTRAMUSCULAR; INTRAVENOUS EVERY 2 HOUR PRN
Status: DISCONTINUED | OUTPATIENT
Start: 2021-10-26 | End: 2021-10-28

## 2021-10-26 RX ORDER — FINASTERIDE 5 MG/1
5 TABLET, FILM COATED ORAL NIGHTLY
Status: DISCONTINUED | OUTPATIENT
Start: 2021-10-26 | End: 2021-10-28

## 2021-10-26 RX ORDER — LISINOPRIL 5 MG/1
5 TABLET ORAL 2 TIMES DAILY
Status: DISCONTINUED | OUTPATIENT
Start: 2021-10-26 | End: 2021-10-28

## 2021-10-26 RX ORDER — ALPRAZOLAM 0.25 MG/1
0.25 TABLET ORAL NIGHTLY
Status: DISCONTINUED | OUTPATIENT
Start: 2021-10-26 | End: 2021-10-28

## 2021-10-26 RX ORDER — LIDOCAINE HYDROCHLORIDE 10 MG/ML
INJECTION, SOLUTION EPIDURAL; INFILTRATION; INTRACAUDAL; PERINEURAL
Status: COMPLETED
Start: 2021-10-26 | End: 2021-10-26

## 2021-10-26 RX ORDER — CLOPIDOGREL BISULFATE 75 MG/1
75 TABLET ORAL DAILY
Status: DISCONTINUED | OUTPATIENT
Start: 2021-10-27 | End: 2021-10-28

## 2021-10-26 RX ORDER — CLOPIDOGREL BISULFATE 75 MG/1
75 TABLET ORAL ONCE
Status: COMPLETED | OUTPATIENT
Start: 2021-10-26 | End: 2021-10-26

## 2021-10-26 RX ORDER — NITROGLYCERIN 0.4 MG/1
TABLET SUBLINGUAL
Status: COMPLETED
Start: 2021-10-26 | End: 2021-10-26

## 2021-10-26 RX ORDER — RASAGILINE 0.5 MG/1
1 TABLET ORAL DAILY
Status: DISCONTINUED | OUTPATIENT
Start: 2021-10-26 | End: 2021-10-28

## 2021-10-26 RX ORDER — MORPHINE SULFATE 4 MG/ML
4 INJECTION, SOLUTION INTRAMUSCULAR; INTRAVENOUS EVERY 2 HOUR PRN
Status: DISCONTINUED | OUTPATIENT
Start: 2021-10-26 | End: 2021-10-28

## 2021-10-26 NOTE — DIETARY NOTE
Clinical Nutrition     Dietitian consult received per cardiac rehab standing order. Pt to be educated by cardiac rehab staff and encouraged to attend outpatient classes taught by AUSTIN. AUSTIN available PRN.     [facility] Degree RD, LDN  Clinical Dietitian  Phone [phone]

## 2021-10-26 NOTE — PROCEDURES
[address] [facility] Location: Cath Lab    CSN [id number] MRN [id number]   Admission Date 10/26/2021 Procedure Date 10/26/2021   Attending Physician No att. providers found Procedure Physician Jared Fragoso MD         CARDIAC CATHETERIZATION ventricle: 110/15 mmHg  Aorta: 110/53/78 mmHg  Left ventriculogram demonstrated a LV ejection fraction of 50-55% without significant mitral regurgitation; mild mid-distal inferior hypokinesis is present. Small apical aneurysm is present.   There was no aort angiography demonstrated a good result with 0% residual stenosis, TIMI3 distal flow without dissection/perforation. MEDICATIONS:  See nursing record.      COMPLICATIONS:  No major complications were observed during this visit to the catheterization la

## 2021-10-26 NOTE — ED INITIAL ASSESSMENT (HPI)
Pt presents to ed via ems c/o chest pain at a 9/10 that started approx 1 hour ago, pt given nitroenroute and took daily asa prior to arrival, states pain is now improved to a 3/10

## 2021-10-26 NOTE — CONSULTS
[facility]  Consultation Note      Edythe Redo Patient Status:  Emergency    1940 MRN [id number]   Location [address] [facility] Attending Aliya Wynne MD   Hosp Day # 0 PCP Elvira Marte MD     [facility] DOSE, 12 Units/kg/hr, Intravenous, Once  nitroGLYCERIN infusion 50mg in D5W 250ml, 5-400 mcg/min, Intravenous, Continuous        Past Medical History:   Diagnosis Date   • Abdominal hernia    • Atherosclerosis of coronary artery    • BPH (benign prostatic negative for dyspnea on exertion  Cardiovascular: negative for chest pain  Gastrointestinal: negative for melena  Genitourinary:negative for hematuria  Hematologic/lymphatic: negative for bleeding  Musculoskeletal:negative for myalgias  Neurological: negat 10/26/2021    AST 23 10/26/2021    ALT 10 10/26/2021    PTT 34.1 10/26/2021    INR 1.04 10/26/2021    PTP 13.8 10/26/2021    DDIMER 0.29 10/26/2021    TROP <0.045 10/26/2021         Thank you for allowing our practice to participate in the care of your pat

## 2021-10-26 NOTE — PROGRESS NOTES
Pt supine, bleeding noted to right groin, manual pressure held. quickclot applied. New drainage noted, no hematoma, femstop applied. Dr Cosme Pate made aware, no new orders. POC updated to pt/family. Plavix not given in lab per ccl staff.  Pt still drowsy, will l

## 2021-10-26 NOTE — ED PROVIDER NOTES
Patient Seen in: [facility] Emergency Department      History   Patient presents with:  Chest Pain Angina    Stated Complaint: cp    Subjective:   HPI    Patient is an 80-year-old male with history of multiple comorbidities including coronary artery reading glasses   • Wears glasses     reading glasses               Past Surgical History:   Procedure Laterality Date   • ANGIOGRAM     • ANGIOPLASTY (CORONARY)     • INGUINAL HERNIA REPAIR Left 10/24/2016    Procedure:  HERNIA INGUINAL REPAIR ADULT;  Surg PANEL (14) - Abnormal; Notable for the following components:       Result Value    Glucose 105 (*)     Sodium 131 (*)     Calculated Osmolality 273 (*)     ALT 10 (*)     Albumin 3.0 (*)     A/G Ratio 0.9 (*)     All other components within normal limits anterior ischemia. Axis/intervals are noted. This is an acute change from previous EKGs. Discussed with further with patient. Still having pain. Will start IV heparin and IV nitroglycerin. Confirm patient is no longer taking Eliquis.   No history of

## 2021-10-26 NOTE — H&P
[facility] HOSPITALIST  History and Physical     Jim Posey Patient Status:  Emergency    1940 MRN [id number]   Location [address] [facility] Attending Andres Clay MD   Hosp Day # 0 PCP Efraín Cloud MD     Chief Complaint: JANELL   • STENT, COATED/COV W/DEL SYS Left 03/09/2021   • STENT, COATED/COV W/DEL SYS Right        Social History:  reports that he quit smoking about 10 years ago. He has a 3.00 pack-year smoking history.  He has never used smokeless tobacco. He reports pre daily., Disp: , Rfl:   ALPRAZolam 0.25 MG Oral Tab, Take 0.25 mg by mouth 3 (three) times daily as needed. , Disp: , Rfl: 1  Rasagiline Mesylate 1 MG Oral Tab, Take 1 mg by mouth daily. , Disp: , Rfl:   Amantadine HCl 100 MG Oral Cap, Take 100 mg by mouth 2 Results    COVID-19  Lab Results   Component Value Date    COVID19 Not Detected 10/26/2021    COVID19 Not Detected 04/10/2021    COVID19 Not Detected 03/06/2021       Pro-Calcitonin  No results for input(s): PCT in the last 168 hours.     Cardiac  Recent La

## 2021-10-26 NOTE — PROGRESS NOTES
Pt arrived from cath lab supine via bed. Pt drowsy, arousable. Wife at bedside. Right groin soft c/d/i, denies pain 0/10 on pain scale. 12 lead ekg completed. Plan to transfer to ccu when bed ready.

## 2021-10-27 ENCOUNTER — APPOINTMENT (OUTPATIENT)
Dept: CV DIAGNOSTICS | Facility: HOSPITAL | Age: 81
DRG: 247 | End: 2021-10-27
Attending: INTERNAL MEDICINE
Payer: MEDICARE

## 2021-10-27 PROCEDURE — 99232 SBSQ HOSP IP/OBS MODERATE 35: CPT | Performed by: STUDENT IN AN ORGANIZED HEALTH CARE EDUCATION/TRAINING PROGRAM

## 2021-10-27 PROCEDURE — 93306 TTE W/DOPPLER COMPLETE: CPT | Performed by: INTERNAL MEDICINE

## 2021-10-27 RX ORDER — HEPARIN SODIUM 5000 [USP'U]/ML
5000 INJECTION, SOLUTION INTRAVENOUS; SUBCUTANEOUS EVERY 8 HOURS SCHEDULED
Status: DISCONTINUED | OUTPATIENT
Start: 2021-10-27 | End: 2021-10-28

## 2021-10-27 RX ORDER — SENNOSIDES 8.6 MG
8.6 TABLET ORAL 2 TIMES DAILY
Status: DISCONTINUED | OUTPATIENT
Start: 2021-10-27 | End: 2021-10-28

## 2021-10-27 RX ORDER — POLYETHYLENE GLYCOL 3350 17 G/17G
17 POWDER, FOR SOLUTION ORAL DAILY PRN
Status: DISCONTINUED | OUTPATIENT
Start: 2021-10-27 | End: 2021-10-28

## 2021-10-27 NOTE — PROGRESS NOTES
[facility]     Hospitalist Progress Note     Andrés Foot Patient Status:  Inpatient    1940 MRN [id number]   [facility] 6NE-A Attending Paloma Kinsey MD   Hosp Day # 1 PCP Lia Solano MD     Chief Complaint: chest pain hours.    Inflammatory Markers  Recent Labs   Lab 10/26/21  0647   DDIMER 0.29       Imaging: Imaging data reviewed in Epic.     Medications:   • Senna  8.6 mg Oral BID   • ALPRAZolam  0.25 mg Oral Nightly   • Amantadine HCl  100 mg Oral BID   • carbidopa-l

## 2021-10-27 NOTE — CARDIAC REHAB
CAD teaching complete Pt just finished CR phase 2 and wants to return.  Did not want to set appointment will call us, has travel plans to [facility]. Stent card given  Denies questions

## 2021-10-27 NOTE — PROGRESS NOTES
[facility] Cardiology  Progress Note    Dread Alford Patient Status:  Inpatient    1940 MRN [id number]   [facility] 6NE-A Attending Elvis Jarrell MD   Hosp Day # 1 PCP Fuentes Hernandez MD     Outpatient cardiologist:  Huma Heaton (90 Base) MCG/ACT inhaler 2 puff, 2 puff, Inhalation, Q6H PRN  ALPRAZolam (XANAX) tab 0.25 mg, 0.25 mg, Oral, Nightly  Amantadine HCl (SYMMETREL) tab 100 mg, 100 mg, Oral, BID  carbidopa-levodopa (SINEMET)  MG per tab 1 tablet, 1 tablet, Oral, 3 time bradyarrhythmias      Thank you for allowing our practice to participate in the care of your patient. Please do not hesitate to contact me if you have any questions.     Kashif Bliss MD  10/27/2021  9:28 AM

## 2021-10-27 NOTE — PHYSICAL THERAPY NOTE
PHYSICAL THERAPY EVALUATION - INPATIENT     Room Number: 6851/6357-M  Evaluation Date: 10/27/2021  Type of Evaluation: Initial  Physician Order: PT Eval and Treat    Presenting Problem: unstable angina  Reason for Therapy: Mobility Dysfunction and [facility]    PLAN  Patient currently does not meet criteria for skilled inpatient physical therapy services, however patient will continue to benefit from QID ambulation with nursing staff. Pt is hereby discharged from [address]   RN aware to re-order if bed to a chair (including a wheelchair)?: None   -   Need to walk in hospital room?: A Little   -   Climbing 3-5 steps with a railing?: A Little       AM-PAC Score:  Raw Score: 22   Approx Degree of Impairment: 20.91%   Standardized Score (AM-PAC Scale): 5

## 2021-10-27 NOTE — PLAN OF CARE
Assumed patient care at 1400 from PACU. Patient lying flat, alert/oriented. Fem stop in place d/t right groin oozing, removed at 1500, site soft/no hematoma. Monitor shows NSR/SB. Pulses +2. Tolerating diet.  Voiding in urinal. At 1800 right groin oozing ag

## 2021-10-28 VITALS
SYSTOLIC BLOOD PRESSURE: 140 MMHG | RESPIRATION RATE: 18 BRPM | TEMPERATURE: 98 F | HEART RATE: 75 BPM | OXYGEN SATURATION: 95 % | WEIGHT: 128.06 LBS | DIASTOLIC BLOOD PRESSURE: 66 MMHG | BODY MASS INDEX: 19 KG/M2

## 2021-10-28 PROBLEM — I10 HYPERTENSION: Status: ACTIVE | Noted: 2020-06-25

## 2021-10-28 PROCEDURE — 99239 HOSP IP/OBS DSCHRG MGMT >30: CPT | Performed by: STUDENT IN AN ORGANIZED HEALTH CARE EDUCATION/TRAINING PROGRAM

## 2021-10-28 RX ORDER — LISINOPRIL 5 MG/1
5 TABLET ORAL 2 TIMES DAILY
Qty: 180 TABLET | Refills: 0 | Status: SHIPPED | OUTPATIENT
Start: 2021-10-28 | End: 2021-11-30

## 2021-10-28 NOTE — PROGRESS NOTES
[facility]  Progress Note    [place] Patient Status:  Inpatient    1940 MRN [id number]   [facility] 6NE-A Attending Ulysses Dalton MD   Hosp Day # 2 PCP Juan Garcia MD     Outpatient cardiologist:  Dianna Meier 1 suppository, Rectal, Daily PRN  polyethylene glycol (PEG 3350) (MIRALAX) powder packet 17 g, 17 g, Oral, Daily PRN  Heparin Sodium (Porcine) 5000 UNIT/ML injection 5,000 Units, 5,000 Units, Subcutaneous, Q8H NIKHIL  nitroGLYCERIN infusion 50mg in D5W 250ml, practice to participate in the care of your patient. Please do not hesitate to contact me if you have any questions.     Hoda Stevenson MD

## 2021-10-28 NOTE — PLAN OF CARE
Received pt at 0730. A&o x4. NSR on tele. VSS. Denies chest pain and SOB. WNL on room air. Up x2 walker. Continent of B/B. R groin CDI, ecchymosis, soft, no hematoma.     Problem: Patient/Family Goals  Goal: Patient/Family Long Term Goal  Description: Anel

## 2021-10-28 NOTE — PLAN OF CARE
Assumed care of patient at [address]. Patient up in chair. A/Ox4 VSS assessed groin site with day nurse. Site is clean, dry, and intact, soft. Wife at bedside. Discussed plan of care with patient and family. All questions answered.

## 2021-10-28 NOTE — PLAN OF CARE
Assumed care of the pt at 0730, assisted pt to chair, R groin c/d/I, walked in halls multiple times, no c/o any pain. PT/ OT to bedside, recommending home. Wife to bedside, updated with plan of care. Remains on room air and saline locked.     Problem: Anel

## 2021-10-28 NOTE — OCCUPATIONAL THERAPY NOTE
OT order received and pt chart reviewed.  Met with pt this AM. Per chart review, pt was independent to supervision with mobility and transfers yesterday with PT caroline. Pt reports today no concerns about bathing, dressing, toileting, nor other ADLs, IADLs and

## 2021-10-28 NOTE — PROGRESS NOTES
NURSING DISCHARGE NOTE    Discharged Home via Wheelchair  Accompanied by Spouse  Belongings Taken by patient/family. Tele dc'd. IV removed. Catheter intact. Discharge instructions completed. Pt verbalized understanding.

## 2021-10-28 NOTE — DISCHARGE SUMMARY
[facility]PITAL[facility]  DISCHARGE SUMMARY     Cally Mcdaniel Patient Status:  Inpatient    1940 MRN [id number]   [facility] 8NE-A Attending Christos Khan MD   [facility] Day # 2 PCP Dung MD Romelia     Date of Admission: 10/26/2021  Date of D 180 tablet  Refills: 0        CONTINUE taking these medications      Instructions Prescription details   albuterol 108 (90 Base) MCG/ACT Aers      Inhale 2 puffs into the lungs every 6 (six) hours as needed for Wheezing.    Quantity: 1 each  Refills: 0 0     rosuvastatin 20 MG Tabs  Commonly known as: CRESTOR      Take 1 tablet (20 mg total) by mouth nightly. Quantity: 90 tablet  Refills: 3     temazepam 15 MG Caps  Commonly known as: RESTORIL      Take 15 mg by mouth nightly.    Refills: 0           Wh

## 2021-10-29 NOTE — PAYOR COMM NOTE
--------------  ADMISSION REVIEW     Payor: Francis Li  Subscriber #:  [id number]  Authorization Number: [id number]    Admit date: 10/26/21  Admit time:  2:43 PM       REVIEW DOCUMENTATION:     ED Provider Notes      ED Provider Notes signed by Lillie Boateng Fatigue    • Frequent urination    • Frequent use of laxatives    • GENITO-URINARY DISEASE    • Heart attack (HCC)    • High blood pressure    • High cholesterol    • Hx musculoskletl dis NEC    • Muscle weakness     from the Parkinsons   • Other and unspe Normal cephalic atraumatic. Nonicteric sclera. Moist mucous membranes. No meningismus. No adenopathy  Lungs: No tachypnea. No chucho wheezing. Mildly prolonged expiratory phase. Cardiac: No tachycardia. No murmurs. Regular rate and rhythm.   Abdomen reviewed      MDM      Atypical chest pain in a high risk patient. Associate with cough. Initially relieved with albuterol. Patient does have a history of PE as well as known coronary disease. Will check troponin, D-dimer.   Will check a 2-hour troponin 10/26/2021  4:45 PM     Author: Ashley Mcintosh MD Service: Yokasta Graft Type: Physician    Filed: 10/26/2021  4:45 PM Date of Service: 10/26/2021  9:05 AM Status: Signed    : Ashley Mcintosh MD (Physician)           [facility]  History and Physi Laterality Date   • ANGIOGRAM     • ANGIOPLASTY (CORONARY)     • INGUINAL HERNIA REPAIR Left 10/24/2016    Procedure:  HERNIA INGUINAL REPAIR ADULT;  Surgeon: Denette Castleman, MD;  Location: [facility] MAIN OR   • OTHER      TURP   • STENT, COATED/COV W/DEL SYS Left tongue every 5 (five) minutes as needed for Chest pain., Disp: 30 tablet, Rfl: 0  aspirin 81 MG Oral Tab EC, Take 1 tablet (81 mg total) by mouth daily. , Disp: , Rfl: 0  finasteride 5 MG Oral Tab, Take 5 mg by mouth daily. , Disp: , Rfl:   ALPRAZolam 0.25 M CO2 27.0   ALKPHO 56   AST 23   ALT 10*   BILT 0.5   TP 6.5       Estimated Creatinine Clearance: 62 mL/min (based on SCr of 0.82 mg/dL).     Recent Labs   Lab 10/26/21  0647   PTP 13.8   INR 1.04       COVID-19 Lab Results    COVID-19  Lab Results   Comp 10/28/21 0655 98 °F (36.7 °C) 75 18 140/66 95 % — None (Room air) — BHASKAR    10/28/21 0400 98.1 °F (36.7 °C) 88 14 119/57 94 % — None (Room air) — SE    10/28/21 0000 98.1 °F (36.7 °C) 78 16 139/67 92 % — None (Room air) — SE    10/27/21 2000 98 °F (36.7 ° regurgitation; mild mid-distal inferior hypokinesis is present. Small apical aneurysm is present.   There was no aortic stenosis upon pullback of the catheter.     2.  Selective coronary angiography:       Left main artery: The left main artery is a medium (10/26/21): LM-ok, LAD patent prox stent, LCx- ok, RCA: prox 70% s/p PCI, distal 90% ISR s/p IVUS/PCI  2. labile HTN in setting Parkinson's Disease  3. HL     Recommendations:  - mild RFA oozing s/p fem stop. soft this AM.  Hg stable.   - ambulate today  -    10/28 CARDIOLOGY NOTE     Impression:  1. chest pain, hx of CAD  - admitted 3/21 (unstable angina) s/p PCI to RCA, rota/PCI to LAD  - [facility] (10/26/21): LM-ok, LAD patent prox stent, LCx- ok, RCA: prox 70% s/p PCI, distal 90% ISR s/p IVUS/PCI  2. labile

## 2021-10-29 NOTE — PAYOR COMM NOTE
--------------  DISCHARGE REVIEW    Payor: Tabatha Cruzt  Subscriber #:  [id number]  Authorization Number: [id number]    Admit date: 10/26/21  Admit time:   2:43 PM  Discharge Date: 10/28/2021  1:12 PM     Admitting Physician: Hortencia De Santiago MD  Attendin Risk  0-28   Low Risk         TCM Follow-Up Recommendation:  LACE > 58:  High Risk of readmission after discharge from the hospital.    Procedures during hospitalization:   • PCI     Incidental or significant findings and recommendations (brief descriptions Tabs  Commonly known as: COMTAN      Take 200 mg by mouth daily. Refills: 0     finasteride 5 MG Tabs  Commonly known as: PROSCAR      Take 5 mg by mouth daily.    Refills: 0     Inbrija 42 MG Caps  Generic drug: Levodopa      Inhale 2 capsules into the l bilaterally. No wheezes. No rhonchi. Cardiovascular: S1, S2. Regular rate and rhythm. No murmurs, rubs or gallops. Abdomen: Soft, nontender, nondistended. Positive bowel sounds. No rebound or guarding. Neurologic: No focal neurological deficits.    Carlos Frye

## 2021-11-04 ENCOUNTER — APPOINTMENT (OUTPATIENT)
Dept: GENERAL RADIOLOGY | Age: 81
End: 2021-11-04
Attending: EMERGENCY MEDICINE
Payer: MEDICARE

## 2021-11-04 ENCOUNTER — HOSPITAL ENCOUNTER (EMERGENCY)
Age: 81
Discharge: HOME | End: 2021-11-04
Attending: EMERGENCY MEDICINE
Payer: MEDICARE

## 2021-11-04 VITALS
SYSTOLIC BLOOD PRESSURE: 132 MMHG | OXYGEN SATURATION: 98 % | DIASTOLIC BLOOD PRESSURE: 78 MMHG | RESPIRATION RATE: 20 BRPM | TEMPERATURE: 98 F | WEIGHT: 135 LBS | BODY MASS INDEX: 20 KG/M2 | HEART RATE: 74 BPM

## 2021-11-04 DIAGNOSIS — R05.9 COUGH: Primary | ICD-10-CM

## 2021-11-04 PROCEDURE — 71045 X-RAY EXAM CHEST 1 VIEW: CPT | Performed by: EMERGENCY MEDICINE

## 2021-11-04 PROCEDURE — 99283 EMERGENCY DEPT VISIT LOW MDM: CPT | Performed by: EMERGENCY MEDICINE

## 2021-11-04 NOTE — ED PROVIDER NOTES
Patient Seen in: [facility] Emergency Department In [place]      History   Patient presents with:  Cough/URI  Difficulty Breathing    Stated Complaint: cough onset 11/2 after hospitalized for cardiac sent, on abx since yesterday bu*    Subjective:   HPI noted  Back nontender without CVA tenderness  Extremity no clubbing, cyanosis or edema noted.   Full range of motion noted without tenderness  Neuro: No focal deficits noted    ED Course   Labs Reviewed - No data to display  Chest x-ray today was negative f

## 2021-11-20 ENCOUNTER — LAB ENCOUNTER (OUTPATIENT)
Dept: LAB | Age: 81
End: 2021-11-20
Attending: INTERNAL MEDICINE
Payer: MEDICARE

## 2021-11-20 DIAGNOSIS — I21.4 ACUTE MYOCARDIAL INFARCTION, SUBENDOCARDIAL INFARCTION, INITIAL EPISODE OF CARE (HCC): ICD-10-CM

## 2021-11-23 ENCOUNTER — HOSPITAL ENCOUNTER (OUTPATIENT)
Dept: CV DIAGNOSTICS | Age: 81
Discharge: HOME | End: 2021-11-23
Attending: INTERNAL MEDICINE
Payer: MEDICARE

## 2021-11-23 DIAGNOSIS — I21.4 NSTEMI (NON-ST ELEVATED MYOCARDIAL INFARCTION) (HCC): ICD-10-CM

## 2021-11-23 PROCEDURE — 93018 CV STRESS TEST I&R ONLY: CPT | Performed by: INTERNAL MEDICINE

## 2021-11-23 PROCEDURE — 93017 CV STRESS TEST TRACING ONLY: CPT | Performed by: INTERNAL MEDICINE

## 2021-11-23 NOTE — PROGRESS NOTES
Future Appointments  12/3/2021  10:00 AM    XR RM4 (UGI & SWALLOW * [facility]  12/14/2021 1:00 PM    [facility] CARDIAC REHAB ROOM 1    [address] [place] [address]  1/14/2022  10:00 AM   Michelle Kraus MD         WSTMT CARDIO        DM

## 2021-11-28 ENCOUNTER — APPOINTMENT (OUTPATIENT)
Dept: GENERAL RADIOLOGY | Facility: HOSPITAL | Age: 81
End: 2021-11-28
Attending: EMERGENCY MEDICINE
Payer: MEDICARE

## 2021-11-28 ENCOUNTER — HOSPITAL ENCOUNTER (OUTPATIENT)
Facility: HOSPITAL | Age: 81
Setting detail: OBSERVATION
Discharge: HOME | End: 2021-11-30
Attending: EMERGENCY MEDICINE | Admitting: STUDENT IN AN ORGANIZED HEALTH CARE EDUCATION/TRAINING PROGRAM
Payer: MEDICARE

## 2021-11-28 DIAGNOSIS — R07.9 CHEST PAIN, RULE OUT ACUTE MYOCARDIAL INFARCTION: ICD-10-CM

## 2021-11-28 DIAGNOSIS — D64.9 ANEMIA, UNSPECIFIED TYPE: ICD-10-CM

## 2021-11-28 DIAGNOSIS — R03.0 ELEVATED BLOOD PRESSURE READING: ICD-10-CM

## 2021-11-28 DIAGNOSIS — R53.1 WEAKNESS GENERALIZED: ICD-10-CM

## 2021-11-28 DIAGNOSIS — G20 PARKINSON'S DISEASE (HCC): Primary | ICD-10-CM

## 2021-11-28 PROCEDURE — 71045 X-RAY EXAM CHEST 1 VIEW: CPT | Performed by: EMERGENCY MEDICINE

## 2021-11-29 PROBLEM — R03.0 ELEVATED BLOOD PRESSURE READING: Status: ACTIVE | Noted: 2021-11-29

## 2021-11-29 PROBLEM — R03.0 ELEVATED BLOOD PRESSURE READING: Status: ACTIVE | Noted: 2021-01-01

## 2021-11-29 PROBLEM — R07.9 CHEST PAIN, RULE OUT ACUTE MYOCARDIAL INFARCTION: Status: ACTIVE | Noted: 2021-01-01

## 2021-11-29 PROBLEM — D64.9 ANEMIA, UNSPECIFIED TYPE: Status: ACTIVE | Noted: 2021-01-01

## 2021-11-29 PROBLEM — D64.9 ANEMIA, UNSPECIFIED TYPE: Status: ACTIVE | Noted: 2021-11-29

## 2021-11-29 PROBLEM — R07.9 CHEST PAIN, RULE OUT ACUTE MYOCARDIAL INFARCTION: Status: ACTIVE | Noted: 2021-11-29

## 2021-11-29 PROCEDURE — 99219 INITIAL OBSERVATION CARE,LEVL II: CPT | Performed by: STUDENT IN AN ORGANIZED HEALTH CARE EDUCATION/TRAINING PROGRAM

## 2021-11-29 RX ORDER — ALPRAZOLAM 0.25 MG/1
0.25 TABLET ORAL NIGHTLY PRN
Status: DISCONTINUED | OUTPATIENT
Start: 2021-11-29 | End: 2021-11-30

## 2021-11-29 RX ORDER — CLOPIDOGREL BISULFATE 75 MG/1
75 TABLET ORAL DAILY
Status: DISCONTINUED | OUTPATIENT
Start: 2021-11-29 | End: 2021-11-30

## 2021-11-29 RX ORDER — AZELASTINE 1 MG/ML
1-2 SPRAY, METERED NASAL 2 TIMES DAILY
Status: DISCONTINUED | OUTPATIENT
Start: 2021-11-29 | End: 2021-11-30

## 2021-11-29 RX ORDER — HYDRALAZINE HYDROCHLORIDE 20 MG/ML
5 INJECTION INTRAMUSCULAR; INTRAVENOUS EVERY 6 HOURS PRN
Status: DISCONTINUED | OUTPATIENT
Start: 2021-11-29 | End: 2021-11-29

## 2021-11-29 RX ORDER — RASAGILINE 0.5 MG/1
1 TABLET ORAL DAILY
Status: DISCONTINUED | OUTPATIENT
Start: 2021-11-29 | End: 2021-11-30

## 2021-11-29 RX ORDER — ALBUTEROL SULFATE 90 UG/1
2 AEROSOL, METERED RESPIRATORY (INHALATION) EVERY 6 HOURS PRN
Status: DISCONTINUED | OUTPATIENT
Start: 2021-11-29 | End: 2021-11-30

## 2021-11-29 RX ORDER — ROSUVASTATIN CALCIUM 10 MG/1
20 TABLET, COATED ORAL NIGHTLY
Status: DISCONTINUED | OUTPATIENT
Start: 2021-11-29 | End: 2021-11-30

## 2021-11-29 RX ORDER — ASPIRIN 81 MG/1
81 TABLET ORAL DAILY
Status: DISCONTINUED | OUTPATIENT
Start: 2021-11-29 | End: 2021-11-30

## 2021-11-29 RX ORDER — ENOXAPARIN SODIUM 100 MG/ML
40 INJECTION SUBCUTANEOUS DAILY
Status: DISCONTINUED | OUTPATIENT
Start: 2021-11-29 | End: 2021-11-30

## 2021-11-29 RX ORDER — HYDRALAZINE HYDROCHLORIDE 20 MG/ML
5 INJECTION INTRAMUSCULAR; INTRAVENOUS EVERY 6 HOURS PRN
Status: DISCONTINUED | OUTPATIENT
Start: 2021-11-29 | End: 2021-11-30

## 2021-11-29 RX ORDER — LISINOPRIL 5 MG/1
5 TABLET ORAL 2 TIMES DAILY
Status: DISCONTINUED | OUTPATIENT
Start: 2021-11-29 | End: 2021-11-30

## 2021-11-29 RX ORDER — FINASTERIDE 5 MG/1
5 TABLET, FILM COATED ORAL DAILY
Status: DISCONTINUED | OUTPATIENT
Start: 2021-11-29 | End: 2021-11-30

## 2021-11-29 RX ORDER — TEMAZEPAM 15 MG/1
15 CAPSULE ORAL NIGHTLY
Status: DISCONTINUED | OUTPATIENT
Start: 2021-11-29 | End: 2021-11-30

## 2021-11-29 RX ORDER — AMANTADINE HYDROCHLORIDE 100 MG/1
100 TABLET ORAL 2 TIMES DAILY
Status: DISCONTINUED | OUTPATIENT
Start: 2021-11-29 | End: 2021-11-30

## 2021-11-29 NOTE — CONSULTS
[facility]  Report of Cardiology Consultation    Grace Earl Patient Status:  Observation    1940 MRN [id number]   [facility] 7NE-A Attending Rudy Mclain MD   Hosp Day # 0 PCP Teresita Brown MD     Reason for Consultation: INGUINAL REPAIR ADULT;  Surgeon: Tari Fraser MD;  Location: [address] [place] [address] OR   • OTHER      TURP   • STENT, COATED/COV W/DEL SYS Left 03/09/2021   • STENT, COATED/COV W/DEL SYS Right      Family History   Problem Relation Age of Onset   • Heart Attack Father systems were reviewed and are negative except as described above in HPI. Physical Exam:  Blood pressure 115/57, pulse 66, temperature 98.4 °F (36.9 °C), temperature source Oral, resp. rate 14, SpO2 96 %.   Temp (24hrs), Av.3 °F (36.8 °C), Min:98.2 °F metoprolol and lisinopril. CAD  - s/p NSTEMI and PCI to RCA, rota/PCI to LAD 3/2021, and PCI to RCA x2 10/26/2021  - continue asa/plavix/statin therapy. - wife/patient concerned that HTN is 2/2 issues with stents.  Assured that troponin is normal, EKG

## 2021-11-29 NOTE — PHYSICAL THERAPY NOTE
PHYSICAL THERAPY EVALUATION - INPATIENT     Room Number: 2024/5116-F  Evaluation Date: 11/29/2021  Type of Evaluation: Initial  Physician Order: PT Eval and Treat    Presenting Problem: HTN  Co-Morbidities : PD  Reason for Therapy: Mobility Dysfuncti RECOMMENDATIONS  PT Discharge Recommendations: Home; Intermittent Supervision; Outpatient PT    PLAN       Patient currently does not meet criteria for skilled inpatient physical therapy services, however patient will continue to benefit from QID ambulation Sitting down on and standing up from a chair with arms (e.g., wheelchair, bedside commode, etc.): None   Patient Difficulty: Moving from lying on back to sitting on the side of the bed?: None   How much help from another person does the patient currently n

## 2021-11-29 NOTE — PLAN OF CARE
NURSING ADMISSION NOTE      Patient admitted via Cart  Oriented to room. Safety precautions initiated. Bed in low position. Call light in reach.        Problem: CARDIOVASCULAR - ADULT  Goal: Maintains optimal cardiac output and hemodynamic stability INTERVENTIONS:  - Assess patient stability and activity tolerance for standing, transferring and ambulating w/ or w/o assistive devices  - Assist with transfers and ambulation using safe patient handling equipment as needed  - Ensure adequate protection fo encourage patient/family in tolerated functional activity level and precautions during self-care    Outcome: Progressing

## 2021-11-29 NOTE — H&P
[facility] HOSPITALIST  History and Physical     [facility] Patient Status:  Observation    1940 MRN [id number]   [facility] 7NE-A Attending Kristal Gunter MD   Hosp Day # 0 PCP Deonte Victor MD     Chief Complaint: Hypertension W/DEL SYS Left 03/09/2021   • STENT, COATED/COV W/DEL SYS Right        Social History:  reports that he quit smoking about 10 years ago. He has a 3.00 pack-year smoking history.  He has never used smokeless tobacco. He reports previous alcohol use of about Tab, Place 1 tablet (0.4 mg total) under the tongue every 5 (five) minutes as needed for Chest pain., Disp: 30 tablet, Rfl: 0  aspirin 81 MG Oral Tab EC, Take 1 tablet (81 mg total) by mouth daily. , Disp: , Rfl: 0  finasteride 5 MG Oral Tab, Take 5 mg by m AST 14*   ALT 7*   BILT 0.4   TP 7.4       CrCl cannot be calculated (Unknown ideal weight.). No results for input(s): PTP, INR in the last 168 hours.     COVID-19 Lab Results    COVID-19  Lab Results   Component Value Date    COVID19 Not Detected 11/2

## 2021-11-29 NOTE — PLAN OF CARE
Assumed care @0730  VSS,   A&Ox4, delayed responses  RA    NSR, blood pressure has been within parameters. Orthostatics complete. Denies Pain,   Up with 1 assist and walker as tolerated. Tolerating cardiac diet.   Intake and outputs w/d/l.    PT/OT rec tooth brush  - Limit straining and forceful nose blowing  Outcome: Progressing     Problem: MUSCULOSKELETAL - ADULT  Goal: Return mobility to safest level of function  Description: INTERVENTIONS:  - Assess patient stability and activity tolerance for stand ability and encourage patient to participate in ADLs to maximize function  - Promote sitting position while performing ADLs such as feeding, grooming, and bathing  - Educate and encourage patient/family in tolerated functional activity level and precaution

## 2021-11-29 NOTE — ED QUICK NOTES
Orders for admission, patient is aware of plan and ready to go upstairs. Any questions, please call ED ABHI Valentino Sa  at extension [phone]. Vaccinated?  yes  Type of COVID test sent:rapid  COVID Suspicion level: Low/High low      Titratable drug(s) infusing:

## 2021-11-29 NOTE — PROGRESS NOTES
This note also relates to the following rows which could not be included:  Pulse - Cannot attach notes to unvalidated device data       11/29/21 1016 11/29/21 1019 11/29/21 1022   Vital Signs   Pulse 83 88 94   /51 124/63 114/56   MAP (mmHg) 69 77 68

## 2021-11-29 NOTE — ED INITIAL ASSESSMENT (HPI)
Pt to ed for c/o chest heaviness and hypertension which began today. Pt states he took his bp at home and it was 185/105. States heaviness started at 2130. Denies SOB. Pt states he had cardiac stents placed 5 weeks ago.  Per family these are the same sympto

## 2021-11-29 NOTE — ED PROVIDER NOTES
Patient Seen in: [facility] Emergency Department      History   Patient presents with:  Hypertension    Stated Complaint: HTN- recent cardiac stents    Subjective:   HPI    Is a pleasant 44-year-old male presenting with elevated blood pressure.   Liudmila Aparicio Normal   TROPONIN I - Normal   RAPID SARS-COV-2 BY PCR - Normal   CBC WITH DIFFERENTIAL WITH PLATELET    Narrative: The following orders were created for panel order CBC With Differential With Platelet.   Procedure                               Abnormal

## 2021-11-30 VITALS
OXYGEN SATURATION: 100 % | DIASTOLIC BLOOD PRESSURE: 83 MMHG | SYSTOLIC BLOOD PRESSURE: 151 MMHG | RESPIRATION RATE: 16 BRPM | HEART RATE: 67 BPM | TEMPERATURE: 97 F

## 2021-11-30 PROCEDURE — 99217 OBSERVATION CARE DISCHARGE: CPT | Performed by: HOSPITALIST

## 2021-11-30 RX ORDER — LISINOPRIL 5 MG/1
5 TABLET ORAL 2 TIMES DAILY
Qty: 60 TABLET | Refills: 1 | Status: SHIPPED | OUTPATIENT
Start: 2021-11-30

## 2021-11-30 NOTE — PROGRESS NOTES
[facility] [address] [place] [address] Cardiology  Progress Note    Royal Lee Patient Status:  Observation    1940 MRN [id number]   [facility] 7NE-A Attending Lola Panda MD   Hosp Day # 0 PCP Nanci Morris MD     Subjective:   No chest pain. examination demonstrates a regular rate and rhythm. There is normal S1, S2. There is no S3 or S4. There are no murmurs, rubs, or gallops. No click is appreciated. PMI is nondisplaced with a normal apical impulse.     Pulmonary:  Lungs are clear to ausc dosing Rx at home - varying doses on varying days. Recommend consistent schedule of antihypertensives in this setting. 3.  Optimize hydration  4.   F/IU APN in 1-2 weeks        Ric Blair MD  11/30/2021  3:25 PM

## 2021-11-30 NOTE — PROGRESS NOTES
11/30/21 0641 11/30/21 0643 11/30/21 0645   Vital Signs   Pulse 70 71 77   /72 133/67 117/66   MAP (mmHg) 86 83 78   BP Location Right arm Right arm Right arm   BP Method Automatic Automatic Automatic   Patient Position Lying Sitting Standing

## 2021-11-30 NOTE — PROGRESS NOTES
[facility]     Hospitalist Progress Note     Trina Pierson Patient Status:  Observation    1940 MRN [id number]   [facility] 7NE-A Attending Thee Cavazos MD   Hosp Day # 0 PCP Maureen Barros MD     Chief Complaint: HTN   Jose Loconel Azelastine HCl  1-2 spray Nasal BID   • carbidopa-levodopa  1 tablet Oral TID   • carbidopa-levodopa  1.5 tablet Oral Daily   • clopidogrel  75 mg Oral Daily   • fluticasone furoate  1 puff Inhalation Daily   • finasteride  5 mg Oral Daily   • metoprolol t

## 2021-11-30 NOTE — PLAN OF CARE
NURSING DISCHARGE NOTE    Discharged Home via Wheelchair. Accompanied by Family member and Support staff  Belongings Taken by patient/family. Assumed care @0730  VSS,   A&Ox4, delayed responses  RA    NSR, blood pressure has been within parameters. symptoms of internal bleeding  - Monitor lab trends  - Patient is to report abnormal signs of bleeding to staff  - Avoid use of toothpicks and dental floss  - Use electric shaver for shaving  - Use soft bristle tooth brush  - Limit straining and forceful n Educate and engage patient/family in tolerated activity level and precautions  Outcome: Adequate for Discharge     Problem: Impaired Activities of Daily Living  Goal: Achieve highest/safest level of independence in self care  Description: Interventions:  - Annia Washburn), Orthopaedic Surgery  [address] [place] [address]  Phone: [phone]  Fax: [phone]

## 2021-11-30 NOTE — PLAN OF CARE
Received patient at 1. Patient A/O x 4. NSR/SB on tele. O2 saturation 98% on RA. No C/O chest pain or SOB. Patient voiding with no issue. x1 with walker. Continent of bowel and bladder. Bed is locked and in low position.  Call light and personal item

## 2021-12-01 NOTE — DISCHARGE SUMMARY
IVA HOSPITALIST  DISCHARGE SUMMARY     [facility] Patient Status:  Observation    1940 MRN [id number]   [facility] 7NE-A Attending No att. providers found   Hosp Day # 0 PCP Manuel Matos MD     Date of Admission: 2021  D hospitalization:   • None    Incidental or significant findings and recommendations (brief descriptions):  • Per Brief Synopsis of Hospital Course    Lab/Test results pending at Discharge:   · None    Consultants:  • Cardiology    Discharge Medication List TABLET(75 MG) BY MOUTH DAILY   Quantity: 90 tablet  Refills: 3     entacapone 200 MG Tabs  Commonly known as: COMTAN      Take 200 mg by mouth daily. Refills: 0     finasteride 5 MG Tabs  Commonly known as: PROSCAR      Take 5 mg by mouth daily.    Refill

## 2021-12-08 ENCOUNTER — ORDER TRANSCRIPTION (OUTPATIENT)
Dept: CARDIAC REHAB | Facility: HOSPITAL | Age: 81
End: 2021-12-08

## 2021-12-08 DIAGNOSIS — Z95.5 STENTED CORONARY ARTERY: Primary | ICD-10-CM

## 2021-12-14 ENCOUNTER — APPOINTMENT (OUTPATIENT)
Dept: CARDIAC REHAB | Facility: HOSPITAL | Age: 81
End: 2021-12-14
Attending: INTERNAL MEDICINE

## 2022-01-01 ENCOUNTER — APPOINTMENT (OUTPATIENT)
Dept: GENERAL RADIOLOGY | Facility: HOSPITAL | Age: 82
End: 2022-01-01
Attending: EMERGENCY MEDICINE
Payer: MEDICARE

## 2022-01-01 ENCOUNTER — APPOINTMENT (OUTPATIENT)
Dept: PHYSICAL THERAPY | Facility: HOSPITAL | Age: 82
End: 2022-01-01
Attending: HOSPITALIST
Payer: MEDICARE

## 2022-01-01 ENCOUNTER — APPOINTMENT (OUTPATIENT)
Dept: GENERAL RADIOLOGY | Facility: HOSPITAL | Age: 82
End: 2022-01-01
Attending: NURSE PRACTITIONER
Payer: MEDICARE

## 2022-01-01 ENCOUNTER — APPOINTMENT (OUTPATIENT)
Dept: CV DIAGNOSTICS | Facility: HOSPITAL | Age: 82
End: 2022-01-01
Attending: HOSPITALIST
Payer: MEDICARE

## 2022-01-01 ENCOUNTER — APPOINTMENT (OUTPATIENT)
Dept: GENERAL RADIOLOGY | Facility: HOSPITAL | Age: 82
End: 2022-01-01
Payer: MEDICARE

## 2022-01-01 ENCOUNTER — HOSPITAL ENCOUNTER (INPATIENT)
Facility: HOSPITAL | Age: 82
LOS: 4 days | End: 2022-01-01
Attending: EMERGENCY MEDICINE | Admitting: HOSPITALIST
Payer: MEDICARE

## 2022-01-01 ENCOUNTER — APPOINTMENT (OUTPATIENT)
Dept: CT IMAGING | Facility: HOSPITAL | Age: 82
End: 2022-01-01
Payer: MEDICARE

## 2022-01-01 ENCOUNTER — APPOINTMENT (OUTPATIENT)
Dept: CT IMAGING | Facility: HOSPITAL | Age: 82
End: 2022-01-01
Attending: NURSE PRACTITIONER
Payer: MEDICARE

## 2022-01-01 VITALS
OXYGEN SATURATION: 87 % | DIASTOLIC BLOOD PRESSURE: 27 MMHG | WEIGHT: 128.06 LBS | HEART RATE: 73 BPM | SYSTOLIC BLOOD PRESSURE: 43 MMHG | TEMPERATURE: 98 F | RESPIRATION RATE: 16 BRPM

## 2022-01-01 DIAGNOSIS — I62.9 INTRACRANIAL HEMORRHAGE (HCC): Primary | ICD-10-CM

## 2022-01-01 LAB
ADENOVIRUS PCR:: NOT DETECTED
ALBUMIN SERPL-MCNC: 2.6 G/DL (ref 3.4–5)
ALBUMIN SERPL-MCNC: 3.4 G/DL (ref 3.4–5)
ALBUMIN/GLOB SERPL: 0.8 {RATIO} (ref 1–2)
ALBUMIN/GLOB SERPL: 1.2 {RATIO} (ref 1–2)
ALP LIVER SERPL-CCNC: 46 U/L
ALP LIVER SERPL-CCNC: 61 U/L
ALT SERPL-CCNC: 25 U/L
ALT SERPL-CCNC: 9 U/L
ANION GAP SERPL CALC-SCNC: 7 MMOL/L (ref 0–18)
ANION GAP SERPL CALC-SCNC: 7 MMOL/L (ref 0–18)
ANION GAP SERPL CALC-SCNC: 9 MMOL/L (ref 0–18)
ANTIBODY SCREEN: NEGATIVE
APTT PPP: 30 SECONDS (ref 23.3–35.6)
ARTERIAL PATENCY WRIST A: POSITIVE
AST SERPL-CCNC: 26 U/L (ref 15–37)
AST SERPL-CCNC: 48 U/L (ref 15–37)
ATRIAL RATE: 48 BPM
B PARAPERT DNA SPEC QL NAA+PROBE: NOT DETECTED
B PERT DNA SPEC QL NAA+PROBE: NOT DETECTED
BASE EXCESS BLDA CALC-SCNC: -0.7 MMOL/L (ref ?–2)
BASE EXCESS BLDA CALC-SCNC: 0.2 MMOL/L (ref ?–2)
BASOPHILS # BLD AUTO: 0.02 X10(3) UL (ref 0–0.2)
BASOPHILS # BLD AUTO: 0.05 X10(3) UL (ref 0–0.2)
BASOPHILS # BLD AUTO: 0.05 X10(3) UL (ref 0–0.2)
BASOPHILS NFR BLD AUTO: 0.3 %
BASOPHILS NFR BLD AUTO: 0.6 %
BASOPHILS NFR BLD AUTO: 0.9 %
BILIRUB SERPL-MCNC: 0.4 MG/DL (ref 0.1–2)
BILIRUB SERPL-MCNC: 0.9 MG/DL (ref 0.1–2)
BLOOD TYPE BARCODE: 5100
BLOOD TYPE BARCODE: 5100
BODY TEMPERATURE: 98.6 F
BODY TEMPERATURE: 98.6 F
BUN BLD-MCNC: 10 MG/DL (ref 7–18)
BUN BLD-MCNC: 10 MG/DL (ref 7–18)
BUN BLD-MCNC: 17 MG/DL (ref 7–18)
C PNEUM DNA SPEC QL NAA+PROBE: NOT DETECTED
CA-I BLD-SCNC: 1.13 MMOL/L (ref 0.95–1.32)
CALCIUM BLD-MCNC: 8.2 MG/DL (ref 8.5–10.1)
CALCIUM BLD-MCNC: 8.3 MG/DL (ref 8.5–10.1)
CALCIUM BLD-MCNC: 8.3 MG/DL (ref 8.5–10.1)
CHLORIDE SERPL-SCNC: 100 MMOL/L (ref 98–112)
CHLORIDE SERPL-SCNC: 108 MMOL/L (ref 98–112)
CHLORIDE SERPL-SCNC: 98 MMOL/L (ref 98–112)
CHOLEST SERPL-MCNC: 121 MG/DL (ref ?–200)
CO2 SERPL-SCNC: 21 MMOL/L (ref 21–32)
CO2 SERPL-SCNC: 24 MMOL/L (ref 21–32)
CO2 SERPL-SCNC: 24 MMOL/L (ref 21–32)
COHGB MFR BLD: 0.9 % SAT (ref 0–3)
COHGB MFR BLD: 1.2 % SAT (ref 0–3)
CORONAVIRUS 229E PCR:: NOT DETECTED
CORONAVIRUS HKU1 PCR:: NOT DETECTED
CORONAVIRUS NL63 PCR:: NOT DETECTED
CORONAVIRUS OC43 PCR:: NOT DETECTED
CREAT BLD-MCNC: 0.84 MG/DL
CREAT BLD-MCNC: 0.88 MG/DL
CREAT BLD-MCNC: 1.23 MG/DL
EOSINOPHIL # BLD AUTO: 0 X10(3) UL (ref 0–0.7)
EOSINOPHIL # BLD AUTO: 0.02 X10(3) UL (ref 0–0.7)
EOSINOPHIL # BLD AUTO: 0.14 X10(3) UL (ref 0–0.7)
EOSINOPHIL NFR BLD AUTO: 0 %
EOSINOPHIL NFR BLD AUTO: 0.2 %
EOSINOPHIL NFR BLD AUTO: 2.5 %
ERYTHROCYTE [DISTWIDTH] IN BLOOD BY AUTOMATED COUNT: 13.4 %
ERYTHROCYTE [DISTWIDTH] IN BLOOD BY AUTOMATED COUNT: 13.5 %
ERYTHROCYTE [DISTWIDTH] IN BLOOD BY AUTOMATED COUNT: 14.3 %
EST. AVERAGE GLUCOSE BLD GHB EST-MCNC: 137 MG/DL (ref 68–126)
FIO2: 40 %
FIO2: 50 %
FLUAV RNA SPEC QL NAA+PROBE: NOT DETECTED
FLUBV RNA SPEC QL NAA+PROBE: NOT DETECTED
GFR SERPLBLD BASED ON 1.73 SQ M-ARVRAT: 59 ML/MIN/1.73M2 (ref 60–?)
GFR SERPLBLD BASED ON 1.73 SQ M-ARVRAT: 86 ML/MIN/1.73M2 (ref 60–?)
GFR SERPLBLD BASED ON 1.73 SQ M-ARVRAT: 87 ML/MIN/1.73M2 (ref 60–?)
GLOBULIN PLAS-MCNC: 2.9 G/DL (ref 2.8–4.4)
GLOBULIN PLAS-MCNC: 3.1 G/DL (ref 2.8–4.4)
GLUCOSE BLD-MCNC: 101 MG/DL (ref 70–99)
GLUCOSE BLD-MCNC: 106 MG/DL (ref 70–99)
GLUCOSE BLD-MCNC: 107 MG/DL (ref 70–99)
GLUCOSE BLD-MCNC: 110 MG/DL (ref 70–99)
GLUCOSE BLD-MCNC: 112 MG/DL (ref 70–99)
GLUCOSE BLD-MCNC: 118 MG/DL (ref 70–99)
GLUCOSE BLD-MCNC: 132 MG/DL (ref 70–99)
GLUCOSE BLD-MCNC: 179 MG/DL (ref 70–99)
GLUCOSE BLD-MCNC: 97 MG/DL (ref 70–99)
HBA1C MFR BLD: 6.4 % (ref ?–5.7)
HCO3 BLDA-SCNC: 24.4 MEQ/L (ref 21–27)
HCO3 BLDA-SCNC: 25.1 MEQ/L (ref 21–27)
HCT VFR BLD AUTO: 24.4 %
HCT VFR BLD AUTO: 26.2 %
HCT VFR BLD AUTO: 34.1 %
HDLC SERPL-MCNC: 95 MG/DL (ref 40–59)
HGB BLD-MCNC: 11.2 G/DL
HGB BLD-MCNC: 7.8 G/DL
HGB BLD-MCNC: 8.4 G/DL
HGB BLD-MCNC: 8.7 G/DL
HGB BLD-MCNC: 9.5 G/DL
IMM GRANULOCYTES # BLD AUTO: 0.03 X10(3) UL (ref 0–1)
IMM GRANULOCYTES # BLD AUTO: 0.03 X10(3) UL (ref 0–1)
IMM GRANULOCYTES # BLD AUTO: 0.04 X10(3) UL (ref 0–1)
IMM GRANULOCYTES NFR BLD: 0.4 %
IMM GRANULOCYTES NFR BLD: 0.5 %
IMM GRANULOCYTES NFR BLD: 0.5 %
INR BLD: 1.02 (ref 0.85–1.16)
LACTATE BLD-SCNC: 1.2 MMOL/L (ref 0.5–2)
LDLC SERPL CALC-MCNC: 15 MG/DL (ref ?–100)
LYMPHOCYTES # BLD AUTO: 0.57 X10(3) UL (ref 1–4)
LYMPHOCYTES # BLD AUTO: 0.99 X10(3) UL (ref 1–4)
LYMPHOCYTES # BLD AUTO: 1.7 X10(3) UL (ref 1–4)
LYMPHOCYTES NFR BLD AUTO: 12 %
LYMPHOCYTES NFR BLD AUTO: 29.9 %
LYMPHOCYTES NFR BLD AUTO: 7.2 %
MCH RBC QN AUTO: 28.3 PG (ref 26–34)
MCH RBC QN AUTO: 28.8 PG (ref 26–34)
MCH RBC QN AUTO: 29.1 PG (ref 26–34)
MCHC RBC AUTO-ENTMCNC: 32 G/DL (ref 31–37)
MCHC RBC AUTO-ENTMCNC: 32.8 G/DL (ref 31–37)
MCHC RBC AUTO-ENTMCNC: 33.2 G/DL (ref 31–37)
MCV RBC AUTO: 87.6 FL
MCV RBC AUTO: 87.7 FL
MCV RBC AUTO: 88.4 FL
METAPNEUMOVIRUS PCR:: NOT DETECTED
METHGB MFR BLD: 0 % SAT (ref 0.4–1.5)
METHGB MFR BLD: 0 % SAT (ref 0.4–1.5)
MONOCYTES # BLD AUTO: 0.57 X10(3) UL (ref 0.1–1)
MONOCYTES # BLD AUTO: 0.68 X10(3) UL (ref 0.1–1)
MONOCYTES # BLD AUTO: 0.74 X10(3) UL (ref 0.1–1)
MONOCYTES NFR BLD AUTO: 10 %
MONOCYTES NFR BLD AUTO: 8.5 %
MONOCYTES NFR BLD AUTO: 8.9 %
MYCOPLASMA PNEUMONIA PCR:: NOT DETECTED
NEUTROPHILS # BLD AUTO: 3.19 X10 (3) UL (ref 1.5–7.7)
NEUTROPHILS # BLD AUTO: 3.19 X10(3) UL (ref 1.5–7.7)
NEUTROPHILS # BLD AUTO: 6.44 X10 (3) UL (ref 1.5–7.7)
NEUTROPHILS # BLD AUTO: 6.44 X10(3) UL (ref 1.5–7.7)
NEUTROPHILS # BLD AUTO: 6.66 X10 (3) UL (ref 1.5–7.7)
NEUTROPHILS # BLD AUTO: 6.66 X10(3) UL (ref 1.5–7.7)
NEUTROPHILS NFR BLD AUTO: 56.2 %
NEUTROPHILS NFR BLD AUTO: 77.8 %
NEUTROPHILS NFR BLD AUTO: 83.6 %
NONHDLC SERPL-MCNC: 26 MG/DL (ref ?–130)
OSMOLALITY SERPL CALC.SUM OF ELEC: 269 MOSM/KG (ref 275–295)
OSMOLALITY SERPL CALC.SUM OF ELEC: 272 MOSM/KG (ref 275–295)
OSMOLALITY SERPL CALC.SUM OF ELEC: 288 MOSM/KG (ref 275–295)
OSMOLALITY SERPL: 278 MOSM/KG (ref 280–300)
OSMOLALITY SERPL: 282 MOSM/KG (ref 280–300)
OSMOLALITY SERPL: 283 MOSM/KG (ref 280–300)
OSMOLALITY SERPL: 286 MOSM/KG (ref 280–300)
OXYHGB MFR BLDA: 98.7 % (ref 92–100)
OXYHGB MFR BLDA: 98.8 % (ref 92–100)
P AXIS: 68 DEGREES
P-R INTERVAL: 206 MS
P/F RATIO: 498 MMHG
PARAINFLUENZA 1 PCR:: NOT DETECTED
PARAINFLUENZA 2 PCR:: NOT DETECTED
PARAINFLUENZA 3 PCR:: NOT DETECTED
PARAINFLUENZA 4 PCR:: NOT DETECTED
PCO2 BLDA: 32 MM HG (ref 35–45)
PCO2 BLDA: 38 MM HG (ref 35–45)
PEEP: 5 CM H2O
PEEP: 5 CM H2O
PH BLDA: 7.42 [PH] (ref 7.35–7.45)
PH BLDA: 7.46 [PH] (ref 7.35–7.45)
PLATELET # BLD AUTO: 191 10(3)UL (ref 150–450)
PLATELET # BLD AUTO: 215 10(3)UL (ref 150–450)
PLATELET # BLD AUTO: 234 10(3)UL (ref 150–450)
PO2 BLDA: 199 MM HG (ref 80–100)
PO2 BLDA: 238 MM HG (ref 80–100)
POTASSIUM BLD-SCNC: 4.1 MMOL/L (ref 3.6–5.1)
POTASSIUM SERPL-SCNC: 3.8 MMOL/L (ref 3.5–5.1)
POTASSIUM SERPL-SCNC: 4.5 MMOL/L (ref 3.5–5.1)
POTASSIUM SERPL-SCNC: 4.5 MMOL/L (ref 3.5–5.1)
PROT SERPL-MCNC: 5.7 G/DL (ref 6.4–8.2)
PROT SERPL-MCNC: 6.3 G/DL (ref 6.4–8.2)
PROTHROMBIN TIME: 13.4 SECONDS (ref 11.6–14.8)
Q-T INTERVAL: 464 MS
QRS DURATION: 84 MS
QTC CALCULATION (BEZET): 414 MS
R AXIS: 79 DEGREES
RBC # BLD AUTO: 2.76 X10(6)UL
RBC # BLD AUTO: 2.99 X10(6)UL
RBC # BLD AUTO: 3.89 X10(6)UL
RH BLOOD TYPE: POSITIVE
RHINOVIRUS/ENTERO PCR:: NOT DETECTED
RSV RNA SPEC QL NAA+PROBE: NOT DETECTED
SARS-COV-2 RNA NPH QL NAA+NON-PROBE: NOT DETECTED
SARS-COV-2 RNA RESP QL NAA+PROBE: NOT DETECTED
SODIUM BLD-SCNC: 124 MMOL/L (ref 135–145)
SODIUM SERPL-SCNC: 128 MMOL/L (ref 136–145)
SODIUM SERPL-SCNC: 129 MMOL/L (ref 136–145)
SODIUM SERPL-SCNC: 131 MMOL/L (ref 136–145)
SODIUM SERPL-SCNC: 131 MMOL/L (ref 136–145)
SODIUM SERPL-SCNC: 132 MMOL/L (ref 136–145)
SODIUM SERPL-SCNC: 132 MMOL/L (ref 136–145)
SODIUM SERPL-SCNC: 135 MMOL/L (ref 136–145)
SODIUM SERPL-SCNC: 138 MMOL/L (ref 136–145)
SODIUM SERPL-SCNC: 138 MMOL/L (ref 136–145)
T AXIS: 76 DEGREES
TIDAL VOLUME: 400 ML
TIDAL VOLUME: 400 ML
TRIGL SERPL-MCNC: 41 MG/DL (ref 30–149)
TRIGL SERPL-MCNC: 41 MG/DL (ref 30–149)
TROPONIN I HIGH SENSITIVITY: 14 NG/L
VENT RATE: 16 /MIN
VENT RATE: 16 /MIN
VENTRICULAR RATE: 48 BPM
VLDLC SERPL CALC-MCNC: 5 MG/DL (ref 0–30)
WBC # BLD AUTO: 5.7 X10(3) UL (ref 4–11)
WBC # BLD AUTO: 8 X10(3) UL (ref 4–11)
WBC # BLD AUTO: 8.3 X10(3) UL (ref 4–11)

## 2022-01-01 PROCEDURE — 93306 TTE W/DOPPLER COMPLETE: CPT | Performed by: HOSPITALIST

## 2022-01-01 PROCEDURE — 99222 1ST HOSP IP/OBS MODERATE 55: CPT | Performed by: NURSE PRACTITIONER

## 2022-01-01 PROCEDURE — 73551 X-RAY EXAM OF FEMUR 1: CPT | Performed by: EMERGENCY MEDICINE

## 2022-01-01 PROCEDURE — 70450 CT HEAD/BRAIN W/O DYE: CPT | Performed by: NURSE PRACTITIONER

## 2022-01-01 PROCEDURE — 99292 CRITICAL CARE ADDL 30 MIN: CPT | Performed by: INTERNAL MEDICINE

## 2022-01-01 PROCEDURE — 99291 CRITICAL CARE FIRST HOUR: CPT | Performed by: INTERNAL MEDICINE

## 2022-01-01 PROCEDURE — 99222 1ST HOSP IP/OBS MODERATE 55: CPT | Performed by: STUDENT IN AN ORGANIZED HEALTH CARE EDUCATION/TRAINING PROGRAM

## 2022-01-01 PROCEDURE — 71045 X-RAY EXAM CHEST 1 VIEW: CPT | Performed by: NURSE PRACTITIONER

## 2022-01-01 PROCEDURE — 5A1945Z RESPIRATORY VENTILATION, 24-96 CONSECUTIVE HOURS: ICD-10-PCS | Performed by: HOSPITALIST

## 2022-01-01 PROCEDURE — 71045 X-RAY EXAM CHEST 1 VIEW: CPT | Performed by: EMERGENCY MEDICINE

## 2022-01-01 PROCEDURE — 99232 SBSQ HOSP IP/OBS MODERATE 35: CPT | Performed by: STUDENT IN AN ORGANIZED HEALTH CARE EDUCATION/TRAINING PROGRAM

## 2022-01-01 PROCEDURE — 99223 1ST HOSP IP/OBS HIGH 75: CPT | Performed by: HOSPITALIST

## 2022-01-01 PROCEDURE — 70450 CT HEAD/BRAIN W/O DYE: CPT | Performed by: EMERGENCY MEDICINE

## 2022-01-01 PROCEDURE — 99232 SBSQ HOSP IP/OBS MODERATE 35: CPT | Performed by: HOSPITALIST

## 2022-01-01 PROCEDURE — 99291 CRITICAL CARE FIRST HOUR: CPT | Performed by: NURSE PRACTITIONER

## 2022-01-01 PROCEDURE — 99233 SBSQ HOSP IP/OBS HIGH 50: CPT | Performed by: HOSPITALIST

## 2022-01-01 PROCEDURE — 72125 CT NECK SPINE W/O DYE: CPT | Performed by: EMERGENCY MEDICINE

## 2022-01-01 PROCEDURE — 99239 HOSP IP/OBS DSCHRG MGMT >30: CPT | Performed by: HOSPITALIST

## 2022-01-01 PROCEDURE — 0BH17EZ INSERTION OF ENDOTRACHEAL AIRWAY INTO TRACHEA, VIA NATURAL OR ARTIFICIAL OPENING: ICD-10-PCS | Performed by: HOSPITALIST

## 2022-01-01 RX ORDER — FINASTERIDE 5 MG/1
5 TABLET, FILM COATED ORAL DAILY
COMMUNITY

## 2022-01-01 RX ORDER — ACETAMINOPHEN 160 MG/5ML
650 SOLUTION ORAL EVERY 4 HOURS PRN
Status: DISCONTINUED | OUTPATIENT
Start: 2022-01-01 | End: 2022-01-01

## 2022-01-01 RX ORDER — LISINOPRIL 10 MG/1
10 TABLET ORAL DAILY
COMMUNITY

## 2022-01-01 RX ORDER — METOPROLOL SUCCINATE 25 MG/1
25 TABLET, EXTENDED RELEASE ORAL DAILY
COMMUNITY

## 2022-01-01 RX ORDER — RASAGILINE 0.5 MG/1
1 TABLET ORAL DAILY
COMMUNITY

## 2022-01-01 RX ORDER — ONDANSETRON 2 MG/ML
4 INJECTION INTRAMUSCULAR; INTRAVENOUS EVERY 6 HOURS PRN
Status: DISCONTINUED | OUTPATIENT
Start: 2022-01-01 | End: 2022-01-01

## 2022-01-01 RX ORDER — MANNITOL 20 G/100ML
1 INJECTION, SOLUTION INTRAVENOUS ONCE
Status: COMPLETED | OUTPATIENT
Start: 2022-01-01 | End: 2022-01-01

## 2022-01-01 RX ORDER — CHLORHEXIDINE GLUCONATE 0.12 MG/ML
15 RINSE ORAL
Status: DISCONTINUED | OUTPATIENT
Start: 2022-01-01 | End: 2022-01-01

## 2022-01-01 RX ORDER — ACETAMINOPHEN 650 MG/1
650 SUPPOSITORY RECTAL EVERY 4 HOURS PRN
Status: DISCONTINUED | OUTPATIENT
Start: 2022-01-01 | End: 2022-01-01

## 2022-01-01 RX ORDER — BISACODYL 10 MG
10 SUPPOSITORY, RECTAL RECTAL
Status: DISCONTINUED | OUTPATIENT
Start: 2022-01-01 | End: 2022-01-01

## 2022-01-01 RX ORDER — LEVETIRACETAM 500 MG/5ML
500 INJECTION, SOLUTION, CONCENTRATE INTRAVENOUS EVERY 12 HOURS
Status: DISCONTINUED | OUTPATIENT
Start: 2022-01-01 | End: 2022-01-01

## 2022-01-01 RX ORDER — DEXTROSE AND SODIUM CHLORIDE 5; .45 G/100ML; G/100ML
INJECTION, SOLUTION INTRAVENOUS CONTINUOUS
Status: ACTIVE | OUTPATIENT
Start: 2022-01-01 | End: 2022-01-01

## 2022-01-01 RX ORDER — MORPHINE SULFATE 2 MG/ML
1 INJECTION, SOLUTION INTRAMUSCULAR; INTRAVENOUS
Status: DISCONTINUED | OUTPATIENT
Start: 2022-01-01 | End: 2022-01-01

## 2022-01-01 RX ORDER — HALOPERIDOL 5 MG/ML
2 INJECTION INTRAMUSCULAR
Status: DISCONTINUED | OUTPATIENT
Start: 2022-01-01 | End: 2022-01-01

## 2022-01-01 RX ORDER — SENNOSIDES 8.6 MG
17.2 TABLET ORAL NIGHTLY PRN
Status: DISCONTINUED | OUTPATIENT
Start: 2022-01-01 | End: 2022-01-01

## 2022-01-01 RX ORDER — POLYETHYLENE GLYCOL 3350 17 G/17G
17 POWDER, FOR SOLUTION ORAL DAILY PRN
Status: DISCONTINUED | OUTPATIENT
Start: 2022-01-01 | End: 2022-01-01

## 2022-01-01 RX ORDER — SCOLOPAMINE TRANSDERMAL SYSTEM 1 MG/1
1 PATCH, EXTENDED RELEASE TRANSDERMAL
Status: DISCONTINUED | OUTPATIENT
Start: 2022-01-01 | End: 2022-01-01

## 2022-01-01 RX ORDER — SODIUM CHLORIDE 9 MG/ML
INJECTION, SOLUTION INTRAVENOUS CONTINUOUS
Status: DISCONTINUED | OUTPATIENT
Start: 2022-01-01 | End: 2022-01-01

## 2022-01-01 RX ORDER — CLOPIDOGREL BISULFATE 75 MG/1
75 TABLET ORAL DAILY
COMMUNITY

## 2022-01-01 RX ORDER — ACETAMINOPHEN 10 MG/ML
1000 INJECTION, SOLUTION INTRAVENOUS EVERY 6 HOURS PRN
Status: DISCONTINUED | OUTPATIENT
Start: 2022-01-01 | End: 2022-01-01

## 2022-01-01 RX ORDER — LABETALOL HYDROCHLORIDE 5 MG/ML
10 INJECTION, SOLUTION INTRAVENOUS EVERY 4 HOURS PRN
Status: DISCONTINUED | OUTPATIENT
Start: 2022-01-01 | End: 2022-01-01

## 2022-01-01 RX ORDER — ACETAMINOPHEN 325 MG/1
650 TABLET ORAL EVERY 4 HOURS PRN
Status: DISCONTINUED | OUTPATIENT
Start: 2022-01-01 | End: 2022-01-01

## 2022-01-01 RX ORDER — SODIUM CHLORIDE 9 MG/ML
1000 INJECTION, SOLUTION INTRAVENOUS ONCE
Status: COMPLETED | OUTPATIENT
Start: 2022-01-01 | End: 2022-01-01

## 2022-01-01 RX ORDER — LABETALOL HYDROCHLORIDE 5 MG/ML
10 INJECTION, SOLUTION INTRAVENOUS EVERY 10 MIN PRN
Status: COMPLETED | OUTPATIENT
Start: 2022-01-01 | End: 2022-01-01

## 2022-01-01 RX ORDER — ENTACAPONE 200 MG/1
200 TABLET ORAL 3 TIMES DAILY
COMMUNITY

## 2022-01-01 RX ORDER — MANNITOL 20 G/100ML
20 INJECTION, SOLUTION INTRAVENOUS EVERY 6 HOURS
Status: DISCONTINUED | OUTPATIENT
Start: 2022-01-01 | End: 2022-01-01

## 2022-01-01 RX ORDER — LEVETIRACETAM 500 MG/5ML
1000 INJECTION, SOLUTION, CONCENTRATE INTRAVENOUS ONCE
Status: COMPLETED | OUTPATIENT
Start: 2022-01-01 | End: 2022-01-01

## 2022-01-01 RX ORDER — ETOMIDATE 2 MG/ML
INJECTION INTRAVENOUS
Status: COMPLETED | OUTPATIENT
Start: 2022-01-01 | End: 2022-01-01

## 2022-01-01 RX ORDER — HYDRALAZINE HYDROCHLORIDE 20 MG/ML
10 INJECTION INTRAMUSCULAR; INTRAVENOUS EVERY 2 HOUR PRN
Status: DISCONTINUED | OUTPATIENT
Start: 2022-01-01 | End: 2022-01-01

## 2022-01-01 RX ORDER — ROSUVASTATIN CALCIUM 20 MG/1
20 TABLET, COATED ORAL NIGHTLY
COMMUNITY

## 2022-01-01 RX ORDER — SODIUM PHOSPHATE, DIBASIC AND SODIUM PHOSPHATE, MONOBASIC 7; 19 G/133ML; G/133ML
1 ENEMA RECTAL ONCE AS NEEDED
Status: DISCONTINUED | OUTPATIENT
Start: 2022-01-01 | End: 2022-01-01

## 2022-01-01 RX ORDER — METOCLOPRAMIDE HYDROCHLORIDE 5 MG/ML
10 INJECTION INTRAMUSCULAR; INTRAVENOUS EVERY 8 HOURS PRN
Status: DISCONTINUED | OUTPATIENT
Start: 2022-01-01 | End: 2022-01-01

## 2022-01-01 RX ORDER — ONDANSETRON 4 MG/1
4 TABLET, ORALLY DISINTEGRATING ORAL EVERY 6 HOURS PRN
Status: DISCONTINUED | OUTPATIENT
Start: 2022-01-01 | End: 2022-01-01

## 2022-01-01 RX ORDER — GLYCOPYRROLATE 0.2 MG/ML
0.2 INJECTION, SOLUTION INTRAMUSCULAR; INTRAVENOUS
Status: DISCONTINUED | OUTPATIENT
Start: 2022-01-01 | End: 2022-01-01

## 2022-01-01 RX ORDER — LABETALOL HYDROCHLORIDE 5 MG/ML
INJECTION, SOLUTION INTRAVENOUS
Status: DISPENSED
Start: 2022-01-01 | End: 2022-01-01

## 2022-01-01 RX ORDER — SODIUM CHLORIDE 9 MG/ML
1000 INJECTION, SOLUTION INTRAVENOUS ONCE
Status: DISCONTINUED | OUTPATIENT
Start: 2022-01-01 | End: 2022-01-01

## 2022-01-01 RX ORDER — TEMAZEPAM 15 MG/1
15 CAPSULE ORAL NIGHTLY PRN
COMMUNITY

## 2022-01-01 RX ORDER — LORAZEPAM 2 MG/ML
1 INJECTION INTRAMUSCULAR
Status: DISCONTINUED | OUTPATIENT
Start: 2022-01-01 | End: 2022-01-01

## 2022-01-01 RX ORDER — HALOPERIDOL 5 MG/ML
1 INJECTION INTRAMUSCULAR
Status: DISCONTINUED | OUTPATIENT
Start: 2022-01-01 | End: 2022-01-01

## 2022-01-11 ENCOUNTER — TELEPHONE (OUTPATIENT)
Dept: PHYSICAL THERAPY | Facility: HOSPITAL | Age: 82
End: 2022-01-11

## 2022-01-11 NOTE — TELEPHONE ENCOUNTER
Attempted to contact patient at listed preferred number home but VM box not in service. Left VM regarding LSVT PT scheduled.  Noticed patient has cancelled first appointment via [address] without reason given, want to make sure patient still appropriate to be

## 2022-01-17 ENCOUNTER — TELEPHONE (OUTPATIENT)
Dept: PHYSICAL THERAPY | Facility: HOSPITAL | Age: 82
End: 2022-01-17

## 2022-01-17 ENCOUNTER — APPOINTMENT (OUTPATIENT)
Dept: PHYSICAL THERAPY | Facility: HOSPITAL | Age: 82
End: 2022-01-17
Attending: HOSPITALIST

## 2022-01-20 ENCOUNTER — APPOINTMENT (OUTPATIENT)
Dept: PHYSICAL THERAPY | Facility: HOSPITAL | Age: 82
End: 2022-01-20
Attending: HOSPITALIST

## 2022-01-24 ENCOUNTER — APPOINTMENT (OUTPATIENT)
Dept: PHYSICAL THERAPY | Facility: HOSPITAL | Age: 82
End: 2022-01-24
Attending: HOSPITALIST

## 2022-01-25 ENCOUNTER — APPOINTMENT (OUTPATIENT)
Dept: PHYSICAL THERAPY | Facility: HOSPITAL | Age: 82
End: 2022-01-25
Attending: HOSPITALIST

## 2022-01-26 ENCOUNTER — APPOINTMENT (OUTPATIENT)
Dept: PHYSICAL THERAPY | Facility: HOSPITAL | Age: 82
End: 2022-01-26
Attending: HOSPITALIST

## 2022-01-27 ENCOUNTER — APPOINTMENT (OUTPATIENT)
Dept: PHYSICAL THERAPY | Facility: HOSPITAL | Age: 82
End: 2022-01-27
Attending: HOSPITALIST

## 2022-01-31 ENCOUNTER — APPOINTMENT (OUTPATIENT)
Dept: PHYSICAL THERAPY | Facility: HOSPITAL | Age: 82
End: 2022-01-31
Attending: HOSPITALIST

## 2022-02-01 ENCOUNTER — APPOINTMENT (OUTPATIENT)
Dept: PHYSICAL THERAPY | Facility: HOSPITAL | Age: 82
End: 2022-02-01
Attending: HOSPITALIST
Payer: MEDICARE

## 2022-02-02 ENCOUNTER — APPOINTMENT (OUTPATIENT)
Dept: PHYSICAL THERAPY | Facility: HOSPITAL | Age: 82
End: 2022-02-02
Attending: HOSPITALIST
Payer: MEDICARE

## 2022-02-07 ENCOUNTER — APPOINTMENT (OUTPATIENT)
Dept: PHYSICAL THERAPY | Facility: HOSPITAL | Age: 82
End: 2022-02-07
Attending: HOSPITALIST
Payer: MEDICARE

## 2022-02-08 ENCOUNTER — APPOINTMENT (OUTPATIENT)
Dept: PHYSICAL THERAPY | Facility: HOSPITAL | Age: 82
End: 2022-02-08
Attending: HOSPITALIST
Payer: MEDICARE

## 2022-02-09 ENCOUNTER — APPOINTMENT (OUTPATIENT)
Dept: PHYSICAL THERAPY | Facility: HOSPITAL | Age: 82
End: 2022-02-09
Attending: HOSPITALIST
Payer: MEDICARE

## 2022-02-10 ENCOUNTER — APPOINTMENT (OUTPATIENT)
Dept: PHYSICAL THERAPY | Facility: HOSPITAL | Age: 82
End: 2022-02-10
Attending: HOSPITALIST
Payer: MEDICARE

## 2022-02-11 ENCOUNTER — TELEPHONE (OUTPATIENT)
Dept: PHYSICAL THERAPY | Facility: HOSPITAL | Age: 82
End: 2022-02-11

## 2022-02-11 NOTE — TELEPHONE ENCOUNTER
Called patient to confirm next week's PT appointment due to hx of multiple late cancels. Confirmed appointment.

## 2022-02-14 ENCOUNTER — APPOINTMENT (OUTPATIENT)
Dept: PHYSICAL THERAPY | Facility: HOSPITAL | Age: 82
End: 2022-02-14
Attending: HOSPITALIST
Payer: MEDICARE

## 2022-02-14 ENCOUNTER — TELEPHONE (OUTPATIENT)
Dept: PHYSICAL THERAPY | Facility: HOSPITAL | Age: 82
End: 2022-02-14

## 2022-02-14 NOTE — TELEPHONE ENCOUNTER
PT called to f/u on patient cancel for today. Denies COVID symptoms but is \"not feeling well\". Discussed that patient only has 3 remaining visits scheduled and his multiple cancels. Discussed LSVT program and consisting of 17 visits. Discussed POC, patient elects to cancel remaining visits and then call back to re-schedule all when he is feeling well.

## 2022-02-15 ENCOUNTER — APPOINTMENT (OUTPATIENT)
Dept: PHYSICAL THERAPY | Facility: HOSPITAL | Age: 82
End: 2022-02-15
Attending: HOSPITALIST
Payer: MEDICARE

## 2022-02-15 ENCOUNTER — LAB ENCOUNTER (OUTPATIENT)
Dept: LAB | Facility: HOSPITAL | Age: 82
End: 2022-02-15
Attending: INTERNAL MEDICINE
Payer: MEDICARE

## 2022-02-15 DIAGNOSIS — Z01.818 PREOP EXAMINATION: ICD-10-CM

## 2022-02-15 DIAGNOSIS — Z11.4 ENCOUNTER FOR SCREENING FOR HUMAN IMMUNODEFICIENCY VIRUS (HIV): ICD-10-CM

## 2022-02-15 LAB — SARS-COV-2 RNA RESP QL NAA+PROBE: NOT DETECTED

## 2022-02-16 ENCOUNTER — APPOINTMENT (OUTPATIENT)
Dept: PHYSICAL THERAPY | Facility: HOSPITAL | Age: 82
End: 2022-02-16
Attending: HOSPITALIST
Payer: MEDICARE

## 2022-02-17 ENCOUNTER — APPOINTMENT (OUTPATIENT)
Dept: PHYSICAL THERAPY | Facility: HOSPITAL | Age: 82
End: 2022-02-17
Attending: HOSPITALIST
Payer: MEDICARE

## 2022-02-18 ENCOUNTER — HOSPITAL ENCOUNTER (OUTPATIENT)
Dept: GENERAL RADIOLOGY | Facility: HOSPITAL | Age: 82
Discharge: HOME | End: 2022-02-18
Attending: INTERNAL MEDICINE
Payer: MEDICARE

## 2022-02-18 DIAGNOSIS — R05.9 COUGH: ICD-10-CM

## 2022-02-18 PROCEDURE — 74230 X-RAY XM SWLNG FUNCJ C+: CPT | Performed by: INTERNAL MEDICINE

## 2022-02-18 PROCEDURE — 92611 MOTION FLUOROSCOPY/SWALLOW: CPT

## 2022-03-08 ENCOUNTER — HOSPITAL ENCOUNTER (OUTPATIENT)
Dept: CV DIAGNOSTICS | Age: 82
Discharge: HOME | End: 2022-03-08
Attending: INTERNAL MEDICINE
Payer: MEDICARE

## 2022-03-08 DIAGNOSIS — I10 ESSENTIAL HYPERTENSION: ICD-10-CM

## 2022-03-08 DIAGNOSIS — R53.83 FATIGUE, UNSPECIFIED TYPE: ICD-10-CM

## 2022-03-08 PROCEDURE — 93306 TTE W/DOPPLER COMPLETE: CPT | Performed by: INTERNAL MEDICINE

## 2022-04-11 ENCOUNTER — HOSPITAL ENCOUNTER (OUTPATIENT)
Dept: ULTRASOUND IMAGING | Age: 82
Discharge: HOME | End: 2022-04-11
Attending: FAMILY MEDICINE
Payer: MEDICARE

## 2022-04-11 DIAGNOSIS — E78.00 PURE HYPERCHOLESTEROLEMIA: ICD-10-CM

## 2022-04-11 DIAGNOSIS — Z13.6 SCREENING FOR CARDIOVASCULAR CONDITION: ICD-10-CM

## 2022-04-11 PROCEDURE — 93880 EXTRACRANIAL BILAT STUDY: CPT | Performed by: FAMILY MEDICINE

## 2022-05-07 ENCOUNTER — APPOINTMENT (OUTPATIENT)
Dept: GENERAL RADIOLOGY | Age: 82
End: 2022-05-07
Attending: EMERGENCY MEDICINE
Payer: MEDICARE

## 2022-05-07 ENCOUNTER — HOSPITAL ENCOUNTER (EMERGENCY)
Age: 82
Discharge: HOME | End: 2022-05-07
Attending: EMERGENCY MEDICINE
Payer: MEDICARE

## 2022-05-07 VITALS
HEART RATE: 70 BPM | BODY MASS INDEX: 19.39 KG/M2 | RESPIRATION RATE: 16 BRPM | HEIGHT: 69 IN | TEMPERATURE: 98 F | WEIGHT: 130.94 LBS | DIASTOLIC BLOOD PRESSURE: 88 MMHG | SYSTOLIC BLOOD PRESSURE: 193 MMHG | OXYGEN SATURATION: 96 %

## 2022-05-07 DIAGNOSIS — J98.01 ACUTE BRONCHOSPASM: Primary | ICD-10-CM

## 2022-05-07 LAB
ALBUMIN SERPL-MCNC: 3.6 G/DL (ref 3.4–5)
ALBUMIN/GLOB SERPL: 1 {RATIO} (ref 1–2)
ALP LIVER SERPL-CCNC: 68 U/L
ALT SERPL-CCNC: 10 U/L
ANION GAP SERPL CALC-SCNC: 4 MMOL/L (ref 0–18)
AST SERPL-CCNC: 22 U/L (ref 15–37)
BASOPHILS # BLD AUTO: 0.05 X10(3) UL (ref 0–0.2)
BASOPHILS NFR BLD AUTO: 0.9 %
BILIRUB SERPL-MCNC: 0.4 MG/DL (ref 0.1–2)
BUN BLD-MCNC: 12 MG/DL (ref 7–18)
CALCIUM BLD-MCNC: 8.9 MG/DL (ref 8.5–10.1)
CHLORIDE SERPL-SCNC: 99 MMOL/L (ref 98–112)
CO2 SERPL-SCNC: 28 MMOL/L (ref 21–32)
CREAT BLD-MCNC: 0.92 MG/DL
EOSINOPHIL # BLD AUTO: 0.13 X10(3) UL (ref 0–0.7)
EOSINOPHIL NFR BLD AUTO: 2.2 %
ERYTHROCYTE [DISTWIDTH] IN BLOOD BY AUTOMATED COUNT: 14.5 %
GLOBULIN PLAS-MCNC: 3.6 G/DL (ref 2.8–4.4)
GLUCOSE BLD-MCNC: 103 MG/DL (ref 70–99)
HCT VFR BLD AUTO: 40.2 %
HGB BLD-MCNC: 12.7 G/DL
IMM GRANULOCYTES # BLD AUTO: 0.03 X10(3) UL (ref 0–1)
IMM GRANULOCYTES NFR BLD: 0.5 %
LYMPHOCYTES # BLD AUTO: 1.09 X10(3) UL (ref 1–4)
LYMPHOCYTES NFR BLD AUTO: 18.9 %
MCH RBC QN AUTO: 27.4 PG (ref 26–34)
MCHC RBC AUTO-ENTMCNC: 31.6 G/DL (ref 31–37)
MCV RBC AUTO: 86.8 FL
MONOCYTES # BLD AUTO: 0.63 X10(3) UL (ref 0.1–1)
MONOCYTES NFR BLD AUTO: 10.9 %
NEUTROPHILS # BLD AUTO: 3.85 X10 (3) UL (ref 1.5–7.7)
NEUTROPHILS # BLD AUTO: 3.85 X10(3) UL (ref 1.5–7.7)
NEUTROPHILS NFR BLD AUTO: 66.6 %
OSMOLALITY SERPL CALC.SUM OF ELEC: 272 MOSM/KG (ref 275–295)
PLATELET # BLD AUTO: 204 10(3)UL (ref 150–450)
POTASSIUM SERPL-SCNC: 4.6 MMOL/L (ref 3.5–5.1)
PROT SERPL-MCNC: 7.2 G/DL (ref 6.4–8.2)
RBC # BLD AUTO: 4.63 X10(6)UL
SARS-COV-2 RNA RESP QL NAA+PROBE: NOT DETECTED
SODIUM SERPL-SCNC: 131 MMOL/L (ref 136–145)
WBC # BLD AUTO: 5.8 X10(3) UL (ref 4–11)

## 2022-05-07 PROCEDURE — 71045 X-RAY EXAM CHEST 1 VIEW: CPT | Performed by: EMERGENCY MEDICINE

## 2022-05-07 PROCEDURE — 85025 COMPLETE CBC W/AUTO DIFF WBC: CPT | Performed by: EMERGENCY MEDICINE

## 2022-05-07 PROCEDURE — 36415 COLL VENOUS BLD VENIPUNCTURE: CPT

## 2022-05-07 PROCEDURE — 99284 EMERGENCY DEPT VISIT MOD MDM: CPT

## 2022-05-07 PROCEDURE — 94640 AIRWAY INHALATION TREATMENT: CPT

## 2022-05-07 PROCEDURE — 80053 COMPREHEN METABOLIC PANEL: CPT | Performed by: EMERGENCY MEDICINE

## 2022-05-07 RX ORDER — ALBUTEROL SULFATE 90 UG/1
4 AEROSOL, METERED RESPIRATORY (INHALATION) ONCE
Status: COMPLETED | OUTPATIENT
Start: 2022-05-07 | End: 2022-05-07

## 2022-05-07 NOTE — ED INITIAL ASSESSMENT (HPI)
Coughing and marisa since past few days, worse today. Followed up with [facility] 5/4/22. Denies fevers, non productive cough. C/o \"heavy breathing\" denies chest discomfort.

## 2022-06-10 ENCOUNTER — HOSPITAL ENCOUNTER (EMERGENCY)
Facility: HOSPITAL | Age: 82
Discharge: LEFT BEFORE DISCHARGE | End: 2022-06-10
Payer: MEDICARE

## 2022-06-10 ENCOUNTER — HOSPITAL ENCOUNTER (INPATIENT)
Facility: HOSPITAL | Age: 82
LOS: 1 days | Discharge: HOME | End: 2022-06-12
Attending: EMERGENCY MEDICINE | Admitting: INTERNAL MEDICINE
Payer: MEDICARE

## 2022-06-10 ENCOUNTER — HOSPITAL ENCOUNTER (OUTPATIENT)
Facility: HOSPITAL | Age: 82
Setting detail: OBSERVATION
Discharge: HOME | End: 2022-06-12
Attending: EMERGENCY MEDICINE | Admitting: INTERNAL MEDICINE
Payer: MEDICARE

## 2022-06-10 VITALS
RESPIRATION RATE: 18 BRPM | TEMPERATURE: 98 F | DIASTOLIC BLOOD PRESSURE: 84 MMHG | SYSTOLIC BLOOD PRESSURE: 208 MMHG | HEIGHT: 69 IN | OXYGEN SATURATION: 97 % | BODY MASS INDEX: 19.26 KG/M2 | HEART RATE: 65 BPM | WEIGHT: 130 LBS

## 2022-06-10 DIAGNOSIS — R06.02 SHORTNESS OF BREATH: Primary | ICD-10-CM

## 2022-06-10 DIAGNOSIS — G20 PARKINSON'S DISEASE (HCC): ICD-10-CM

## 2022-06-10 DIAGNOSIS — E87.1 HYPONATREMIA: ICD-10-CM

## 2022-06-11 ENCOUNTER — APPOINTMENT (OUTPATIENT)
Dept: GENERAL RADIOLOGY | Age: 82
End: 2022-06-11
Payer: MEDICARE

## 2022-06-11 PROBLEM — I25.10 CORONARY ARTERY DISEASE: Status: ACTIVE | Noted: 2021-03-19

## 2022-06-11 PROBLEM — R06.02 SHORTNESS OF BREATH: Status: ACTIVE | Noted: 2022-06-11

## 2022-06-11 PROBLEM — R06.02 SHORTNESS OF BREATH: Status: ACTIVE | Noted: 2022-01-01

## 2022-06-11 LAB
ALBUMIN SERPL-MCNC: 3.6 G/DL (ref 3.4–5)
ALBUMIN/GLOB SERPL: 1 {RATIO} (ref 1–2)
ALP LIVER SERPL-CCNC: 65 U/L
ALT SERPL-CCNC: 7 U/L
ANION GAP SERPL CALC-SCNC: 6 MMOL/L (ref 0–18)
APTT PPP: 33.4 SECONDS (ref 23.3–35.6)
AST SERPL-CCNC: 25 U/L (ref 15–37)
BASOPHILS # BLD AUTO: 0.06 X10(3) UL (ref 0–0.2)
BASOPHILS NFR BLD AUTO: 1.1 %
BILIRUB SERPL-MCNC: 0.7 MG/DL (ref 0.1–2)
BUN BLD-MCNC: 13 MG/DL (ref 7–18)
CALCIUM BLD-MCNC: 9 MG/DL (ref 8.5–10.1)
CHLORIDE SERPL-SCNC: 94 MMOL/L (ref 98–112)
CO2 SERPL-SCNC: 28 MMOL/L (ref 21–32)
CREAT BLD-MCNC: 0.98 MG/DL
D DIMER PPP FEU-MCNC: 0.34 UG/ML FEU (ref ?–0.81)
EOSINOPHIL # BLD AUTO: 0.11 X10(3) UL (ref 0–0.7)
EOSINOPHIL NFR BLD AUTO: 2 %
ERYTHROCYTE [DISTWIDTH] IN BLOOD BY AUTOMATED COUNT: 13.8 %
GLOBULIN PLAS-MCNC: 3.7 G/DL (ref 2.8–4.4)
GLUCOSE BLD-MCNC: 124 MG/DL (ref 70–99)
HCT VFR BLD AUTO: 41.4 %
HGB BLD-MCNC: 13.1 G/DL
IMM GRANULOCYTES # BLD AUTO: 0.03 X10(3) UL (ref 0–1)
IMM GRANULOCYTES NFR BLD: 0.6 %
INR BLD: 0.98 (ref 0.8–1.2)
LYMPHOCYTES # BLD AUTO: 0.99 X10(3) UL (ref 1–4)
LYMPHOCYTES NFR BLD AUTO: 18.2 %
MCH RBC QN AUTO: 27.6 PG (ref 26–34)
MCHC RBC AUTO-ENTMCNC: 31.6 G/DL (ref 31–37)
MCV RBC AUTO: 87.3 FL
MONOCYTES # BLD AUTO: 0.43 X10(3) UL (ref 0.1–1)
MONOCYTES NFR BLD AUTO: 7.9 %
NEUTROPHILS # BLD AUTO: 3.82 X10 (3) UL (ref 1.5–7.7)
NEUTROPHILS # BLD AUTO: 3.82 X10(3) UL (ref 1.5–7.7)
NEUTROPHILS NFR BLD AUTO: 70.2 %
NT-PROBNP SERPL-MCNC: 448 PG/ML (ref ?–450)
OSMOLALITY SERPL CALC.SUM OF ELEC: 268 MOSM/KG (ref 275–295)
PLATELET # BLD AUTO: 232 10(3)UL (ref 150–450)
POTASSIUM SERPL-SCNC: 4.5 MMOL/L (ref 3.5–5.1)
PROT SERPL-MCNC: 7.3 G/DL (ref 6.4–8.2)
PROTHROMBIN TIME: 12.8 SECONDS (ref 11.6–14.8)
RBC # BLD AUTO: 4.74 X10(6)UL
SARS-COV-2 RNA RESP QL NAA+PROBE: NOT DETECTED
SODIUM SERPL-SCNC: 128 MMOL/L (ref 136–145)
TROPONIN I HIGH SENSITIVITY: 17 NG/L
TROPONIN I HIGH SENSITIVITY: 24 NG/L
TROPONIN I HIGH SENSITIVITY: 27 NG/L
TROPONIN I HIGH SENSITIVITY: 27 NG/L
WBC # BLD AUTO: 5.4 X10(3) UL (ref 4–11)

## 2022-06-11 PROCEDURE — 99220 INITIAL OBSERVATION CARE,LEVL III: CPT | Performed by: INTERNAL MEDICINE

## 2022-06-11 PROCEDURE — 71045 X-RAY EXAM CHEST 1 VIEW: CPT | Performed by: EMERGENCY MEDICINE

## 2022-06-11 RX ORDER — ALBUTEROL SULFATE 90 UG/1
2 AEROSOL, METERED RESPIRATORY (INHALATION) EVERY 6 HOURS PRN
Status: DISCONTINUED | OUTPATIENT
Start: 2022-06-11 | End: 2022-06-12

## 2022-06-11 RX ORDER — ASPIRIN 81 MG/1
81 TABLET ORAL DAILY
Status: DISCONTINUED | OUTPATIENT
Start: 2022-06-11 | End: 2022-06-11

## 2022-06-11 RX ORDER — LISINOPRIL 10 MG/1
10 TABLET ORAL 2 TIMES DAILY
Status: DISCONTINUED | OUTPATIENT
Start: 2022-06-11 | End: 2022-06-12

## 2022-06-11 RX ORDER — ALPRAZOLAM 0.25 MG/1
0.25 TABLET ORAL 3 TIMES DAILY PRN
Status: DISCONTINUED | OUTPATIENT
Start: 2022-06-11 | End: 2022-06-12

## 2022-06-11 RX ORDER — TEMAZEPAM 15 MG/1
15 CAPSULE ORAL NIGHTLY
Status: DISCONTINUED | OUTPATIENT
Start: 2022-06-11 | End: 2022-06-12

## 2022-06-11 RX ORDER — CARBIDOPA AND LEVODOPA 25; 100 MG/1; MG/1
1 TABLET, EXTENDED RELEASE ORAL DAILY
COMMUNITY

## 2022-06-11 RX ORDER — CARBIDOPA AND LEVODOPA 25; 100 MG/1; MG/1
1 TABLET, EXTENDED RELEASE ORAL DAILY
Status: DISCONTINUED | OUTPATIENT
Start: 2022-06-11 | End: 2022-06-12

## 2022-06-11 RX ORDER — NITROGLYCERIN 0.4 MG/1
0.4 TABLET SUBLINGUAL EVERY 5 MIN PRN
Status: DISCONTINUED | OUTPATIENT
Start: 2022-06-11 | End: 2022-06-12

## 2022-06-11 RX ORDER — AMANTADINE HYDROCHLORIDE 100 MG/1
100 TABLET ORAL 2 TIMES DAILY
Status: DISCONTINUED | OUTPATIENT
Start: 2022-06-11 | End: 2022-06-12

## 2022-06-11 RX ORDER — LISINOPRIL 2.5 MG/1
5 TABLET ORAL 2 TIMES DAILY
Status: DISCONTINUED | OUTPATIENT
Start: 2022-06-11 | End: 2022-06-11

## 2022-06-11 RX ORDER — ASPIRIN 325 MG
325 TABLET ORAL DAILY
Status: DISCONTINUED | OUTPATIENT
Start: 2022-06-11 | End: 2022-06-12

## 2022-06-11 RX ORDER — RASAGILINE 0.5 MG/1
1 TABLET ORAL DAILY
Status: DISCONTINUED | OUTPATIENT
Start: 2022-06-11 | End: 2022-06-12

## 2022-06-11 RX ORDER — CLOPIDOGREL BISULFATE 75 MG/1
75 TABLET ORAL DAILY
Status: DISCONTINUED | OUTPATIENT
Start: 2022-06-11 | End: 2022-06-12

## 2022-06-11 RX ORDER — ALPRAZOLAM 0.25 MG/1
0.25 TABLET ORAL NIGHTLY PRN
Status: DISCONTINUED | OUTPATIENT
Start: 2022-06-11 | End: 2022-06-11

## 2022-06-11 RX ORDER — ENTACAPONE 200 MG/1
100 TABLET ORAL DAILY
Status: DISCONTINUED | OUTPATIENT
Start: 2022-06-12 | End: 2022-06-12

## 2022-06-11 RX ORDER — HYDRALAZINE HYDROCHLORIDE 20 MG/ML
5 INJECTION INTRAMUSCULAR; INTRAVENOUS EVERY 6 HOURS PRN
Status: DISCONTINUED | OUTPATIENT
Start: 2022-06-11 | End: 2022-06-12

## 2022-06-11 RX ORDER — ONDANSETRON 2 MG/ML
4 INJECTION INTRAMUSCULAR; INTRAVENOUS EVERY 6 HOURS PRN
Status: DISCONTINUED | OUTPATIENT
Start: 2022-06-11 | End: 2022-06-12

## 2022-06-11 RX ORDER — ROSUVASTATIN CALCIUM 20 MG/1
20 TABLET, COATED ORAL NIGHTLY
Status: DISCONTINUED | OUTPATIENT
Start: 2022-06-11 | End: 2022-06-12

## 2022-06-11 RX ORDER — AZELASTINE 1 MG/ML
2 SPRAY, METERED NASAL 2 TIMES DAILY
Status: DISCONTINUED | OUTPATIENT
Start: 2022-06-11 | End: 2022-06-12

## 2022-06-11 RX ORDER — FINASTERIDE 5 MG/1
5 TABLET, FILM COATED ORAL DAILY
Status: DISCONTINUED | OUTPATIENT
Start: 2022-06-11 | End: 2022-06-12

## 2022-06-11 NOTE — ED INITIAL ASSESSMENT (HPI)
Pt presents to ED for c/o of \"heavy breathing\" and high BP in 400Z systolic. O2 sats at 97, easy wob.

## 2022-06-11 NOTE — ED QUICK NOTES
Rounding Completed    Plan of Care reviewed. Waiting for labs to come back. Elimination needs assessed. Bed is locked and in lowest position. Call light within reach.

## 2022-06-11 NOTE — PLAN OF CARE
Pt. A&O x4, on RA, NSR on tele. BP elevated, MD notified. Saline locked. No c/o pain. Is c/o of being \"locked up\" d/t missing doses of his Parkinson's meds. MD notified. No current SOB or chest pain. Fall and safety precautions in place. Will continue to monitor.      Problem: RESPIRATORY - ADULT  Goal: Achieves optimal ventilation and oxygenation  Description: INTERVENTIONS:  - Assess for changes in respiratory status  - Assess for changes in mentation and behavior  - Position to facilitate oxygenation and minimize respiratory effort  - Oxygen supplementation based on oxygen saturation or ABGs  - Provide Smoking Cessation handout, if applicable  - Encourage broncho-pulmonary hygiene including cough, deep breathe, Incentive Spirometry  - Assess the need for suctioning and perform as needed  - Assess and instruct to report SOB or any respiratory difficulty  - Respiratory Therapy support as indicated  - Manage/alleviate anxiety  - Monitor for signs/symptoms of CO2 retention  Outcome: Progressing     Problem: SKIN/TISSUE INTEGRITY - ADULT  Goal: Skin integrity remains intact  Description: INTERVENTIONS  - Assess and document risk factors for pressure ulcer development  - Assess and document skin integrity  - Monitor for areas of redness and/or skin breakdown  - Initiate interventions, skin care algorithm/standards of care as needed  Outcome: Progressing     Problem: MUSCULOSKELETAL - ADULT  Goal: Return mobility to safest level of function  Description: INTERVENTIONS:  - Assess patient stability and activity tolerance for standing, transferring and ambulating w/ or w/o assistive devices  - Assist with transfers and ambulation using safe patient handling equipment as needed  - Ensure adequate protection for wounds/incisions during mobilization  - Obtain PT/OT consults as needed  - Advance activity as appropriate  - Communicate ordered activity level and limitations with patient/family  Outcome: Progressing     Problem: Impaired Functional Mobility  Goal: Achieve highest/safest level of mobility/gait  Description: Interventions:  - Assess patient's functional ability and stability  - Promote increasing activity/tolerance for mobility and gait  - Educate and engage patient/family in tolerated activity level and precautions    Outcome: Progressing     Problem: SAFETY ADULT - FALL  Goal: Free from fall injury  Description: INTERVENTIONS:  - Assess pt frequently for physical needs  - Identify cognitive and physical deficits and behaviors that affect risk of falls.   - [place] fall precautions as indicated by assessment.  - Educate pt/family on patient safety including physical limitations  - Instruct pt to call for assistance with activity based on assessment  - Modify environment to reduce risk of injury  - Provide assistive devices as appropriate  - Consider OT/PT consult to assist with strengthening/mobility  - Encourage toileting schedule  Outcome: Progressing

## 2022-06-11 NOTE — ED INITIAL ASSESSMENT (HPI)
Pt states that he started with chest pain and hypertension since 1900 today. Family states that he had \"heavy breathing earlier today. \"

## 2022-06-11 NOTE — PLAN OF CARE
[facility] care 0730. Alert and oriented x4. PIV saline locked. Cardiac diet. RA. Bruising to left arm. Contient. Electrolyte protocol- cardiac replacement  PT/OT to see pt. Continue to monitor pt.        Problem: RESPIRATORY - ADULT  Goal: Achieves optimal ventilation and oxygenation  Description: INTERVENTIONS:  - Assess for changes in respiratory status  - Assess for changes in mentation and behavior  - Position to facilitate oxygenation and minimize respiratory effort  - Oxygen supplementation based on oxygen saturation or ABGs  - Provide Smoking Cessation handout, if applicable  - Encourage broncho-pulmonary hygiene including cough, deep breathe, Incentive Spirometry  - Assess the need for suctioning and perform as needed  - Assess and instruct to report SOB or any respiratory difficulty  - Respiratory Therapy support as indicated  - Manage/alleviate anxiety  - Monitor for signs/symptoms of CO2 retention  Outcome: Progressing     Problem: MUSCULOSKELETAL - ADULT  Goal: Return mobility to safest level of function  Description: INTERVENTIONS:  - Assess patient stability and activity tolerance for standing, transferring and ambulating w/ or w/o assistive devices  - Assist with transfers and ambulation using safe patient handling equipment as needed  - Ensure adequate protection for wounds/incisions during mobilization  - Obtain PT/OT consults as needed  - Advance activity as appropriate  - Communicate ordered activity level and limitations with patient/family  Outcome: Progressing     Problem: Impaired Functional Mobility  Goal: Achieve highest/safest level of mobility/gait  Description: Interventions:  - Assess patient's functional ability and stability  - Promote increasing activity/tolerance for mobility and gait  - Educate and engage patient/family in tolerated activity level and precautions    Outcome: Progressing     Problem: SAFETY ADULT - FALL  Goal: Free from fall injury  Description: INTERVENTIONS:  - Assess pt frequently for physical needs  - Identify cognitive and physical deficits and behaviors that affect risk of falls.   - [place] fall precautions as indicated by assessment.  - Educate pt/family on patient safety including physical limitations  - Instruct pt to call for assistance with activity based on assessment  - Modify environment to reduce risk of injury  - Provide assistive devices as appropriate  - Consider OT/PT consult to assist with strengthening/mobility  - Encourage toileting schedule  Outcome: Progressing

## 2022-06-12 ENCOUNTER — APPOINTMENT (OUTPATIENT)
Dept: CV DIAGNOSTICS | Facility: HOSPITAL | Age: 82
End: 2022-06-12
Attending: INTERNAL MEDICINE
Payer: MEDICARE

## 2022-06-12 VITALS
DIASTOLIC BLOOD PRESSURE: 72 MMHG | WEIGHT: 135.13 LBS | TEMPERATURE: 98 F | SYSTOLIC BLOOD PRESSURE: 168 MMHG | OXYGEN SATURATION: 99 % | BODY MASS INDEX: 20 KG/M2 | RESPIRATION RATE: 18 BRPM | HEART RATE: 60 BPM

## 2022-06-12 LAB
ANION GAP SERPL CALC-SCNC: 7 MMOL/L (ref 0–18)
ATRIAL RATE: 61 BPM
ATRIAL RATE: 66 BPM
BASOPHILS # BLD AUTO: 0.05 X10(3) UL (ref 0–0.2)
BASOPHILS NFR BLD AUTO: 1.1 %
BUN BLD-MCNC: 11 MG/DL (ref 7–18)
CALCIUM BLD-MCNC: 9.4 MG/DL (ref 8.5–10.1)
CHLORIDE SERPL-SCNC: 96 MMOL/L (ref 98–112)
CHOLEST SERPL-MCNC: 133 MG/DL (ref ?–200)
CO2 SERPL-SCNC: 26 MMOL/L (ref 21–32)
CREAT BLD-MCNC: 0.81 MG/DL
EOSINOPHIL # BLD AUTO: 0.16 X10(3) UL (ref 0–0.7)
EOSINOPHIL NFR BLD AUTO: 3.4 %
ERYTHROCYTE [DISTWIDTH] IN BLOOD BY AUTOMATED COUNT: 13.2 %
GLUCOSE BLD-MCNC: 89 MG/DL (ref 70–99)
HCT VFR BLD AUTO: 40.1 %
HDLC SERPL-MCNC: 91 MG/DL (ref 40–59)
HGB BLD-MCNC: 13.4 G/DL
IMM GRANULOCYTES # BLD AUTO: 0.01 X10(3) UL (ref 0–1)
IMM GRANULOCYTES NFR BLD: 0.2 %
LDLC SERPL CALC-MCNC: 29 MG/DL (ref ?–100)
LYMPHOCYTES # BLD AUTO: 1.3 X10(3) UL (ref 1–4)
LYMPHOCYTES NFR BLD AUTO: 27.7 %
MCH RBC QN AUTO: 28.4 PG (ref 26–34)
MCHC RBC AUTO-ENTMCNC: 33.4 G/DL (ref 31–37)
MCV RBC AUTO: 85 FL
MONOCYTES # BLD AUTO: 0.57 X10(3) UL (ref 0.1–1)
MONOCYTES NFR BLD AUTO: 12.2 %
NEUTROPHILS # BLD AUTO: 2.6 X10 (3) UL (ref 1.5–7.7)
NEUTROPHILS # BLD AUTO: 2.6 X10(3) UL (ref 1.5–7.7)
NEUTROPHILS NFR BLD AUTO: 55.4 %
NONHDLC SERPL-MCNC: 42 MG/DL (ref ?–130)
OSMOLALITY SERPL CALC.SUM OF ELEC: 267 MOSM/KG (ref 275–295)
P AXIS: 64 DEGREES
P AXIS: 70 DEGREES
P-R INTERVAL: 212 MS
P-R INTERVAL: 218 MS
PLATELET # BLD AUTO: 231 10(3)UL (ref 150–450)
POTASSIUM SERPL-SCNC: 4.3 MMOL/L (ref 3.5–5.1)
Q-T INTERVAL: 392 MS
Q-T INTERVAL: 402 MS
QRS DURATION: 70 MS
QRS DURATION: 80 MS
QTC CALCULATION (BEZET): 404 MS
QTC CALCULATION (BEZET): 410 MS
R AXIS: 60 DEGREES
R AXIS: 88 DEGREES
RBC # BLD AUTO: 4.72 X10(6)UL
SODIUM SERPL-SCNC: 129 MMOL/L (ref 136–145)
T AXIS: 61 DEGREES
T AXIS: 80 DEGREES
TRIGL SERPL-MCNC: 62 MG/DL (ref 30–149)
VENTRICULAR RATE: 61 BPM
VENTRICULAR RATE: 66 BPM
VLDLC SERPL CALC-MCNC: 8 MG/DL (ref 0–30)
WBC # BLD AUTO: 4.7 X10(3) UL (ref 4–11)

## 2022-06-12 PROCEDURE — 99217 OBSERVATION CARE DISCHARGE: CPT | Performed by: HOSPITALIST

## 2022-06-12 PROCEDURE — 93306 TTE W/DOPPLER COMPLETE: CPT | Performed by: INTERNAL MEDICINE

## 2022-06-12 RX ORDER — LISINOPRIL 10 MG/1
10 TABLET ORAL 2 TIMES DAILY
Qty: 60 TABLET | Refills: 1 | Status: SHIPPED | OUTPATIENT
Start: 2022-06-12

## 2022-06-12 NOTE — PLAN OF CARE
Problem: RESPIRATORY - ADULT  Goal: Achieves optimal ventilation and oxygenation  Description: INTERVENTIONS:  - Assess for changes in respiratory status  - Assess for changes in mentation and behavior  - Position to facilitate oxygenation and minimize respiratory effort  - Oxygen supplementation based on oxygen saturation or ABGs  - Provide Smoking Cessation handout, if applicable  - Encourage broncho-pulmonary hygiene including cough, deep breathe, Incentive Spirometry  - Assess the need for suctioning and perform as needed  - Assess and instruct to report SOB or any respiratory difficulty  - Respiratory Therapy support as indicated  - Manage/alleviate anxiety  - Monitor for signs/symptoms of CO2 retention  Outcome: Progressing     Problem: SKIN/TISSUE INTEGRITY - ADULT  Goal: Skin integrity remains intact  Description: INTERVENTIONS  - Assess and document risk factors for pressure ulcer development  - Assess and document skin integrity  - Monitor for areas of redness and/or skin breakdown  - Initiate interventions, skin care algorithm/standards of care as needed  Outcome: Progressing     Problem: MUSCULOSKELETAL - ADULT  Goal: Return mobility to safest level of function  Description: INTERVENTIONS:  - Assess patient stability and activity tolerance for standing, transferring and ambulating w/ or w/o assistive devices  - Assist with transfers and ambulation using safe patient handling equipment as needed  - Ensure adequate protection for wounds/incisions during mobilization  - Obtain PT/OT consults as needed  - Advance activity as appropriate  - Communicate ordered activity level and limitations with patient/family  Outcome: Progressing     Problem: SAFETY ADULT - FALL  Goal: Free from fall injury  Description: INTERVENTIONS:  - Assess pt frequently for physical needs  - Identify cognitive and physical deficits and behaviors that affect risk of falls. - [place] fall precautions as indicated by assessment.   - Educate pt/family on patient safety including physical limitations  - Instruct pt to call for assistance with activity based on assessment  - Modify environment to reduce risk of injury  - Provide assistive devices as appropriate  - Consider OT/PT consult to assist with strengthening/mobility  - Encourage toileting schedule  Outcome: Progressing

## 2022-06-12 NOTE — PLAN OF CARE
Pt. A&O x4, on RA, NSR on tele. VSS. Up with x1/walker. Went on multiple walks in the hallway with staff. Saline locked. No c/o pain. Bed and chair alarm in place d/t high fall risk, pt. Recently fell a few days ago at home, dose have diffuse bruising because of this. Family present when rounding, updated on plan of care. No c/o SOB or chest pain. Fall and safety precautions in place. Will continue to monitor.      Problem: RESPIRATORY - ADULT  Goal: Achieves optimal ventilation and oxygenation  Description: INTERVENTIONS:  - Assess for changes in respiratory status  - Assess for changes in mentation and behavior  - Position to facilitate oxygenation and minimize respiratory effort  - Oxygen supplementation based on oxygen saturation or ABGs  - Provide Smoking Cessation handout, if applicable  - Encourage broncho-pulmonary hygiene including cough, deep breathe, Incentive Spirometry  - Assess the need for suctioning and perform as needed  - Assess and instruct to report SOB or any respiratory difficulty  - Respiratory Therapy support as indicated  - Manage/alleviate anxiety  - Monitor for signs/symptoms of CO2 retention  Outcome: Progressing     Problem: SKIN/TISSUE INTEGRITY - ADULT  Goal: Skin integrity remains intact  Description: INTERVENTIONS  - Assess and document risk factors for pressure ulcer development  - Assess and document skin integrity  - Monitor for areas of redness and/or skin breakdown  - Initiate interventions, skin care algorithm/standards of care as needed  Outcome: Progressing     Problem: MUSCULOSKELETAL - ADULT  Goal: Return mobility to safest level of function  Description: INTERVENTIONS:  - Assess patient stability and activity tolerance for standing, transferring and ambulating w/ or w/o assistive devices  - Assist with transfers and ambulation using safe patient handling equipment as needed  - Ensure adequate protection for wounds/incisions during mobilization  - Obtain PT/OT consults as needed  - Advance activity as appropriate  - Communicate ordered activity level and limitations with patient/family  Outcome: Progressing     Problem: Impaired Functional Mobility  Goal: Achieve highest/safest level of mobility/gait  Description: Interventions:  - Assess patient's functional ability and stability  - Promote increasing activity/tolerance for mobility and gait  - Educate and engage patient/family in tolerated activity level and precautions    Outcome: Progressing     Problem: SAFETY ADULT - FALL  Goal: Free from fall injury  Description: INTERVENTIONS:  - Assess pt frequently for physical needs  - Identify cognitive and physical deficits and behaviors that affect risk of falls.   - [place] fall precautions as indicated by assessment.  - Educate pt/family on patient safety including physical limitations  - Instruct pt to call for assistance with activity based on assessment  - Modify environment to reduce risk of injury  - Provide assistive devices as appropriate  - Consider OT/PT consult to assist with strengthening/mobility  - Encourage toileting schedule  Outcome: Progressing

## 2022-06-12 NOTE — PLAN OF CARE
Spoke with cardiology while on floor to see patient. Reviewed BP, meds and test results. Per cardiology, patient may discharge.

## 2022-06-12 NOTE — PHYSICAL THERAPY NOTE
PHYSICAL THERAPY EVALUATION - INPATIENT     Room Number: 8691/0742-G  Evaluation Date: 6/12/2022  Type of Evaluation: Initial  Physician Order: PT Eval and Treat    Presenting Problem: SOB  Co-Morbidities : PD, COPD, PVD, CAD, HTN  Reason for Therapy: Mobility Dysfunction and Discharge Planning      ASSESSMENT   Pt is a 80year old male admitted on 6/10/2022 for SOB and elevated BP. Functional outcome measures completed include AMPAC and short form DGI. The AM-PAC '6-Clicks' Inpatient Basic Mobility Short Form was completed and this patient is demonstrating a Approx Degree of Impairment: 20.91%  degree of impairment in mobility. Short form DGI indicative of fall risk without use of RW, able to improve with use of RW. Research supports that patients with this level of impairment may benefit from return home with OP PT to progress balance and gait, decrease fall risk. Based on this evaluation, patient's clinical presentation is stable and overall the evaluation complexity is considered low. PT Discharge Recommendations: Home; Intermittent Supervision; Outpatient PT      PLAN  Patient has been evaluated and presents with no skilled Physical Therapy needs at this time. Patient discharged from Physical Therapy services. Please re-order if a new functional limitation presents during this admission. GOALS  Patient was able to achieve the following goals . ..     Patient was able to transfer Safely and independently   Patient able to ambulate on level surfaces Safely and independently  supervision for longer distances         HOME SITUATION  Type of Home: House   Home Layout: Bed/bath upstairs  Stairs to Enter : 1     Stairs to [employer]: 13  Railing: Yes    Lives With: Spouse     Patient Owned Equipment: Rolling walker       Prior Level of [place]: indep without device, recent fall this week when turning, able to stabilize with use of furniture, pt's wife states that pt with increased tremors and balance when Face Mask medication wearing off (~3-4 hours)    SUBJECTIVE  Reports no deficits, able to walk      OBJECTIVE     Fall Risk: High fall risk    WEIGHT BEARING RESTRICTION  Weight Bearing Restriction: None                PAIN ASSESSMENT  Ratin          COGNITION  A&Ox3, follows commands, decreased insight into deficits, altered safety awareness    RANGE OF MOTION AND STRENGTH ASSESSMENT  Upper extremity ROM and strength are within functional limits     Lower extremity ROM is within functional limits except B knee extension limited by ~ 10deg    Lower extremity strength is within functional limits       BALANCE  Static Sitting: Good  Dynamic Sitting: Good  Static Standing: Fair  Dynamic Standing: Fair -    ADDITIONAL TESTS                                    ACTIVITY TOLERANCE           BP: (!) 168/72        Patient Position: Sitting    O2 WALK       NEUROLOGICAL FINDINGS   SILT BUE/BLE  Truncal tremor with dynamic movements                     AM-PAC '6-Clicks' INPATIENT SHORT FORM - BASIC MOBILITY  How much difficulty does the patient currently have. .. Patient Difficulty: Turning over in bed (including adjusting bedclothes, sheets and blankets)?: None   Patient Difficulty: Sitting down on and standing up from a chair with arms (e.g., wheelchair, bedside commode, etc.): None   Patient Difficulty: Moving from lying on back to sitting on the side of the bed?: None   How much help from another person does the patient currently need. ..    Help from Another: Moving to and from a bed to a chair (including a wheelchair)?: None   Help from Another: Need to walk in hospital room?: A Little   Help from Another: Climbing 3-5 steps with a railing?: A Little       AM-PAC Score:  Raw Score: 22   Approx Degree of Impairment: 20.91%   Standardized Score (AM-PAC Scale): 53.28   CMS Modifier (G-Code): CJ    FUNCTIONAL ABILITY STATUS  Gait Assessment   Functional Mobility/Gait Assessment  Gait Assistance: Contact guard assist  Distance (ft): 200, 150  Assistive Device: Rolling walker    Skilled Therapy Provided       Transfer Mobility:  Sit to stand: mod i   Stand to sit: mod I  Gait = 150ft without device min A, shuffled gait, decreased gait speed, forward head  Short form DGI: 8/12 without device  Gait: 200ft with RW supervision, increased speed, increased stride length, able to turn head R/L up/down, 1 LOB, able to self correct, able to navigate obstacles and turns  Stairs x 1 flight with single railing, reciprocal upon ascent, step to upon descent supervision    Education: Role of PT, dc recs, use of RW for safety, pt/pt's wife verbalized understanding      Patient End of Session: Up in chair;Needs met;Call light within reach;RN aware of session/findings; All patient questions and concerns addressed; Alarm set; Family present

## 2022-06-14 ENCOUNTER — TELEPHONE (OUTPATIENT)
Dept: PHYSICAL THERAPY | Facility: HOSPITAL | Age: 82
End: 2022-06-14

## 2022-06-14 NOTE — TELEPHONE ENCOUNTER
Patient left . Patient explained just got out of hospital, has an order for PT, and was told to get started with PT as soon as possible. Requested call back from me to schedule. I sent message with this request to [facility] as I am out of office this week.

## 2022-06-15 ENCOUNTER — TELEPHONE (OUTPATIENT)
Dept: PHYSICAL THERAPY | Facility: HOSPITAL | Age: 82
End: 2022-06-15

## 2022-06-15 ENCOUNTER — ORDER TRANSCRIPTION (OUTPATIENT)
Dept: PHYSICAL THERAPY | Facility: HOSPITAL | Age: 82
End: 2022-06-15

## 2022-06-15 DIAGNOSIS — G20 PARKINSON'S DISEASE (HCC): Primary | ICD-10-CM

## 2022-06-21 ENCOUNTER — TELEPHONE (OUTPATIENT)
Dept: PHYSICAL THERAPY | Facility: HOSPITAL | Age: 82
End: 2022-06-21

## 2022-06-24 ENCOUNTER — OFFICE VISIT (OUTPATIENT)
Dept: PHYSICAL THERAPY | Facility: HOSPITAL | Age: 82
End: 2022-06-24
Attending: HOSPITALIST
Payer: MEDICARE

## 2022-06-24 DIAGNOSIS — G20 PARKINSON'S DISEASE (HCC): ICD-10-CM

## 2022-06-24 PROCEDURE — 97163 PT EVAL HIGH COMPLEX 45 MIN: CPT

## 2022-06-28 ENCOUNTER — APPOINTMENT (OUTPATIENT)
Dept: PHYSICAL THERAPY | Facility: HOSPITAL | Age: 82
End: 2022-06-28
Attending: HOSPITALIST
Payer: MEDICARE

## 2022-06-28 ENCOUNTER — TELEPHONE (OUTPATIENT)
Dept: PHYSICAL THERAPY | Age: 82
End: 2022-06-28

## 2022-07-20 ENCOUNTER — TELEPHONE (OUTPATIENT)
Dept: HEMATOLOGY/ONCOLOGY | Age: 82
End: 2022-07-20

## 2022-08-08 ENCOUNTER — APPOINTMENT (OUTPATIENT)
Dept: CT IMAGING | Age: 82
End: 2022-08-08
Attending: EMERGENCY MEDICINE
Payer: MEDICARE

## 2022-08-08 ENCOUNTER — APPOINTMENT (OUTPATIENT)
Dept: GENERAL RADIOLOGY | Age: 82
End: 2022-08-08
Attending: EMERGENCY MEDICINE
Payer: MEDICARE

## 2022-08-08 ENCOUNTER — HOSPITAL ENCOUNTER (EMERGENCY)
Age: 82
Discharge: HOME | End: 2022-08-08
Attending: EMERGENCY MEDICINE
Payer: MEDICARE

## 2022-08-08 VITALS
HEIGHT: 69 IN | OXYGEN SATURATION: 97 % | BODY MASS INDEX: 18.96 KG/M2 | SYSTOLIC BLOOD PRESSURE: 185 MMHG | RESPIRATION RATE: 16 BRPM | TEMPERATURE: 97 F | HEART RATE: 76 BPM | WEIGHT: 128 LBS | DIASTOLIC BLOOD PRESSURE: 82 MMHG

## 2022-08-08 DIAGNOSIS — S20.211A RIB CONTUSION, RIGHT, INITIAL ENCOUNTER: Primary | ICD-10-CM

## 2022-08-08 DIAGNOSIS — S09.8XXA BLUNT HEAD TRAUMA, INITIAL ENCOUNTER: ICD-10-CM

## 2022-08-08 LAB
ALBUMIN SERPL-MCNC: 3.6 G/DL (ref 3.4–5)
ALBUMIN/GLOB SERPL: 1.2 {RATIO} (ref 1–2)
ALP LIVER SERPL-CCNC: 56 U/L
ALT SERPL-CCNC: 8 U/L
ANION GAP SERPL CALC-SCNC: 6 MMOL/L (ref 0–18)
AST SERPL-CCNC: 25 U/L (ref 15–37)
BASOPHILS # BLD AUTO: 0.05 X10(3) UL (ref 0–0.2)
BASOPHILS NFR BLD AUTO: 0.5 %
BILIRUB SERPL-MCNC: 0.8 MG/DL (ref 0.1–2)
BUN BLD-MCNC: 15 MG/DL (ref 7–18)
CALCIUM BLD-MCNC: 9.7 MG/DL (ref 8.5–10.1)
CHLORIDE SERPL-SCNC: 94 MMOL/L (ref 98–112)
CO2 SERPL-SCNC: 27 MMOL/L (ref 21–32)
CREAT BLD-MCNC: 1.07 MG/DL
EOSINOPHIL # BLD AUTO: 0.04 X10(3) UL (ref 0–0.7)
EOSINOPHIL NFR BLD AUTO: 0.4 %
ERYTHROCYTE [DISTWIDTH] IN BLOOD BY AUTOMATED COUNT: 13.7 %
GFR SERPLBLD BASED ON 1.73 SQ M-ARVRAT: 70 ML/MIN/1.73M2 (ref 60–?)
GLOBULIN PLAS-MCNC: 3 G/DL (ref 2.8–4.4)
GLUCOSE BLD-MCNC: 104 MG/DL (ref 70–99)
HCT VFR BLD AUTO: 37.2 %
HGB BLD-MCNC: 12.4 G/DL
IMM GRANULOCYTES # BLD AUTO: 0.04 X10(3) UL (ref 0–1)
IMM GRANULOCYTES NFR BLD: 0.4 %
LYMPHOCYTES # BLD AUTO: 0.92 X10(3) UL (ref 1–4)
LYMPHOCYTES NFR BLD AUTO: 9.9 %
MCH RBC QN AUTO: 27.9 PG (ref 26–34)
MCHC RBC AUTO-ENTMCNC: 33.3 G/DL (ref 31–37)
MCV RBC AUTO: 83.6 FL
MONOCYTES # BLD AUTO: 0.81 X10(3) UL (ref 0.1–1)
MONOCYTES NFR BLD AUTO: 8.7 %
NEUTROPHILS # BLD AUTO: 7.48 X10 (3) UL (ref 1.5–7.7)
NEUTROPHILS # BLD AUTO: 7.48 X10(3) UL (ref 1.5–7.7)
NEUTROPHILS NFR BLD AUTO: 80.1 %
OSMOLALITY SERPL CALC.SUM OF ELEC: 265 MOSM/KG (ref 275–295)
PLATELET # BLD AUTO: 242 10(3)UL (ref 150–450)
POTASSIUM SERPL-SCNC: 4.4 MMOL/L (ref 3.5–5.1)
PROT SERPL-MCNC: 6.6 G/DL (ref 6.4–8.2)
RBC # BLD AUTO: 4.45 X10(6)UL
SODIUM SERPL-SCNC: 127 MMOL/L (ref 136–145)
WBC # BLD AUTO: 9.3 X10(3) UL (ref 4–11)

## 2022-08-08 PROCEDURE — 85025 COMPLETE CBC W/AUTO DIFF WBC: CPT | Performed by: EMERGENCY MEDICINE

## 2022-08-08 PROCEDURE — 80053 COMPREHEN METABOLIC PANEL: CPT | Performed by: EMERGENCY MEDICINE

## 2022-08-08 PROCEDURE — 70450 CT HEAD/BRAIN W/O DYE: CPT | Performed by: EMERGENCY MEDICINE

## 2022-08-08 PROCEDURE — 99284 EMERGENCY DEPT VISIT MOD MDM: CPT

## 2022-08-08 PROCEDURE — 36415 COLL VENOUS BLD VENIPUNCTURE: CPT

## 2022-08-08 PROCEDURE — 71101 X-RAY EXAM UNILAT RIBS/CHEST: CPT | Performed by: EMERGENCY MEDICINE

## 2022-08-08 NOTE — ED INITIAL ASSESSMENT (HPI)
After getting out of bed this am he grabbed onto the drapes and it gave way and he fell to the floor about 0500. Pt hit head, right shoulder and back. 911 was called to help him up.
Spine appears normal, no midline tenderness, right paraspinal ttp. negative straight leg raise, strength 5/5 in bilateral LE, sensation equal and intact to light touch

## 2022-08-10 ENCOUNTER — TELEPHONE (OUTPATIENT)
Dept: PHYSICAL THERAPY | Facility: HOSPITAL | Age: 82
End: 2022-08-10

## 2022-08-10 NOTE — ED PROVIDER NOTES
Patient Seen in: [facility] Emergency Department      History   No chief complaint on file.     Stated Complaint:     HPI/Subjective:   HPI    42-year-old male with past medical history of Parkinson's disease, hyperlipidemia presents today for chest Vitals   BP    Pulse    Resp    Temp    Temp src    SpO2    O2 Device        Current:There were no vitals taken for this visit. Physical Exam  Vitals and nursing note reviewed. Constitutional:       Appearance: Normal appearance.    HENT:      Head complete resolution of patient's pain. Patient's D-dimer was negative. His troponin was mildly elevated. Repeat EKG revealed resolution of the ST depression. Remaining laboratory work-up was reassuring.   On my multiple reassessments, patient remained p General

## 2022-08-10 NOTE — TELEPHONE ENCOUNTER
Returned wife's phone call, first explained to [facility] and then wife regarding concerns about PT. Wife reports wants patient to be doing LSVT. Reports Dr. Aditi Castro at [facility] wants him to be doing LSVT. Reports patient was getting LSVT in [place] 1x per week for 2-3 weeks and wants to continue. Explained program for LSVT is intensive and 4x per week for 4 weeks for 1 hour. Explained I do not have openings for LSVT program until mid-October. Also detailed that at the time of evaluation 7 weeks ago on June 24, PT evaluation for gait/neuro was performed after hospitalization and patient very deconditioned and would likely be difficult for patient to tolerate full program at this time in standing. Discussed sometimes patient need visits prior to LSVT in order to improve tolerance to get maximal benefit, but regardles do not have openings to transition until October. Explained I am LSVT certified and will certainly use LSVT principles in [facility] rehabilitation. [facility] is currently scheduled to resume therapy for 2x per week 45 min sessions starting next week.

## 2022-08-17 ENCOUNTER — OFFICE VISIT (OUTPATIENT)
Dept: PHYSICAL THERAPY | Facility: HOSPITAL | Age: 82
End: 2022-08-17
Attending: HOSPITALIST
Payer: MEDICARE

## 2022-08-17 PROCEDURE — 97112 NEUROMUSCULAR REEDUCATION: CPT

## 2022-08-17 PROCEDURE — 97110 THERAPEUTIC EXERCISES: CPT

## 2022-08-22 ENCOUNTER — TELEPHONE (OUTPATIENT)
Dept: PHYSICAL THERAPY | Facility: HOSPITAL | Age: 82
End: 2022-08-22

## 2022-08-22 ENCOUNTER — OFFICE VISIT (OUTPATIENT)
Dept: PHYSICAL THERAPY | Facility: HOSPITAL | Age: 82
End: 2022-08-22
Attending: HOSPITALIST
Payer: MEDICARE

## 2022-08-22 PROCEDURE — 97110 THERAPEUTIC EXERCISES: CPT

## 2022-08-22 PROCEDURE — 97112 NEUROMUSCULAR REEDUCATION: CPT

## 2022-08-24 ENCOUNTER — APPOINTMENT (OUTPATIENT)
Dept: PHYSICAL THERAPY | Facility: HOSPITAL | Age: 82
End: 2022-08-24
Attending: HOSPITALIST
Payer: MEDICARE

## 2022-08-26 ENCOUNTER — TELEPHONE (OUTPATIENT)
Dept: PHYSICAL THERAPY | Facility: HOSPITAL | Age: 82
End: 2022-08-26

## 2022-08-26 ENCOUNTER — OFFICE VISIT (OUTPATIENT)
Dept: PHYSICAL THERAPY | Facility: HOSPITAL | Age: 82
End: 2022-08-26
Attending: HOSPITALIST
Payer: MEDICARE

## 2022-08-26 PROCEDURE — 97110 THERAPEUTIC EXERCISES: CPT

## 2022-08-26 PROCEDURE — 97112 NEUROMUSCULAR REEDUCATION: CPT

## 2022-08-26 NOTE — TELEPHONE ENCOUNTER
Left message with  staff regarding PT recommendation for U-step walker for patient. Patient is now interested in [address] [place] [address].

## 2022-08-29 ENCOUNTER — APPOINTMENT (OUTPATIENT)
Dept: PHYSICAL THERAPY | Facility: HOSPITAL | Age: 82
End: 2022-08-29
Attending: HOSPITALIST
Payer: MEDICARE

## 2022-08-29 ENCOUNTER — TELEPHONE (OUTPATIENT)
Dept: PHYSICAL THERAPY | Facility: HOSPITAL | Age: 82
End: 2022-08-29

## 2022-08-29 NOTE — TELEPHONE ENCOUNTER
Patient NS. Left reminder for next appointment, instructed to call  and number left if needs to cancel/reschedule. Also reminded patient's last scheduled appointment is 9/7. If still wants to continue therapy, should call  to schedule out at earliest convenience.

## 2022-08-31 ENCOUNTER — OFFICE VISIT (OUTPATIENT)
Dept: PHYSICAL THERAPY | Facility: HOSPITAL | Age: 82
End: 2022-08-31
Attending: HOSPITALIST
Payer: MEDICARE

## 2022-08-31 PROCEDURE — 97112 NEUROMUSCULAR REEDUCATION: CPT

## 2022-08-31 PROCEDURE — 97110 THERAPEUTIC EXERCISES: CPT

## 2022-09-07 ENCOUNTER — OFFICE VISIT (OUTPATIENT)
Dept: PHYSICAL THERAPY | Facility: HOSPITAL | Age: 82
End: 2022-09-07
Attending: HOSPITALIST
Payer: MEDICARE

## 2022-09-07 PROCEDURE — 97110 THERAPEUTIC EXERCISES: CPT

## 2022-09-07 PROCEDURE — 97112 NEUROMUSCULAR REEDUCATION: CPT

## 2022-09-09 ENCOUNTER — OFFICE VISIT (OUTPATIENT)
Dept: PHYSICAL THERAPY | Facility: HOSPITAL | Age: 82
End: 2022-09-09
Attending: HOSPITALIST
Payer: MEDICARE

## 2022-09-09 PROCEDURE — 97112 NEUROMUSCULAR REEDUCATION: CPT

## 2022-09-09 PROCEDURE — 97110 THERAPEUTIC EXERCISES: CPT

## 2022-09-12 ENCOUNTER — ORDER TRANSCRIPTION (OUTPATIENT)
Dept: PHYSICAL THERAPY | Facility: HOSPITAL | Age: 82
End: 2022-09-12

## 2022-09-12 DIAGNOSIS — R26.0 STAGGERING GAIT: Primary | ICD-10-CM

## 2022-09-13 ENCOUNTER — TELEPHONE (OUTPATIENT)
Dept: PHYSICAL THERAPY | Facility: HOSPITAL | Age: 82
End: 2022-09-13

## 2022-09-20 ENCOUNTER — OFFICE VISIT (OUTPATIENT)
Dept: PHYSICAL THERAPY | Facility: HOSPITAL | Age: 82
End: 2022-09-20
Attending: HOSPITALIST
Payer: MEDICARE

## 2022-09-20 PROCEDURE — 97112 NEUROMUSCULAR REEDUCATION: CPT

## 2022-09-20 PROCEDURE — 97110 THERAPEUTIC EXERCISES: CPT

## 2022-09-22 ENCOUNTER — APPOINTMENT (OUTPATIENT)
Dept: PHYSICAL THERAPY | Facility: HOSPITAL | Age: 82
End: 2022-09-22
Attending: HOSPITALIST
Payer: MEDICARE

## 2022-09-27 ENCOUNTER — APPOINTMENT (OUTPATIENT)
Dept: PHYSICAL THERAPY | Facility: HOSPITAL | Age: 82
End: 2022-09-27
Attending: HOSPITALIST
Payer: MEDICARE

## 2022-09-27 ENCOUNTER — TELEPHONE (OUTPATIENT)
Dept: PHYSICAL THERAPY | Facility: HOSPITAL | Age: 82
End: 2022-09-27

## 2022-09-29 ENCOUNTER — TELEPHONE (OUTPATIENT)
Dept: PHYSICAL THERAPY | Facility: HOSPITAL | Age: 82
End: 2022-09-29

## 2022-09-29 ENCOUNTER — HOSPITAL ENCOUNTER (OUTPATIENT)
Dept: INTERVENTIONAL RADIOLOGY/VASCULAR | Facility: HOSPITAL | Age: 82
Discharge: HOME | End: 2022-09-29
Attending: INTERNAL MEDICINE | Admitting: INTERNAL MEDICINE
Payer: MEDICARE

## 2022-09-29 ENCOUNTER — APPOINTMENT (OUTPATIENT)
Dept: PHYSICAL THERAPY | Facility: HOSPITAL | Age: 82
End: 2022-09-29
Attending: HOSPITALIST
Payer: MEDICARE

## 2022-09-29 VITALS
OXYGEN SATURATION: 100 % | BODY MASS INDEX: 18.37 KG/M2 | TEMPERATURE: 98 F | DIASTOLIC BLOOD PRESSURE: 56 MMHG | RESPIRATION RATE: 19 BRPM | SYSTOLIC BLOOD PRESSURE: 108 MMHG | HEART RATE: 55 BPM | HEIGHT: 69 IN | WEIGHT: 124 LBS

## 2022-09-29 DIAGNOSIS — R94.39 ABNORMAL STRESS TEST: ICD-10-CM

## 2022-09-29 PROCEDURE — 99152 MOD SED SAME PHYS/QHP 5/>YRS: CPT | Performed by: INTERNAL MEDICINE

## 2022-09-29 PROCEDURE — 4A023N7 MEASUREMENT OF CARDIAC SAMPLING AND PRESSURE, LEFT HEART, PERCUTANEOUS APPROACH: ICD-10-PCS | Performed by: INTERNAL MEDICINE

## 2022-09-29 PROCEDURE — B2111ZZ FLUOROSCOPY OF MULTIPLE CORONARY ARTERIES USING LOW OSMOLAR CONTRAST: ICD-10-PCS | Performed by: INTERNAL MEDICINE

## 2022-09-29 PROCEDURE — B2151ZZ FLUOROSCOPY OF LEFT HEART USING LOW OSMOLAR CONTRAST: ICD-10-PCS | Performed by: INTERNAL MEDICINE

## 2022-09-29 PROCEDURE — 93458 L HRT ARTERY/VENTRICLE ANGIO: CPT | Performed by: INTERNAL MEDICINE

## 2022-09-29 RX ORDER — MIDAZOLAM HYDROCHLORIDE 1 MG/ML
INJECTION INTRAMUSCULAR; INTRAVENOUS
Status: COMPLETED
Start: 2022-09-29 | End: 2022-09-29

## 2022-09-29 RX ORDER — SODIUM CHLORIDE 9 MG/ML
INJECTION, SOLUTION INTRAVENOUS
Status: COMPLETED | OUTPATIENT
Start: 2022-09-30 | End: 2022-09-29

## 2022-09-29 RX ORDER — LIDOCAINE HYDROCHLORIDE 10 MG/ML
INJECTION, SOLUTION EPIDURAL; INFILTRATION; INTRACAUDAL; PERINEURAL
Status: COMPLETED
Start: 2022-09-29 | End: 2022-09-29

## 2022-09-29 RX ORDER — HEPARIN SODIUM 5000 [USP'U]/ML
INJECTION, SOLUTION INTRAVENOUS; SUBCUTANEOUS
Status: COMPLETED
Start: 2022-09-29 | End: 2022-09-29

## 2022-09-29 RX ADMIN — SODIUM CHLORIDE: 9 INJECTION, SOLUTION INTRAVENOUS at 07:31:00

## 2022-09-29 NOTE — TELEPHONE ENCOUNTER
Patient NS. Noted that saw per chart patient had another appointment this AM and would not be appropriate for therapy. Informed of cancellation policy- more than 24 hours in advance. Patient has had multiple late cancels/NS over duration of care. Requested call back to my desk to confirm next appointment/discuss continuation of therapy.

## 2022-09-29 NOTE — PROCEDURES
[address] Location: Cath Lab    CSN [id number] MRN [id number]   Admission Date 9/29/2022 Procedure Date 9/29/2022   Attending Physician Lucy Cleary MD Procedure Physician Stephie Mcqueen MD         CARDIAC CATHETERIZATION REPORT     PREOPERATIVE DIAGNOSIS:  dyspnea on exertion, CAD s/p PCI to LAD and RCA (in-stent restenosis, 10/21), abnormal cardiac stress test  POSTOPERATIVE DIAGNOSIS:  same as above. PROCEDURE PERFORMED:  left heart catheterization, left ventriculogram, selective coronary angiography      PROCEDURE:  The patient was brought to the cardiac catheterization lab in the fasting state. Informed consent was obtained. Moderate sedation was employed using a total of IV Versed 1mg and IV fentanyl 25mcg. I directly observed the patient from [phone] to 0803, for a total of 22 minutes, and an independent trained observer was present and assisted in the monitoring of the patient's level of consciousness and physiological status, watching the heart rate, blood pressure, oximetry, and rhythm, in addition to total moderation time. ACCESS/CATHETER PLACEMENT:  The groin was prepped and draped in a sterile manner, and the right groin area was anesthetized with 2% lidocaine area. The right femoral artery was accessed with a micropuncture needle, and a 6-Mosotho 11 cm sheath was placed. Left and right selective coronary angiography was performed using 6-Mosotho JL4 and JR4 catheters respectively. The left ventricular pressure was measured using a pigtail catheter and 30 degree ABBOTT ventriculogram was performed. The catheter was pulled back across the aortic valve was performed with the same catheter. At the conclusion of the study, right femoral artery angiography was performed to determine suitability for a closure; a Perclose suture was deployed, with excellent hemostasis. FINDINGS:      1.   Left heart catheterization:    Left ventricle: 140/11 mmHg  Aorta: 126/46/80  Left ventriculogram demonstrated a LV ejection fraction of 65-70% without significant mitral regurgitation; small apical aneurysm noted. Otherwise, there are no regional wall motion abnormalities. There was no aortic stenosis upon pullback of the catheter. 2.  Selective coronary angiography:      Left main artery: The left main artery is a medium caliber, bifurcating vessel without significant angiographic disease. LAD:  The left anterior descending artery is a medium size vessel that wraps around the apex and gives rise to two medium diagonal branches. The previously deployed stent in the mid LAD is widely patent with minimal neointimal hyperplasia. LCx: The left circumflex artery is a small caliber, non-dominant vessel without significant angiographic disease. RCA:  The right coronary artery is a large sized, dominant vessel that gives rise to a medium rPDA that tapers distally and a medium rPL branch. The previously deployed stents in the proximal and distal RCA are widely patent without neointimal hyperplasia. MEDICATIONS:  See nursing record. COMPLICATIONS:  No major complications were observed during this visit to the catheterization lab. IMPRESSION:    1. Left heart catheterization: LVEDP 11mmHg, no aortic stenosis. LVEF 65-70% with small apical outpouching, otherwise normal wall motion, no mitral regurgitation. 2.  Coronary angiography:  right dominant system  - LM: no significant angiographic disease  - LAD: patent mid stent  - LCx: small, non-dominant  - RCA: patent proximal and distal stents without significant neointimal hyperplasia     RECOMMENDATIONS: No angiographic indication for coronary revascularization.

## 2022-09-29 NOTE — PROGRESS NOTES
Pt received s/p LHC with Dr. Mau Mejia. Right femoral arterial access site closed with perclose. Site CDI, no bleeding or hematoma present. Dr. Mau Mejia at bedside and spoke with pt and pt's wife. Recovery/bedrest completed. Discharge instructions given to pt and pt's wife. IV discontinued, [facility]. Pt's wife driving pt home.

## 2022-10-05 ENCOUNTER — OFFICE VISIT (OUTPATIENT)
Dept: PHYSICAL THERAPY | Facility: HOSPITAL | Age: 82
End: 2022-10-05
Attending: HOSPITALIST
Payer: MEDICARE

## 2022-10-05 PROCEDURE — 97110 THERAPEUTIC EXERCISES: CPT

## 2022-10-05 PROCEDURE — 97116 GAIT TRAINING THERAPY: CPT

## 2022-10-11 ENCOUNTER — TELEPHONE (OUTPATIENT)
Dept: PHYSICAL THERAPY | Facility: HOSPITAL | Age: 82
End: 2022-10-11

## 2022-10-11 NOTE — TELEPHONE ENCOUNTER
Dr. Francis Humphrye, PCP, called inquiring what type of device for patient. Explained U-step walker, and benefit with braking system. Physician plans to order.

## 2022-10-13 PROBLEM — I62.9 INTRACRANIAL HEMORRHAGE (HCC): Status: ACTIVE | Noted: 2022-01-01

## 2022-10-13 NOTE — ED QUICK NOTES
1843: Category II Trauma and Code Stroke Called.  Patient was not called category I trauma per direction of ERMD.

## 2022-10-14 NOTE — OCCUPATIONAL THERAPY NOTE
Received order for OT evaluation. Admitted after falling on stairs. Large SDH. CT imaging this morning with worsening mass effect, per chart. Patient remains intubated. Spoke with RN. Therapy will follow the patient peripherally and evaluate the patient when he is appropriate for therapy.

## 2022-10-14 NOTE — CONSULTS
[facility]  Neurological Surgery Consult Note    Vernon Villagran  8/30/1940  [id number]  PCP: Nicholas Valiente MD    REASON FOR CONSULT:  Large hyperacute SDH    HISTORY OF PRESENT ILLNESS:  Vernon Villagran is a(n) 80year old male with Parkinson's, CAD s/p 2 stents on ASA/Plavix who was brought to the ER status post fall with head trauma and LOC. He was found unresponsive. Reportedly fell down a flight of stairs, hitting the back of his head and suddenly became unconscious. Per EMS report, patient posturing in the field and on the way to the hospital. Per ED staff, patient again noted to be posturing on arrival and to have a right blown pupil with a left unreactive pupil. CT head obtained demonstrating a very large right SDH, tSAH, and scattered petechial hemorrhages. PAST MEDICAL HISTORY:  Parkinson's, CAD s/p 2 stents on ASA/Plavix    PAST SURGICAL HISTORY:  Unable to obtain    FAMILY HISTORY:  Unable to obtain    SOCIAL HISTORY:  Unable to obtain    ALLERGIES:  Unable to obtain    MEDICATIONS:  ASA/Plavix  Family denies any other blood thinning medications    REVIEW OF SYSTEMS:  Unable to perform    PHYSICAL EXAMINATION:  GCS 4T = E1VtM2  Intubated, off sedation  Right pupil blown non-reactive, left pupil 4 mm non-reactive  Extensor posturing to painful stimulation  Strong cough/weak gag/+corneals    IMAGING:  CT HEAD: Hyperacute right convexity subdural hematoma measuring approximately 1.5 cm at greatest thickness with approximately 1.8 cm of left to right midline shift. Extensive diffuse acute subarachnoid hemorrhage throughout the sellar and perimesencephalic cisterns. Effacement of the ventricular system. ASSESSMENT:  Mr. Niyah Painter unfortunately presents with a very large subdural hematoma and with a clinical exam that bodes extremely poor prognosis.  At his age, with his comorbidities, and blood thinner use this represents a non-salvageable injury (based on the results of the CRASH trial, he would have a 97% 14 day mortality rate and 99% chance of unfavorable outcome at 6 months). I had extensive conversations with his wife (in person), and his two sons (over the phone). I explained to them the non-salvageable nature of his injury and that the focus now should really be on respecting his wishes. There is no living will, but family is all in agreement that he would not want heroic measures in the event no recovery would be possible. They all agreed they would not want to pursue any neurosurgical intervention. However, they are not quite ready to transition to comfort care as the two sons are trying to get back from different states to be with him. Plan:  Non-salvageable injury  No role for neurosurgical intervention, family in agreement  Ongoing [address] discussions    Kenroy Lovell MD  [place] [address] [address] [place] [address]  [phone]  Pager [phone]  10/13/2022 8:52 PM      This note was created using a voice-recognition transcribing system. Incorrect words or phrases may have been missed during proofreading. Please interpret accordingly.

## 2022-10-14 NOTE — SLP NOTE
Order received. Note patient orally intubated and not appropriate for assessment at this time. Will hold and continue to follow peripherally, completing evaluation as clinically appropriate.     Jenny Horner Shen 87 CCC-SLP  Pager [phone]

## 2022-10-14 NOTE — PROGRESS NOTES
Spiritual Care Visit Note    Visited With: (P) Family    Spiritual Care Taxonomy:    Intended Effects: (P) Promote a sense of peace    Methods: (P) Offer emotional support; Offer support    Interventions: Acknowledge current situation; Active listening; Ask guided questions;Assist someone with Advance Directives;Silent prayer       10/14/22 1534   Clinical Encounter Type   Visited With Family   Routine Visit Follow-up   Family Spiritual Encounters   Family Coping Sadness  (Processing)   Family Participation in Care Consistently   Family Support During Treatment Consistently   Taxonomy   Intended Effects Promote a sense of peace   Methods Offer emotional support; Offer support     Discussion:   followed-up with family to offer support. Son shared that they are still processing what happened. Family members are aware that [facility] is available and can be contacted through nursing staff. Spiritual Care support can be requested via an Epic consult. NICOLAS Yarbrough. Lauren Ritchie

## 2022-10-14 NOTE — PROGRESS NOTES
10/13/22 6670   Clinical Encounter Type   Visited With   (Patient's Wife)   Patient Spiritual Encounters   Spiritual Assessment Completed Yes   Taxonomy   Intended Effects Promote a sense of peace   Methods Offer emotional support   Interventions Acknowledge current situation; Active listening; Ask guided questions; Facilitate communication;[place];Silent prayer      was in ED on another call when a nurse asked to visit the patient's wife.  received suggestion from ED staff to use [facility] in [place] of Family room which is unavailable due to construction. Haim Salisbury stayed with patient wife while in [facility].  checked in with Doctor to make sure patient spouse received updates.  escorted all doctors to patient spouse and remained for emotional support to patient spouse as she communicated with her two sons who are both flying in(one at midnight from [place]) the [employer] leaves from [facility] at [address] escorted patient's wife to room 6504 once patient was moved. Patient spouse did not want to sit in room with spouse so  escorted to waiting room.  also spoke with patient son providing hospital phone number and  number if further assistance is needed.  provided patient with food from nursing staff but patient declined. Patient acknowledged Mandaen rossi but welcomed prayer.  remains available. Spiritual Care support can be requested via an Epic consult. NICOLAS Duncan. Div  Extension: [phone]

## 2022-10-14 NOTE — PROGRESS NOTES
10/14/22 0444   Clinical Encounter Type   Visited With Family  (wife and son at bedside.)   Patient Spiritual Encounters   Spiritual Assessment Completed Yes   Taxonomy   Intended Effects Promote a sense of peace   Methods Offer support   Interventions Acknowledge current situation; Active listening; Ask guided questions;Assist someone with Advance Directives;Silent prayer        spoke with nurse in reference to request for POA. Advised nurse will close consult for POA due to patient not decisional. Nurse advised on consult to social work. Son and patient spouse at bedside.  explain POA/POLST process and offered emotional/spiritual support. Spouse did not eat since  saw in ED lastnight.  made son aware and encouraged patient spouse of this.  remains available. Spiritual Care support can be requested via an Epic consult. Flory Hsu. Kaylin Topete. Div  Extension: [phone]

## 2022-10-14 NOTE — PLAN OF CARE
Assumed care of pt at 0730. Q1 Neurochecks with pupillometer. Right pupil is fixed and dilated. Left pupil NPI>3. pt decerebrate posturing with little stimulation. Cough and gag weak but intact. SBP goal 100-150 was maintained with PRN Hydralazine every 2-3hrs. Pt code status changed from Full Code to DNAR/Full Treatment. Family at bedside. All needs and questions addressed at this time.   Problem: Safety Risk - Non-Violent Restraints  Goal: Patient will remain free from self-harm  Description: INTERVENTIONS:  - Apply the least restrictive restraint to prevent harm  - Notify patient and family of reasons restraints applied  - Assess for any contributing factors to confusion (electrolyte disturbances, delirium, medications)  - Discontinue any unnecessary medical devices as soon as possible  - Assess the patient's physical comfort, circulation, skin condition, hydration, nutrition and elimination needs   - Reorient and redirection as needed  - Assess for the need to continue restraints  Outcome: Progressing

## 2022-10-14 NOTE — PLAN OF CARE
Admitted from ED with [address] s/p fall. Responsive only to noxious stimulation w/ posturing of both arms. R pupil non-reactive, L pupil reactive w/ NPI trend 3.5. With cough and gag reflex. Pls see neuro flow sheet for full assessment. NSR. Hyralazine to keep SBP <150. OGT inserted. Mannitol given as scheduled. Serum NA+ & osmo q 6 hours. HOB raised at 30-3-4 degrees. Admission data base done w/ spouse. Plan of care discussed & questions answered. 0630- CT Brain done this morning results worsening ICB & midline shift (neuro APN updated w/ these results).

## 2022-10-14 NOTE — PHYSICAL THERAPY NOTE
Physical Therapy    Order for PT eval received. Pt was admitted after falling on stairs. Large SDH. CT imaging this morning with worsening mass effect, per chart. Patient remains intubated. Spoke with RN. Therapy will follow the patient peripherally and evaluate the patient when he is appropriate for therapy.

## 2022-10-15 NOTE — PLAN OF CARE
Assumed care of Jhonny Zamudio at approximately 1930. Q1 neuro/ pupilometer. Decerebrate posturing with slight stimulation. SBP goal 100-150. Prn Hydralazine and labetalol were given, please refer to [facility] for details. Clear lung sounds. OG intact, connected to LCS, minimal output. Cuellar intact, please see I/O flowsheet. Q6 serum osmolality & serum sodium labs. Q6 mannitol. Son at bedside. No further events to note at this time.

## 2022-10-15 NOTE — PLAN OF CARE
Assumed care of patient at 0730. Patient intubated. Unresponsive on no sedation. Right pupil remains fixed. Left reactive. Posturing with any stimuli. [address] with Dr. Nathan Shepherd, later this afternoon family requesting to transition to comfort care. Patient compassionately weaned at 87 47 98, morphine pushes as needed. Family at bedside, emotional support provided.    Problem: Safety Risk - Non-Violent Restraints  Goal: Patient will remain free from self-harm  Description: INTERVENTIONS:  - Apply the least restrictive restraint to prevent harm  - Notify patient and family of reasons restraints applied  - Assess for any contributing factors to confusion (electrolyte disturbances, delirium, medications)  - Discontinue any unnecessary medical devices as soon as possible  - Assess the patient's physical comfort, circulation, skin condition, hydration, nutrition and elimination needs   - Reorient and redirection as needed  - Assess for the need to continue restraints  Outcome: Progressing

## 2022-10-17 ENCOUNTER — TELEPHONE (OUTPATIENT)
Dept: PHYSICAL THERAPY | Facility: HOSPITAL | Age: 82
End: 2022-10-17

## 2022-10-17 NOTE — PROGRESS NOTES
This RN assumed care of patient at 0730. Report received from [facility]. Pt receiving IV morphine every 2 hours, x1 dose given at 0900. Patient's respirations were 10 on room air with periods of apnea 15-30 seconds. Temperature 97.8 at 0912. HR 73 and BP 43/27 at 1048 moments before death. Patient  at 10:52 time of death. Patient's family members, spouse and two sons spoke with hospice RNs at bedside less than 30 min prior to death. Patient was upcoming for Q2 hour morphine and this RN assessed respirations as 7-8 with period of apnea for 30 seconds. This RN left room to grab morphine out of pyxis and upon returning to room, patient took last breath. Son and spouse were at bedside during time of death 10:52, other son of patient had left room to down to cafeteria and was coming back up at the time. Bereavement tray ordered and several boxes of tissues provided to family. Dr. Artemio Gavin notified of time of death. [facility] notified. Public safety notified.  notified. Family is discussing  home/after death information at this time. Post-mortem care will be provided. Family needs some private time with patient and other family members are being contacted at this time. Family left at [phone] to say final goodbyes. Post mortem care completed. Patient transport ordered to [facility].

## 2022-10-17 NOTE — PHYSICAL THERAPY NOTE
Following for PT eval, noted order to be cancelled at this time. Please re-consult if POC changes and pt has potential to functionally improve.

## 2022-10-17 NOTE — PLAN OF CARE
Problem: PAIN - ADULT  Goal: Verbalizes/displays adequate comfort level or patient's stated pain goal  Description: INTERVENTIONS:  - - Assess pain using appropriate pain scale  - Administer analgesics based on type and severity of pain and evaluate response  - Implement non-pharmacological measures as appropriate and evaluate response  - Consider cultural and social influences on pain and pain management  - Manage/alleviate anxiety  - Utilize distraction and/or relaxation techniques  - Monitor for opioid side effects  - Notify MD/LIP if interventions unsuccessful or patient reports new pain  - Anticipate increased pain with activity and pre-medicate as appropriate  Outcome: Not Progressing   On comfort care, family member at the bedside, pt is unresponsive. Breathing 12-14 /min. Skin is warm to touch, IV pain med every 2 hours for comfort. Hospice  to see tomorrow. Will cont POC.  0645: Feverish on and off.

## 2022-10-17 NOTE — HOSPICE RN NOTE
[facility] nursing visit for follow up on hospice consult order. Met with spouse and sons at bedside. Discussed hospice benefit, hospice philosophy, palliative care with questions and concerns addressed. Hospice informational book was provided. Family appreciative of hospice information but not ready to move forward with hospice at this time. They would like to discuss as a family and call [facility] if ready to initiate. [facility] will continue to follow as necessary and provide hospice services if family desires. Please call [facility] with any questions or concerns.     Maximiliano Cartagena RN, [facility] Liaison  [phone] after hours

## 2022-10-17 NOTE — TELEPHONE ENCOUNTER
Left VM for patient. Seeing as this was last scheduled visit and this is patient's 3rd no-show, will go ahead and DC over the phone. Left my desk number for call back. Attempted to leave on home phone as well but \"memory full\".

## (undated) NOTE — ED AVS SNAPSHOT
Casper Erickson   MRN: [id number]    Department:  [facility] Emergency Department   Date of Visit:  10/10/2017           Disclosure     Insurance plans vary and the physician(s) referred by the ER may not be covered by your plan.  Please contact you If you have been prescribed any medication(s), please fill your prescription right away and begin taking the medication(s) as directed    If the emergency physician has read X-rays, these will be re-interpreted by a radiologist.  If there is a significant

## (undated) NOTE — ED AVS SNAPSHOT
Matheus Lazaros   MRN: [id number]    Department:  [facility] Emergency Department   Date of Visit:  6/13/2019           Disclosure     Insurance plans vary and the physician(s) referred by the ER may not be covered by your plan.  Please contact your tell this physician (or your personal doctor if your instructions are to return to your personal doctor) about any new or lasting problems. The primary care or specialist physician will see patients referred from the [facility] Emergency Department.  Misael Yun

## (undated) NOTE — LETTER
[facility] [address], [address]  Consent for Procedure/Sedation    Date: 03/07/21    Time: 1800      1.  I authorize the performance upon Calyl Mcdaniel the following:cardiac catheterization, left ventricular cineangiography, neo Signature of person authorized                                     Relationship to  to consent for patient: _________________________ patient: ___________________    Witness: _______________________________ Date: _____________________    Printed: 3/7/2021

## (undated) NOTE — LETTER
October 28, 2021          [address] [facility] [address]  [facility] [address]      To Whom It May Concern:     This is to certify that Herbert Diane, YOB: 1940, was admitted to [facility] 10/26/21 with a heart condition requiring

## (undated) NOTE — ED AVS SNAPSHOT
[facility] Emergency Department in [address] [facility]    Phone:  [phone]    Fax:  [phone]   MRN: [id number]    Department:  [facility] Emergency Department in [place]   Date of Visit: self-assessment the day after your visit. You may also receive a call from our patient liason soon after your visit. Also, some patients receive a detailed feedback survey mailed to them a week after the visit.   If you receive this, we would really apprec [facility] [address] [facility] [address] [facility] [id number] [address] ([address]) [address] [facility]. ([address]) [facility]

## (undated) NOTE — ED AVS SNAPSHOT
Florin Krishnamurthy   MRN: [id number]    Department:  [address] Emergency Department in [place]   Date of Visit:  4/23/2018           Disclosure     Insurance plans vary and the physician(s) referred by the ER may not be covered by your plan.  Please contact tell this physician (or your personal doctor if your instructions are to return to your personal doctor) about any new or lasting problems. The primary care or specialist physician will see patients referred from the [facility] Emergency Department.  Marleen Wan

## (undated) NOTE — ED AVS SNAPSHOT
Terrie Rajan   MRN: [id number]    Department:  [facility] in [place]   Date of Visit:  9/12/2019           Disclosure     Insurance plans vary and the physician(s) referred by the ER may not be covered by your plan.  Please contact tell this physician (or your personal doctor if your instructions are to return to your personal doctor) about any new or lasting problems. The primary care or specialist physician will see patients referred from the [facility] Emergency Department.  Malcom Singh

## (undated) NOTE — ED AVS SNAPSHOT
THE [facility] Emergency Department in [address] [facility]    Phone:  [phone]    Fax:  [phone] [facility]   MRN: [id number]    Department:  [facility] Emergency Department in [place]   Date of Visit: Pediatric [phone] Emergency Department   [phone]       To Check ER Wait Times:  TEXT 'ERwait' to 10947      Click [email or web address]      Or call [phone]    If you have any problems with your follow-up, please call our case [facility] before you leave. After you leave, you should follow the attached instructions. I have read and understand the instructions given to me by my caregivers. 24-Hour Pharmacies        Pharmacy Address Phone Number   [address]  [address]. view more details from this visit by going to [email or web address]. If you've recently had a stay at the Hospital you can access your discharge instructions in [employer] by going to Visits < Admission Summaries.  If you've been to the Emergency Depar

## (undated) NOTE — ED AVS SNAPSHOT
Sheila Alford   MRN: [id number]    Department:  [facility] Emergency Department   Date of Visit:  12/19/2018           Disclosure     Insurance plans vary and the physician(s) referred by the ER may not be covered by your plan.  Please contact you tell this physician (or your personal doctor if your instructions are to return to your personal doctor) about any new or lasting problems. The primary care or specialist physician will see patients referred from the [facility] Emergency Department.  Ingris Bourgeois

## (undated) NOTE — LETTER
Patient Name: Wilner Green  YOB: 1940          MRN number:  [id number]  Date:  7/11/2019  Referring Physician:  Misty Garcia             Dx: weakness, early onset Parkinson's disease         Authorized # of Visits:  No limit         Next M

## (undated) NOTE — ED AVS SNAPSHOT
THE [facility] Emergency Department in [address] [facility]    Phone:  [phone]    Fax:  [phone] [facility]   MRN: [id number]    Department:  THE [facility] Emergency Department in [place]   Date of Visit: IF THERE IS ANY CHANGE OR WORSENING OF YOUR CONDITION, CALL YOUR PRIMARY CARE PHYSICIAN AT ONCE OR RETURN IMMEDIATELY TO THE EMERGENCY DEPARTMENT.     If you have been prescribed any medication(s), please fill your prescription right away and begin taking t

## (undated) NOTE — ED AVS SNAPSHOT
Pending sale to [facility] Emergency Department in [address] [place] [address]    Phone:  [phone]    Fax:  [phone] [facility]   MRN: [id number]    Department:  Pending sale to [facility] Emergency Department in [place]   Date of Visit: IF THERE IS ANY CHANGE OR WORSENING OF YOUR CONDITION, CALL YOUR PRIMARY CARE PHYSICIAN AT ONCE OR RETURN IMMEDIATELY TO THE EMERGENCY DEPARTMENT.     If you have been prescribed any medication(s), please fill your prescription right away and begin taking t

## (undated) NOTE — ED AVS SNAPSHOT
Kavita Aly   MRN: [id number]    Department:  [facility] Emergency Department in [place]   Date of Visit:  12/20/2018           Disclosure     Insurance plans vary and the physician(s) referred by the ER may not be covered by your plan.  Please contac tell this physician (or your personal doctor if your instructions are to return to your personal doctor) about any new or lasting problems. The primary care or specialist physician will see patients referred from the [facility] Emergency Department.  Salvadore Skiff

## (undated) NOTE — LETTER
Date & Time: 9/12/2019, 11:44 AM  Patient: Dread Alford  Encounter Provider(s):    MD Daphne Oquendo [place]       To Whom It May Concern:    Dejah Pringle was seen and treated in our department on 9/12/2019.  Please allow to cancel or p

## (undated) NOTE — IP AVS SNAPSHOT
[address]            (For Outpatient Use Only) Initial Admit Date: 12/23/2018   Inpt/Obs Admit Date: Inpt: 12/24/18 / Obs: N/A   Discharge Date:    Carylon Kawasaki:  [de-identified]   MRN: [de-identified]   CSN: [id number]        [facility] Account Financial Class: Medicare Advantage    December 31, 2018

## (undated) NOTE — LETTER
[facility] [address], [address]  Consent for Procedure/Sedation    Date: 3/6/2021    Time: 0922 1.  I authorize the performance upon Samantha Orr the following:cardiac catheterization, left ventricular cineangiography, neo Signature of person authorized                                     Relationship to  to consent for patient: _________________________ patient: ___________________    Witness: _______________________________ Date: _____________________    Printed: 3/6/2021

## (undated) NOTE — ED AVS SNAPSHOT
Lucia Downs   MRN: [id number]    Department:  [facility] Emergency Department   Date of Visit:  11/14/2018           Disclosure     Insurance plans vary and the physician(s) referred by the ER may not be covered by your plan.  Please contact you tell this physician (or your personal doctor if your instructions are to return to your personal doctor) about any new or lasting problems. The primary care or specialist physician will see patients referred from the [facility] Emergency Department.  Naila Villatoro

## (undated) NOTE — IP AVS SNAPSHOT
Patient Demographics     Address  [place] [address] Phone  [phone] [facility])  [phone] (Mobile) *Preferred* E-mail Address  [email or web address]      Emergency Contact(s)     Name Relation Home Work Mobile    Monique Porras Spouse Take 1/2 tab 12.5 mg one tab twice daily   Whitney Carbajal MD         Rasagiline Mesylate 1 MG Tabs      Take 1 mg by mouth daily. tamsulosin HCl 0.4 MG Caps  Commonly known as:  FLOMAX      Take 0.4 mg by mouth daily.                 Where to Get [id number] finasteride (PROSCAR) tab 5 mg 12/30/18 2038 Given      [id number] metoprolol tartrate (LOPRESSOR) partial tablet 12.5 mg 12/30/18 1828 Given      [id number] metoprolol tartrate (LOPRESSOR) partial tablet 12.5 mg 12/31/18 0520 Given      [id number] Testing Performed By     Kayla Post Name Director Address Valid Date Range    [place] [address] [facility] [address]baz Rojo  [id number] 02/03/16 1201 - Present            Microbiology Results (All)     Procedure Urine Culture >100,000 CFU/ML Escherichia coli  ESBL Pos    Susceptibility      Escherichia coli  ESBL Pos     Not Specified    Cefazolin >=64  Resistant    Cefepime  Resistant    Ceftazidime  Resistant    Ceftriaxone >=64  Resistant    Ciprofloxacin >=4 an US of his legs on 12/20 which was negative for DVT. Of note, there is suspicion pt has prostate cancer and he was supposed to f/u with urology for possible TURP with biopsy.     Past Medical History:  Past Medical History:   Diagnosis Date   • Abdominal ALPRAZolam 0.25 MG Oral Tab Take 0.25 mg by mouth nightly as needed. Disp:  Rfl: 1   Rasagiline Mesylate 1 MG Oral Tab Take 1 mg by mouth daily. Disp:  Rfl:    Amantadine HCl 100 MG Oral Cap Take 100 mg by mouth 2 (two) times daily.  Disp:  Rfl:    dallin Musculoskeletal: Moves all extremities. Extremities: Right mild LE swelling, erythema  Integument: No rashes or lesions. Psychiatric: Appropriate mood and affect.       Diagnostic Data:      Labs:  Recent Labs   Lab  12/20/18   1704  12/23/18   2349   [facility] Consults - MD Consult Notes      Consults signed by Carlos Love MD at 12/24/2018 10:25 AM     Author:  Carlos Love MD Service:  Infectious Disease Author Type:  Physician    Filed:  12/24/2018 10:25 AM Date of Service:  12/24/2018 10:16 AM • Pneumonia, organism unspecified(486)    • Prostate asymmetry    • Sleep disturbance    • Visual impairment     reading glasses   • Wears glasses     reading glasses      Past Surgical History:   Procedure Laterality Date   • HERNIA INGUINAL REPAIR ADULT •  [START ON 12/25/2018] Vancomycin HCl (VANCOCIN) 1,000 mg in sodium chloride 0.9 % 250 mL IVPB add-vantage, 15 mg/kg (Adjusted), Intravenous, Q24H  •  metoprolol tartrate (LOPRESSOR) partial tablet 12.5 mg, 12.5 mg, Oral, 2x Daily(Beta Blocker)  •  Marco Vital signs:[JOAO.1] Temp:  [99 °F (37.2 °C)-103.6 °F (39.8 °C)] 99.5 °F (37.5 °C)  Pulse:  [] 115  Resp:  [18-24] 18  BP: (124-163)/(46-76) 132/53[JOAO.2]  HEENT: Moist mucous membranes. Extraocular muscles are intact. Neck: No lymphadenopathy. supple, 2 Bilateral edema, no cellutlis evident        MD ALEXA [facility]  [phone][SS.0]    Electronically signed by Cassidy Dorado MD on 12/24/2018 10:25 AM   Attribution Schumacher    JOAO. 1 - Cassidy Dorado MD on and he was supposed to f/u with urology for possible TURP with biopsy. Brief Synopsis: Patient is a 68-year-old man admitted with sepsis secondary to urinary tract infection.   This has been a recurrent issue given his enlarged prostate and possible pr Take 100 mg by mouth 2 (two) times daily. Refills:  0     AmLODIPine Besylate 2.5 MG Tabs  Commonly known as:  NORVASC      Take 2.5 mg by mouth daily. Refills:  0     aspirin 81 MG Tabs      Take 81 mg by mouth every other day.    Refills:  0     Azel gallops. Abdomen: Soft, nontender, nondistended. Positive bowel sounds. No rebound or guarding. Neurologic: No focal neurological deficits. Musculoskeletal: Moves all extremities. Extremities: No edema.   --------------------------------------------- • Deep vein thrombosis (HCC)     left leg a few years ago per pt report   • Depression    • Fatigue    • Frequent urination    • Frequent use of laxatives    • GENITO-URINARY DISEASE    • High cholesterol    • Hx musculoskletl dis NEC    • Muscle weakness How much help from another person does the patient currently need. ..[CY.1]   -   Moving to and from a bed to a chair (including a wheelchair)?: A Little   -   Need to walk in hospital room?: A Little   -   Climbing 3-5 steps with a railing?: A Little[CY. 2] Pt continues to progress with all functional mobility and requires RW for EC and stability. Pt able to negotiate 11 stairs c B HR CGA on RA with sats WNL. Pt will continue to benefit from ongoing IP PT to maximize functional ind.      DISCHARGE RECOMMENDAT